# Patient Record
Sex: MALE | Race: ASIAN | NOT HISPANIC OR LATINO | Employment: FULL TIME | URBAN - METROPOLITAN AREA
[De-identification: names, ages, dates, MRNs, and addresses within clinical notes are randomized per-mention and may not be internally consistent; named-entity substitution may affect disease eponyms.]

---

## 2017-09-01 ENCOUNTER — OFFICE VISIT (OUTPATIENT)
Dept: FAMILY MEDICINE | Facility: CLINIC | Age: 22
End: 2017-09-01
Payer: COMMERCIAL

## 2017-09-01 VITALS
HEART RATE: 88 BPM | OXYGEN SATURATION: 96 % | WEIGHT: 315 LBS | DIASTOLIC BLOOD PRESSURE: 100 MMHG | SYSTOLIC BLOOD PRESSURE: 148 MMHG | TEMPERATURE: 98.1 F | BODY MASS INDEX: 49.44 KG/M2

## 2017-09-01 DIAGNOSIS — I10 ESSENTIAL HYPERTENSION WITH GOAL BLOOD PRESSURE LESS THAN 140/90: Primary | ICD-10-CM

## 2017-09-01 LAB
ANION GAP SERPL CALCULATED.3IONS-SCNC: 9 MMOL/L (ref 3–14)
BUN SERPL-MCNC: 9 MG/DL (ref 7–30)
CALCIUM SERPL-MCNC: 9.2 MG/DL (ref 8.5–10.1)
CHLORIDE SERPL-SCNC: 104 MMOL/L (ref 94–109)
CO2 SERPL-SCNC: 24 MMOL/L (ref 20–32)
CREAT SERPL-MCNC: 0.88 MG/DL (ref 0.66–1.25)
CREAT UR-MCNC: 190 MG/DL
GFR SERPL CREATININE-BSD FRML MDRD: >90 ML/MIN/1.7M2
GLUCOSE SERPL-MCNC: 109 MG/DL (ref 70–99)
MICROALBUMIN UR-MCNC: 20 MG/L
MICROALBUMIN/CREAT UR: 10.47 MG/G CR (ref 0–17)
POTASSIUM SERPL-SCNC: 4.1 MMOL/L (ref 3.4–5.3)
SODIUM SERPL-SCNC: 137 MMOL/L (ref 133–144)

## 2017-09-01 PROCEDURE — 36415 COLL VENOUS BLD VENIPUNCTURE: CPT | Performed by: FAMILY MEDICINE

## 2017-09-01 PROCEDURE — 82043 UR ALBUMIN QUANTITATIVE: CPT | Performed by: FAMILY MEDICINE

## 2017-09-01 PROCEDURE — 80048 BASIC METABOLIC PNL TOTAL CA: CPT | Performed by: FAMILY MEDICINE

## 2017-09-01 PROCEDURE — 99214 OFFICE O/P EST MOD 30 MIN: CPT | Performed by: FAMILY MEDICINE

## 2017-09-01 RX ORDER — MIRTAZAPINE 7.5 MG/1
7.5 TABLET, FILM COATED ORAL
COMMUNITY
Start: 2017-10-25 | End: 2024-09-27

## 2017-09-01 RX ORDER — LISINOPRIL 10 MG/1
10 TABLET ORAL DAILY
Qty: 30 TABLET | Refills: 1 | Status: SHIPPED | OUTPATIENT
Start: 2017-09-01 | End: 2024-09-27

## 2017-09-01 NOTE — LETTER
September 5, 2017      Mario Calderon  [address] [place] [address]            Dear Mario Calderon    Your kidney and electrolyte tests were normal. Please follow up in 1 month for blood pressure visit to recheck blood pressure to make sure it is at goal with current medication. If not, we'll make adjustments.      Enclosed is a copy of the results.  It was a pleasure to see you at your last appointment.    If you have any questions or concerns, please call myself or my nurse at [phone].    Sincerely,      Maxx Rosa MD/TAYLOR Chavez MA      Results for orders placed or performed in visit on 09/01/17   Basic metabolic panel   Result Value Ref Range    Sodium 137 133 - 144 mmol/L    Potassium 4.1 3.4 - 5.3 mmol/L    Chloride 104 94 - 109 mmol/L    Carbon Dioxide 24 20 - 32 mmol/L    Anion Gap 9 3 - 14 mmol/L    Glucose 109 (H) 70 - 99 mg/dL    Urea Nitrogen 9 7 - 30 mg/dL    Creatinine 0.88 0.66 - 1.25 mg/dL    GFR Estimate >90 >60 mL/min/1.7m2    GFR Estimate If Black >90 >60 mL/min/1.7m2    Calcium 9.2 8.5 - 10.1 mg/dL   Albumin Random Urine Quantitative with Creat Ratio   Result Value Ref Range    Creatinine Urine 190 mg/dL    Albumin Urine mg/L 20 mg/L    Albumin Urine mg/g Cr 10.47 0 - 17 mg/g Cr

## 2017-09-01 NOTE — NURSING NOTE
"Chief Complaint   Patient presents with     Hypertension     Start 7 months ago       Initial BP (!) 148/100 (BP Location: Right arm, Patient Position: Chair, Cuff Size: Adult Large)  Pulse 88  Temp 98.1  F (36.7  C) (Oral)  Wt (!) 347 lb (157.4 kg)  SpO2 96%  BMI 49.44 kg/m2 Estimated body mass index is 49.44 kg/(m^2) as calculated from the following:    Height as of 10/15/15: 5' 10.25\" (1.784 m).    Weight as of this encounter: 347 lb (157.4 kg).  Medication Reconciliation: complete   Stephani Tavera MA        "

## 2017-09-01 NOTE — PROGRESS NOTES
SUBJECTIVE:   Mario Calderon is a 21 year old male who presents to clinic today for the following health issues:      Hypertension    Hypertension Follow-up      Outpatient blood pressures are not being checked.    Low Salt Diet: not monitoring salt          Problem list and histories reviewed & adjusted, as indicated.  Additional history: as documented    Patient Active Problem List   Diagnosis     ACL tear     Asthma, exercise induced     CARDIOVASCULAR SCREENING; LDL GOAL LESS THAN 160     Diagnostic skin and sensitization tests     Allergic rhinitis due to animal dander     Seasonal allergic rhinitis     Seasonal allergic conjunctivitis     Allergy to mold spores     Acute appendicitis     Essential hypertension with goal blood pressure less than 140/90     Past Surgical History:   Procedure Laterality Date     C HAND/FINGER SURGERY UNLISTED       C TOTAL KNEE ARTHROPLASTY       PE TUBES         Social History   Substance Use Topics     Smoking status: Never Smoker     Smokeless tobacco: Never Used     Alcohol use No     Family History   Problem Relation Age of Onset     Psychotic Disorder Mother      Thyroid Disease Mother      Blood Disease Mother      hep b     DIABETES Maternal Grandmother      CEREBROVASCULAR DISEASE Maternal Grandmother      Glaucoma Maternal Grandmother      Asthma Brother      Asthma Sister      Asthma Brother      Hypertension No family hx of      Macular Degeneration No family hx of      CANCER Maternal Aunt              Reviewed and updated as needed this visit by [facility]  M[facility]       Reviewed and updated as needed this visit by Provider         ROS:  Constitutional, HEENT, cardiovascular, pulmonary, GI, , musculoskeletal, neuro, skin, endocrine and psych systems are negative, except as otherwise noted.      OBJECTIVE:   BP (!) 148/100 (BP Location: Right arm, Patient Position: Chair, Cuff Size: Adult Large)  Pulse 88  Temp 98.1  F (36.7  C) (Oral)  Wt  (!) 347 lb (157.4 kg)  SpO2 96%  BMI 49.44 kg/m2  Body mass index is 49.44 kg/(m^2).  GENERAL: healthy, alert and no distress  NECK: no adenopathy, no asymmetry, masses, or scars and thyroid normal to palpation  RESP: lungs clear to auscultation - no rales, rhonchi or wheezes  CV: regular rate and rhythm, normal S1 S2, no S3 or S4, no murmur, click or rub, no peripheral edema and peripheral pulses strong  ABDOMEN: soft, nontender, no hepatosplenomegaly, no masses and bowel sounds normal  MS: no gross musculoskeletal defects noted, no edema    Diagnostic Test Results:  none     ASSESSMENT/PLAN:     1. Essential hypertension with goal blood pressure less than 140/90  Not controlled. Start lisinopril 10 mg daily. Discussed low salt diet. RTC in 1 month for bp and bmp recheck.  - lisinopril (PRINIVIL/ZESTRIL) 10 MG tablet; Take 1 tablet (10 mg) by mouth daily  Dispense: 30 tablet; Refill: 1  - Basic metabolic panel  - Albumin Random Urine Quantitative with Creat Ratio    Work on weight loss  Regular exercise  See Patient Instructions    Maxx Rosa MD, MD  [facility]

## 2017-09-01 NOTE — MR AVS SNAPSHOT
"              After Visit Summary   2017    Mario Calderon    MRN: [id number]           Patient Information     Date Of Birth          1995        Visit Information        Provider Department      2017 2:40 PM Maxx Rosa MD [facility]        Today's Diagnoses     Essential hypertension with goal blood pressure less than 140/90    -  1       Follow-ups after your visit        Who to contact     If you have questions or need follow up information about today's clinic visit or your schedule please contact [facility] directly at [phone].  Normal or non-critical lab and imaging results will be communicated to you by [employer], letter or phone within 4 business days after the clinic has received the results. If you do not hear from us within 7 days, please contact the clinic through [employer] or phone. If you have a critical or abnormal lab result, we will notify you by phone as soon as possible.  Submit refill requests through [employer] or call your pharmacy and they will forward the refill request to us. Please allow 3 business days for your refill to be completed.          Additional Information About Your Visit        MyChart Information     [employer] lets you send messages to your doctor, view your test results, renew your prescriptions, schedule appointments and more. To sign up, go to [email or web address] . Click on \"Log in\" on the left side of the screen, which will take you to the Welcome page. Then click on \"Sign up Now\" on the right side of the page.     You will be asked to enter the access code listed below, as well as some personal information. Please follow the directions to create your username and password.     Your access code is: [email or web address]  Expires: 2017  3:27 PM     Your access code will  in 90 days. If you need help or a new code, please call your [facility] or [phone].        Care EveryWhere ID     This is your Care " EveryWhere ID. This could be used by other organizations to access your [facility] medical records  [id number]        Your Vitals Were     Pulse Temperature Pulse Oximetry BMI (Body Mass Index)          88 98.1  F (36.7  C) (Oral) 96% 49.44 kg/m2         Blood Pressure from Last 3 Encounters:   09/01/17 (!) 148/100   11/20/15 (!) 160/94   10/15/15 120/75    Weight from Last 3 Encounters:   09/01/17 (!) 347 lb (157.4 kg)   11/20/15 (!) 303 lb (137.4 kg)   10/15/15 289 lb (131.1 kg)              We Performed the Following     Albumin Random Urine Quantitative with Creat Ratio     Basic metabolic panel          Today's Medication Changes          These changes are accurate as of: 9/1/17  3:27 PM.  If you have any questions, ask your nurse or doctor.               Start taking these medicines.        Dose/Directions    lisinopril 10 MG tablet   Commonly known as:  PRINIVIL/ZESTRIL   Used for:  Essential hypertension with goal blood pressure less than 140/90   Started by:  Maxx Rosa MD        Dose:  10 mg   Take 1 tablet (10 mg) by mouth daily   Quantity:  30 tablet   Refills:  1         Stop taking these medicines if you haven't already. Please contact your care team if you have questions.     citalopram 40 MG tablet   Commonly known as:  celeXA   Stopped by:  Maxx Rosa MD                Where to get your medicines      These medications were sent to [facility] - [place] - [address]  [address], [facility] [address]     Phone:  [phone]     lisinopril 10 MG tablet                Primary Care Provider Office Phone # Fax #    Maxx Rosa [phone]       [address]  Health system [address]        Equal Access to Services     [facility] JOSE L : Annamarie gudinoo Soandriy, waaxda luqadaha, qaybta kaalmada adeegyada, john castillo. So [facility] [phone].    ATENCIÓN: Si habla español, tiene a yepez disposición servicios gratuitos  de asistencia lingüística. Shi zazueta [phone].    We comply with applicable federal civil rights laws and [place] laws. We do not discriminate on the basis of race, color, national origin, age, disability sex, sexual orientation or gender identity.            Thank you!     Thank you for choosing [facility]  for your care. Our goal is always to provide you with excellent care. Hearing back from our patients is one way we can continue to improve our services. Please take a few minutes to complete the written survey that you may receive in the mail after your visit with us. Thank you!             Your Updated Medication List - Protect others around you: Learn how to safely use, store and throw away your medicines at www.disposemymeds.org.          This list is accurate as of: 9/1/17  3:27 PM.  Always use your most recent med list.                   Brand Name Dispense Instructions for use Diagnosis    fluticasone 50 MCG/ACT spray    FLONASE    1 Package    Spray 2 sprays into both nostrils daily.    Seasonal allergic rhinitis       lisinopril 10 MG tablet    PRINIVIL/ZESTRIL    30 tablet    Take 1 tablet (10 mg) by mouth daily    Essential hypertension with goal blood pressure less than 140/90       mirtazapine 7.5 MG Tabs tablet   Start taking on:  10/25/2017    REMERON     Take 7.5 mg by mouth

## 2022-05-18 ENCOUNTER — TRANSFERRED RECORDS (OUTPATIENT)
Dept: HEALTH INFORMATION MANAGEMENT | Facility: CLINIC | Age: 27
End: 2022-05-18

## 2023-03-08 ENCOUNTER — TRANSFERRED RECORDS (OUTPATIENT)
Dept: HEALTH INFORMATION MANAGEMENT | Facility: CLINIC | Age: 28
End: 2023-03-08

## 2023-07-17 ENCOUNTER — TRANSFERRED RECORDS (OUTPATIENT)
Dept: HEALTH INFORMATION MANAGEMENT | Facility: CLINIC | Age: 28
End: 2023-07-17

## 2024-09-27 ENCOUNTER — APPOINTMENT (OUTPATIENT)
Dept: MRI IMAGING | Facility: CLINIC | Age: 29
DRG: 552 | End: 2024-09-27
Attending: FAMILY MEDICINE
Payer: COMMERCIAL

## 2024-09-27 ENCOUNTER — HOSPITAL ENCOUNTER (INPATIENT)
Facility: CLINIC | Age: 29
LOS: 4 days | Discharge: HOME | DRG: 552 | End: 2024-10-01
Attending: FAMILY MEDICINE | Admitting: INTERNAL MEDICINE
Payer: COMMERCIAL

## 2024-09-27 ENCOUNTER — APPOINTMENT (OUTPATIENT)
Dept: CT IMAGING | Facility: CLINIC | Age: 29
DRG: 552 | End: 2024-09-27
Attending: FAMILY MEDICINE
Payer: COMMERCIAL

## 2024-09-27 ENCOUNTER — APPOINTMENT (OUTPATIENT)
Dept: CT IMAGING | Facility: CLINIC | Age: 29
DRG: 552 | End: 2024-09-27
Attending: NURSE PRACTITIONER
Payer: COMMERCIAL

## 2024-09-27 DIAGNOSIS — M54.41 CHRONIC MIDLINE LOW BACK PAIN WITH BILATERAL SCIATICA: ICD-10-CM

## 2024-09-27 DIAGNOSIS — R20.0 BILATERAL LEG NUMBNESS: ICD-10-CM

## 2024-09-27 DIAGNOSIS — M54.16 LUMBAR RADICULOPATHY: ICD-10-CM

## 2024-09-27 DIAGNOSIS — R33.9 URINARY RETENTION: ICD-10-CM

## 2024-09-27 DIAGNOSIS — G89.29 CHRONIC MIDLINE LOW BACK PAIN WITH BILATERAL SCIATICA: ICD-10-CM

## 2024-09-27 DIAGNOSIS — M54.42 CHRONIC MIDLINE LOW BACK PAIN WITH BILATERAL SCIATICA: ICD-10-CM

## 2024-09-27 DIAGNOSIS — Z89.511 ACQUIRED ABSENCE OF RIGHT LOWER EXTREMITY BELOW KNEE (H): Primary | ICD-10-CM

## 2024-09-27 DIAGNOSIS — R32 URINARY INCONTINENCE, UNSPECIFIED TYPE: ICD-10-CM

## 2024-09-27 LAB
ABO/RH(D): NORMAL
ALBUMIN SERPL BCG-MCNC: 4.2 G/DL (ref 3.5–5.2)
ALBUMIN UR-MCNC: NEGATIVE MG/DL
ALP SERPL-CCNC: 69 U/L (ref 40–150)
ALT SERPL W P-5'-P-CCNC: 45 U/L (ref 0–70)
ANION GAP SERPL CALCULATED.3IONS-SCNC: 12 MMOL/L (ref 7–15)
ANTIBODY SCREEN: NEGATIVE
APPEARANCE UR: CLEAR
APTT PPP: 27 SECONDS (ref 22–38)
AST SERPL W P-5'-P-CCNC: 31 U/L (ref 0–45)
BACTERIA #/AREA URNS HPF: ABNORMAL /HPF
BASOPHILS # BLD AUTO: 0.1 10E3/UL (ref 0–0.2)
BASOPHILS NFR BLD AUTO: 1 %
BILIRUB SERPL-MCNC: 0.4 MG/DL
BILIRUB UR QL STRIP: NEGATIVE
BUN SERPL-MCNC: 11.3 MG/DL (ref 6–20)
CALCIUM SERPL-MCNC: 9.3 MG/DL (ref 8.8–10.4)
CHLORIDE SERPL-SCNC: 108 MMOL/L (ref 98–107)
COLOR UR AUTO: ABNORMAL
CREAT SERPL-MCNC: 0.89 MG/DL (ref 0.67–1.17)
EGFRCR SERPLBLD CKD-EPI 2021: >90 ML/MIN/1.73M2
EOSINOPHIL # BLD AUTO: 0.2 10E3/UL (ref 0–0.7)
EOSINOPHIL NFR BLD AUTO: 2 %
ERYTHROCYTE [DISTWIDTH] IN BLOOD BY AUTOMATED COUNT: 12.7 % (ref 10–15)
GLUCOSE SERPL-MCNC: 189 MG/DL (ref 70–99)
GLUCOSE UR STRIP-MCNC: NEGATIVE MG/DL
HCO3 SERPL-SCNC: 21 MMOL/L (ref 22–29)
HCT VFR BLD AUTO: 47.2 % (ref 40–53)
HGB BLD-MCNC: 14.8 G/DL (ref 13.3–17.7)
HGB UR QL STRIP: NEGATIVE
IMM GRANULOCYTES # BLD: 0 10E3/UL
IMM GRANULOCYTES NFR BLD: 0 %
INR PPP: 1.02 (ref 0.85–1.15)
KETONES UR STRIP-MCNC: NEGATIVE MG/DL
LEUKOCYTE ESTERASE UR QL STRIP: NEGATIVE
LYMPHOCYTES # BLD AUTO: 2 10E3/UL (ref 0.8–5.3)
LYMPHOCYTES NFR BLD AUTO: 25 %
MCH RBC QN AUTO: 25.2 PG (ref 26.5–33)
MCHC RBC AUTO-ENTMCNC: 31.4 G/DL (ref 31.5–36.5)
MCV RBC AUTO: 80 FL (ref 78–100)
MONOCYTES # BLD AUTO: 0.5 10E3/UL (ref 0–1.3)
MONOCYTES NFR BLD AUTO: 7 %
MUCOUS THREADS #/AREA URNS LPF: PRESENT /LPF
NEUTROPHILS # BLD AUTO: 5.2 10E3/UL (ref 1.6–8.3)
NEUTROPHILS NFR BLD AUTO: 66 %
NITRATE UR QL: NEGATIVE
NRBC # BLD AUTO: 0 10E3/UL
NRBC BLD AUTO-RTO: 0 /100
PH UR STRIP: 5 [PH] (ref 5–7)
PLATELET # BLD AUTO: 349 10E3/UL (ref 150–450)
POTASSIUM SERPL-SCNC: 3.8 MMOL/L (ref 3.4–5.3)
PROT SERPL-MCNC: 8.2 G/DL (ref 6.4–8.3)
RBC # BLD AUTO: 5.88 10E6/UL (ref 4.4–5.9)
RBC URINE: 1 /HPF
SODIUM SERPL-SCNC: 141 MMOL/L (ref 135–145)
SP GR UR STRIP: 1.02 (ref 1–1.03)
SPECIMEN EXPIRATION DATE: NORMAL
SQUAMOUS EPITHELIAL: 1 /HPF
UROBILINOGEN UR STRIP-MCNC: NORMAL MG/DL
WBC # BLD AUTO: 7.9 10E3/UL (ref 4–11)
WBC URINE: 4 /HPF

## 2024-09-27 PROCEDURE — 85730 THROMBOPLASTIN TIME PARTIAL: CPT | Performed by: FAMILY MEDICINE

## 2024-09-27 PROCEDURE — 99207 PR APP CREDIT; MD BILLING SHARED VISIT: CPT

## 2024-09-27 PROCEDURE — 72146 MRI CHEST SPINE W/O DYE: CPT | Mod: 26 | Performed by: RADIOLOGY

## 2024-09-27 PROCEDURE — 250N000012 HC RX MED GY IP 250 OP 636 PS 637

## 2024-09-27 PROCEDURE — 84155 ASSAY OF PROTEIN SERUM: CPT | Performed by: FAMILY MEDICINE

## 2024-09-27 PROCEDURE — 250N000011 HC RX IP 250 OP 636

## 2024-09-27 PROCEDURE — 72148 MRI LUMBAR SPINE W/O DYE: CPT

## 2024-09-27 PROCEDURE — 96374 THER/PROPH/DIAG INJ IV PUSH: CPT | Performed by: FAMILY MEDICINE

## 2024-09-27 PROCEDURE — 86901 BLOOD TYPING SEROLOGIC RH(D): CPT | Performed by: FAMILY MEDICINE

## 2024-09-27 PROCEDURE — 99222 1ST HOSP IP/OBS MODERATE 55: CPT | Mod: FS | Performed by: INTERNAL MEDICINE

## 2024-09-27 PROCEDURE — 250N000013 HC RX MED GY IP 250 OP 250 PS 637

## 2024-09-27 PROCEDURE — 250N000011 HC RX IP 250 OP 636: Performed by: FAMILY MEDICINE

## 2024-09-27 PROCEDURE — 72148 MRI LUMBAR SPINE W/O DYE: CPT | Mod: 26 | Performed by: RADIOLOGY

## 2024-09-27 PROCEDURE — 81001 URINALYSIS AUTO W/SCOPE: CPT | Performed by: FAMILY MEDICINE

## 2024-09-27 PROCEDURE — 86900 BLOOD TYPING SEROLOGIC ABO: CPT | Performed by: FAMILY MEDICINE

## 2024-09-27 PROCEDURE — 120N000002 HC R&B MED SURG/OB UMMC

## 2024-09-27 PROCEDURE — 72128 CT CHEST SPINE W/O DYE: CPT | Mod: 26 | Performed by: RADIOLOGY

## 2024-09-27 PROCEDURE — 99222 1ST HOSP IP/OBS MODERATE 55: CPT | Performed by: NURSE PRACTITIONER

## 2024-09-27 PROCEDURE — 96376 TX/PRO/DX INJ SAME DRUG ADON: CPT | Performed by: FAMILY MEDICINE

## 2024-09-27 PROCEDURE — 99285 EMERGENCY DEPT VISIT HI MDM: CPT | Mod: 25 | Performed by: FAMILY MEDICINE

## 2024-09-27 PROCEDURE — 82947 ASSAY GLUCOSE BLOOD QUANT: CPT | Performed by: FAMILY MEDICINE

## 2024-09-27 PROCEDURE — 36415 COLL VENOUS BLD VENIPUNCTURE: CPT | Performed by: FAMILY MEDICINE

## 2024-09-27 PROCEDURE — 99285 EMERGENCY DEPT VISIT HI MDM: CPT | Performed by: FAMILY MEDICINE

## 2024-09-27 PROCEDURE — 72131 CT LUMBAR SPINE W/O DYE: CPT

## 2024-09-27 PROCEDURE — 250N000012 HC RX MED GY IP 250 OP 636 PS 637: Performed by: FAMILY MEDICINE

## 2024-09-27 PROCEDURE — 72128 CT CHEST SPINE W/O DYE: CPT

## 2024-09-27 PROCEDURE — 250N000013 HC RX MED GY IP 250 OP 250 PS 637: Performed by: FAMILY MEDICINE

## 2024-09-27 PROCEDURE — 72131 CT LUMBAR SPINE W/O DYE: CPT | Mod: 26 | Performed by: RADIOLOGY

## 2024-09-27 PROCEDURE — 85610 PROTHROMBIN TIME: CPT | Performed by: FAMILY MEDICINE

## 2024-09-27 PROCEDURE — 72146 MRI CHEST SPINE W/O DYE: CPT

## 2024-09-27 PROCEDURE — 85025 COMPLETE CBC W/AUTO DIFF WBC: CPT | Performed by: FAMILY MEDICINE

## 2024-09-27 RX ORDER — METHYLPREDNISOLONE 4 MG/1
4 TABLET ORAL ONCE
Status: COMPLETED | OUTPATIENT
Start: 2024-09-27 | End: 2024-09-27

## 2024-09-27 RX ORDER — HYDROCODONE BITARTRATE AND ACETAMINOPHEN 5; 325 MG/1; MG/1
1 TABLET ORAL ONCE
Status: COMPLETED | OUTPATIENT
Start: 2024-09-27 | End: 2024-09-27

## 2024-09-27 RX ORDER — GABAPENTIN 100 MG/1
200 CAPSULE ORAL 3 TIMES DAILY
Status: DISCONTINUED | OUTPATIENT
Start: 2024-09-27 | End: 2024-09-30

## 2024-09-27 RX ORDER — AMOXICILLIN 250 MG
2 CAPSULE ORAL 2 TIMES DAILY PRN
Status: DISCONTINUED | OUTPATIENT
Start: 2024-09-27 | End: 2024-10-01 | Stop reason: HOSPADM

## 2024-09-27 RX ORDER — ONDANSETRON 2 MG/ML
4 INJECTION INTRAMUSCULAR; INTRAVENOUS ONCE
Status: COMPLETED | OUTPATIENT
Start: 2024-09-27 | End: 2024-09-27

## 2024-09-27 RX ORDER — HYDROMORPHONE HYDROCHLORIDE 1 MG/ML
0.5 INJECTION, SOLUTION INTRAMUSCULAR; INTRAVENOUS; SUBCUTANEOUS ONCE
Status: COMPLETED | OUTPATIENT
Start: 2024-09-27 | End: 2024-09-27

## 2024-09-27 RX ORDER — METHYLPREDNISOLONE 4 MG/1
4 TABLET ORAL
Status: COMPLETED | OUTPATIENT
Start: 2024-09-28 | End: 2024-09-30

## 2024-09-27 RX ORDER — OXYCODONE HYDROCHLORIDE 10 MG/1
10 TABLET ORAL EVERY 4 HOURS PRN
Status: DISCONTINUED | OUTPATIENT
Start: 2024-09-27 | End: 2024-10-01 | Stop reason: HOSPADM

## 2024-09-27 RX ORDER — METHYLPREDNISOLONE 8 MG/1
8 TABLET ORAL ONCE
Status: COMPLETED | OUTPATIENT
Start: 2024-09-27 | End: 2024-09-27

## 2024-09-27 RX ORDER — METHYLPREDNISOLONE 8 MG/1
8 TABLET ORAL AT BEDTIME
Status: COMPLETED | OUTPATIENT
Start: 2024-09-27 | End: 2024-09-28

## 2024-09-27 RX ORDER — METHYLPREDNISOLONE 4 MG/1
4 TABLET ORAL
Status: DISCONTINUED | OUTPATIENT
Start: 2024-09-28 | End: 2024-10-01 | Stop reason: HOSPADM

## 2024-09-27 RX ORDER — ONDANSETRON 4 MG/1
4 TABLET, ORALLY DISINTEGRATING ORAL EVERY 6 HOURS PRN
Status: DISCONTINUED | OUTPATIENT
Start: 2024-09-27 | End: 2024-10-01 | Stop reason: HOSPADM

## 2024-09-27 RX ORDER — CALCIUM CARBONATE 500 MG/1
1000 TABLET, CHEWABLE ORAL 4 TIMES DAILY PRN
Status: DISCONTINUED | OUTPATIENT
Start: 2024-09-27 | End: 2024-10-01 | Stop reason: HOSPADM

## 2024-09-27 RX ORDER — AMOXICILLIN 250 MG
1 CAPSULE ORAL 2 TIMES DAILY PRN
Status: DISCONTINUED | OUTPATIENT
Start: 2024-09-27 | End: 2024-10-01 | Stop reason: HOSPADM

## 2024-09-27 RX ORDER — LIDOCAINE 40 MG/G
CREAM TOPICAL
Status: DISCONTINUED | OUTPATIENT
Start: 2024-09-27 | End: 2024-10-01 | Stop reason: HOSPADM

## 2024-09-27 RX ORDER — ACETAMINOPHEN 325 MG/1
975 TABLET ORAL 3 TIMES DAILY
Status: DISCONTINUED | OUTPATIENT
Start: 2024-09-28 | End: 2024-10-01 | Stop reason: HOSPADM

## 2024-09-27 RX ORDER — ONDANSETRON 2 MG/ML
4 INJECTION INTRAMUSCULAR; INTRAVENOUS EVERY 6 HOURS PRN
Status: DISCONTINUED | OUTPATIENT
Start: 2024-09-27 | End: 2024-10-01 | Stop reason: HOSPADM

## 2024-09-27 RX ORDER — METHYLPREDNISOLONE 4 MG/1
4 TABLET ORAL AT BEDTIME
Status: DISCONTINUED | OUTPATIENT
Start: 2024-09-29 | End: 2024-10-01 | Stop reason: HOSPADM

## 2024-09-27 RX ORDER — HYDRALAZINE HYDROCHLORIDE 20 MG/ML
10 INJECTION INTRAMUSCULAR; INTRAVENOUS EVERY 6 HOURS PRN
Status: DISCONTINUED | OUTPATIENT
Start: 2024-09-27 | End: 2024-10-01 | Stop reason: HOSPADM

## 2024-09-27 RX ORDER — METHYLPREDNISOLONE 4 MG/1
4 TABLET ORAL
Status: COMPLETED | OUTPATIENT
Start: 2024-09-28 | End: 2024-09-29

## 2024-09-27 RX ORDER — POLYETHYLENE GLYCOL 3350 17 G/17G
17 POWDER, FOR SOLUTION ORAL DAILY
Status: DISCONTINUED | OUTPATIENT
Start: 2024-09-28 | End: 2024-10-01 | Stop reason: HOSPADM

## 2024-09-27 RX ORDER — HYDROMORPHONE HCL IN WATER/PF 6 MG/30 ML
0.4 PATIENT CONTROLLED ANALGESIA SYRINGE INTRAVENOUS
Status: DISCONTINUED | OUTPATIENT
Start: 2024-09-27 | End: 2024-10-01 | Stop reason: HOSPADM

## 2024-09-27 RX ORDER — LIDOCAINE 40 MG/G
CREAM TOPICAL
Status: DISCONTINUED | OUTPATIENT
Start: 2024-09-27 | End: 2024-09-27

## 2024-09-27 RX ORDER — HYDROMORPHONE HYDROCHLORIDE 2 MG/1
2 TABLET ORAL ONCE
Status: COMPLETED | OUTPATIENT
Start: 2024-09-27 | End: 2024-09-27

## 2024-09-27 RX ADMIN — HYDROMORPHONE HYDROCHLORIDE 0.5 MG: 1 INJECTION, SOLUTION INTRAMUSCULAR; INTRAVENOUS; SUBCUTANEOUS at 18:57

## 2024-09-27 RX ADMIN — HYDROMORPHONE HYDROCHLORIDE 2 MG: 2 TABLET ORAL at 17:37

## 2024-09-27 RX ADMIN — METHYLPREDNISOLONE 4 MG: 4 TABLET ORAL at 20:51

## 2024-09-27 RX ADMIN — METHYLPREDNISOLONE 4 MG: 4 TABLET ORAL at 20:52

## 2024-09-27 RX ADMIN — METHYLPREDNISOLONE 8 MG: 8 TABLET ORAL at 20:52

## 2024-09-27 RX ADMIN — HYDROMORPHONE HYDROCHLORIDE 0.5 MG: 1 INJECTION, SOLUTION INTRAMUSCULAR; INTRAVENOUS; SUBCUTANEOUS at 15:20

## 2024-09-27 RX ADMIN — METHYLPREDNISOLONE 8 MG: 8 TABLET ORAL at 22:45

## 2024-09-27 RX ADMIN — GABAPENTIN 200 MG: 100 CAPSULE ORAL at 22:45

## 2024-09-27 RX ADMIN — HYDROCODONE BITARTRATE AND ACETAMINOPHEN 1 TABLET: 5; 325 TABLET ORAL at 12:07

## 2024-09-27 RX ADMIN — ONDANSETRON 4 MG: 2 INJECTION INTRAMUSCULAR; INTRAVENOUS at 22:46

## 2024-09-27 ASSESSMENT — ACTIVITIES OF DAILY LIVING (ADL)
ADLS_ACUITY_SCORE: 35
ADLS_ACUITY_SCORE: 33
ADLS_ACUITY_SCORE: 35

## 2024-09-27 ASSESSMENT — COLUMBIA-SUICIDE SEVERITY RATING SCALE - C-SSRS
6. HAVE YOU EVER DONE ANYTHING, STARTED TO DO ANYTHING, OR PREPARED TO DO ANYTHING TO END YOUR LIFE?: NO
2. HAVE YOU ACTUALLY HAD ANY THOUGHTS OF KILLING YOURSELF IN THE PAST MONTH?: NO
1. IN THE PAST MONTH, HAVE YOU WISHED YOU WERE DEAD OR WISHED YOU COULD GO TO SLEEP AND NOT WAKE UP?: NO

## 2024-09-27 NOTE — ED PROVIDER NOTES
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  Patient typically has had sciatica pain down his right leg and no problems with this.  No previous surgeries on his back noted.  Patient awoke this morning though with low back pain with bilateral leg numbness along with this urinary continence and saddle anesthesia that he describes.  Still persisting patient states he has to push hard to urinate denies fever chills dysuric symptoms as noted no back surgeries otherwise no headache no other neurochanges now presents for evaluation.    Past Medical History  Past Medical History:   Diagnosis Date    ADD (attention deficit disorder)     Allergic rhinitis due to animal dander     Allergy to mold spores     8/28/12 skin tests pos. for:  dog(+)/DM/M/T/G/W    Asthma, exercise induced     excersise induced    Diagnostic skin and sensitization tests 8/28/12 skin tests pos. for:  dog(+)/DM/M/T/G/W    Essential hypertension with goal blood pressure less than 140/90 9/1/2017    House dust mite allergy     Seasonal allergic conjunctivitis     Seasonal allergic rhinitis      Past Surgical History:   Procedure Laterality Date    PE TUBES      ZZC HAND/FINGER SURGERY UNLISTED      ZZC TOTAL KNEE ARTHROPLASTY       No current outpatient medications on file.    No Known Allergies  Family History  Family History   Problem Relation Age of Onset    Psychotic Disorder Mother     Thyroid Disease Mother     Blood Disease Mother         hep b    Diabetes Maternal Grandmother     Cerebrovascular Disease Maternal Grandmother     Glaucoma Maternal Grandmother     Asthma Brother     Asthma Sister      Asthma Brother     Hypertension No family hx of     Macular Degeneration No family hx of     Cancer Maternal Aunt      Social History   Social History     Tobacco Use    Smoking status: Never    Smokeless tobacco: Never   Substance Use Topics    Alcohol use: No    Drug use: No      A medically appropriate review of systems was performed with pertinent positives and negatives noted in the HPI, and all other systems negative.    Physical Exam   BP: (!) 144/95  Pulse: 97  Temp: 98.2  F (36.8  C)  Resp: 16  Height: 182.9 cm (6')  Weight: 149.7 kg (330 lb)  SpO2: 97 %  Physical Exam  Vitals and nursing note reviewed.   Constitutional:       General: He is in acute distress.      Appearance: He is well-developed. He is not toxic-appearing or diaphoretic.      Comments: Patient here with brother is uncomfortable but alert and orient x 3.  Describing acute new symptoms today.   HENT:      Head: Normocephalic and atraumatic.      Nose: Nose normal. No congestion.      Mouth/Throat:      Mouth: Mucous membranes are moist.      Pharynx: Oropharynx is clear.   Eyes:      General: No scleral icterus.     Extraocular Movements: Extraocular movements intact.      Conjunctiva/sclera: Conjunctivae normal.      Pupils: Pupils are equal, round, and reactive to light.   Cardiovascular:      Rate and Rhythm: Normal rate and regular rhythm.   Pulmonary:      Effort: Pulmonary effort is normal. No respiratory distress.      Breath sounds: No stridor.   Abdominal:      General: Abdomen is flat. There is distension.      Palpations: Abdomen is soft.      Tenderness: There is no abdominal tenderness. There is no guarding.   Genitourinary:     Comments: Patient reports perineal numbness  Musculoskeletal:         General: Tenderness present. No deformity. Normal range of motion.      Cervical back: Normal range of motion and neck supple. No rigidity or tenderness.      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   Skin:     General: Skin is warm and dry.      Capillary Refill: Capillary refill takes less than 2 seconds.      Findings: No rash.   Neurological:      General: No focal deficit present.      Mental Status: He is alert and oriented to person, place, and time.      Cranial Nerves: No cranial nerve deficit.      Sensory: Sensory deficit present.      Coordination: Coordination normal.      Comments: Patient describing bilateral leg numbness and also some saddle anesthesia.  No motor weakness noted low back pain noted upon palpation   Psychiatric:      Comments: Patient comfort because of pain otherwise appropriate cooperative here.           ED Course, Procedures, & Data      X-ray in epic.  Patient is seen for some alcohol use in the past before.  Patient history of PTSD also.    In the ER an IV was established labs drawn reviewed.  I counseled neurosurgery right away who did see the patient in the ER.  I had ordered an MRI of the lumbar spine along with thoracic spine I discussed with the MRI the urgency of this they stated their way behind back wanted to expedite this as soon as possible.  They were aware and will try to do what they could.  Patient initially given Norco 1 tablet for pain control here in the ER.  Labs drawn reviewed his sodium 134 potassium 4.1.  Bicarb 21 gap is 10 glucose 173 sodium 141 potassium 3.8 bicarb is 21.  White count 8.2 hemoglobin 13.9.  Postvoid residual was 2-300 cc per nursing refer to note.  Urinalysis no sign of infection.    Patient has noted seen by neurosurgery down here recommended a CT scan of the lumbar spine also.      Patient here in the ER MRI also ordered of the thoracic and lumbar spine.  Neurosurgery reviewed these but felt there need to be more axial views of the thoracic spine which were not done.  They examined the patient down here in the ER.  [place] this point there was not an emergent finding with their  examination and rectal exam etc.  They felt the patient should be admitted to medicine with Medrol Dosepak keep n.p.o. after midnight they may consider still surgery after reviewing the MRI findings with large disc bulge etc.    Here in the ER though patient was given IV Dilaudid for pain control.  I talked to medicine will plan admit to their service we started a Medrol Dosepak also per neurosurgery's recommendations.  Pain control recommended etc.  As noted patient to be admitted to medicine service.      Procedures                 Results for orders placed or performed during the hospital encounter of 09/27/24   MR Lumbar Spine w/o Contrast     Status: None    Narrative    MR LUMBAR SPINE W/O CONTRAST 9/27/2024 4:47 PM    Provided History: low back pain with bilateral leg numbness with  saddle numbness and urinary incontinence.    Comparison: CT same day    Technique: Sagittal T1-weighted, sagittal STIR, sagittal  diffusion-weighted (with ADC map), axial T1-weighted, and 3D  volumetric axial, coronal, and sagittal reconstructed T2-weighted  images of the lumbar spine were obtained without intravenous contrast.      Findings: There are 5 lumbar-type vertebrae with partial sacralization  of L5.  The tip of the conus medullaris is at L1.  Minimal stepwise  grade 1 retrolisthesis at L3-4 and L4-5, unchanged. Mild to moderate  disc height narrowing from L3-S1.  Degenerative edema in the opposing  endplates at L4-5.    On a level by level basis:    T12-L1: No spinal canal or neuroforaminal stenosis.    L1-2: No spinal canal or neuroforaminal stenosis.    L2-3: Small posterior disc osteophyte complex results in mild spinal  canal stenosis. Mild neural foraminal narrowing bilaterally.    L3-4: Large centrally extruded posterior disc osteophyte complex  resulting in severe spinal canal stenosis. Mild left neural narrowing.  No right neural foraminal stenosis. Facet hypertrophy bilaterally.    L4-5: Large centrally  extruded posterior disc osteophyte complex  resulting in moderate to severe spinal canal stenosis. Moderate right  and mild left neural foraminal narrowing. Facet hypertrophy  bilaterally.    L5-S1: Moderate posterior disc osteophyte complex contributing to  moderate spinal canal stenosis. Asymmetric left greater than right  facet hypertrophy with heterotopic bone formation resulting in severe  left neural foraminal stenosis. Mild neuroforaminal narrowing on the  right.    Paraspinous tissues are within normal limits.      Impression    Impression:   1. Redemonstration of the large extruded posterior disc osteophyte  complexes at L3-4 and L4-5 with resultant severe spinal canal  stenosis. Also moderate spinal canal stenoses at L5-S1 as a result of  a disc osteophyte complex.  2. Moderate right neural foraminal stenosis at L4-5 and severe left  neural foraminal stenosis at L5-S1.      I have personally reviewed the examination and initial interpretation  and I agree with the findings.    LANIE LEDBETTER MD         SYSTEM ID:  [id number]   Thoracic spine MRI w/o contrast     Status: None    Narrative    MR THORACIC SPINE W/O CONTRAST 9/27/2024 5:17 PM    Provided History: sudden bilateral leg numbness and saddle anesthesia  with urinary incontinence and retention    Comparison: Same day CT    Technique: Sagittal T1-weighted, sagittal T2-weighted, sagittal STIR,  and axial T2-weighted images of the thoracic spine were obtained  without the administration of intravenous contrast. The entire T2  axial sequence was not completed as patient was in too much pain to  finish the exam.    Findings:  T2 axial sequences only performed to the level of T6 due to patient  pain. The thoracic vertebral column is in normal alignment. Mild loss  of disc height at T4-5 and T11-12. Small posterior disc bulges and  protrusions from T4-5 through T8-9 along with endplate osteophytosis  with multilevel mild spinal canal narrowing. Moderate  posterior disc  osteophyte complex at T11-12 results in mild to moderate spinal canal  stenosis and mild left neuroforaminal narrowing. Posterior disc  osteophyte complex at T12-L1 results in mild spinal canal stenosis and  mild right neural foraminal narrowing. No abnormal signal in the  thoracic spinal cord, to the extent visualized.        Impression    Impression:   Multilevel posterior disc osteophyte complexes, most pronounced at  T11-12 with mild to moderate spinal canal stenosis and at T12-L1 with  mild spinal canal stenosis. No high-grade neural foraminal stenosis.    I have personally reviewed the examination and initial interpretation  and I agree with the findings.    LANIE LEDBETTER MD         SYSTEM ID:  [id number]   CT Lumbar Spine w/o Contrast     Status: None    Narrative    Thoracic and Lumbar spine CT without contrast    History: acute back pain.    Comparison: None    Technique: Axial, coronal, and sagittal multiplanar reconstructions  obtained from acquisition of thoracic and lumbar spine CT scan without  contrast.    Findings:  Thoracic spine: There is no acute fracture or subluxation. There is no  prevertebral edema. There is no high-grade spinal canal stenosis at  any level. Disc osteophyte complexes resulting in mild spinal canal  narrowing at the T7-8 and T8-9 levels. Disc osteophyte complex at the  T11-12 space results in mild-to-moderate left neural foraminal  narrowing and probable mild spinal canal narrowing. Small disc  osteophyte complex at the T12-L1 level results in mild right neural  foraminal and mild spinal canal narrowing.No soft tissue abnormality  in the visualized paraspinous tissues anteriorly.    Lumbar Spine: There is no acute fracture or subluxation. There are 4  lumbar type vertebra,, with partial sacralization of the L5 vertebral  segment.   Multilevel disc space narrowing is greatest at the L3-4, L4-5 and  L5-S1 levels. There is ankylosis of the posterior elements of L5  and  S1 on the left.    On a level by level basis, the findings are as follows:    L1-2:  There is no focal abnormality.  L2-3:  Disc osteophyte complex with mild spinal canal stenosis. Mild  right neural foraminal narrowing. No significant left neural foraminal  narrowing.  L3-4:  Large disc osteophyte complex resulting in severe spinal canal  stenosis. Mild to moderate right and mild left left neural foraminal  narrowing. Mild facet arthropathy.  L4-5:  Large disc osteophyte complex resulting in severe spinal canal  stenosis. Moderate right and mild left neural foraminal narrowing.  L5-S1:  Large disc osteophyte complex resulting in moderate severe  spinal canal stenosis. Mild right neural foraminal narrowing. Severe  left neural foraminal narrowing secondary to heterotopic bone  formation and severe facet arthrosis. Partially sacralized left L5  segment.    The visualized paraspinous tissues anteriorly are unremarkable.      Impression    Impression:   1. Large disc osteophyte complexes at the L3-4 and L4-5 levels result  in severe spinal canal stenosis. Additionally, there is moderate to  severe narrowing at the L5-S1 level secondary to a disc osteophyte  complex.  2. Multilevel neural foraminal narrowing is greatest at the left L5-S1  neural foramina where there is severe narrowing secondary to severe  facet arthropathy, heterotopic bone formation and a partially  sacralized left L5 segment.  3. Mild thoracic spondylosis, as detailed above, without high-grade  spinal canal or neural foraminal narrowing.    I have personally reviewed the examination and initial interpretation  and I agree with the findings.    REGI SEWELL MD         SYSTEM ID:  [id number]   CT Thoracic Spine w/o Contrast     Status: None    Narrative    Thoracic and Lumbar spine CT without contrast    History: acute back pain.    Comparison: None    Technique: Axial, coronal, and sagittal multiplanar reconstructions  obtained from  acquisition of thoracic and lumbar spine CT scan without  contrast.    Findings:  Thoracic spine: There is no acute fracture or subluxation. There is no  prevertebral edema. There is no high-grade spinal canal stenosis at  any level. Disc osteophyte complexes resulting in mild spinal canal  narrowing at the T7-8 and T8-9 levels. Disc osteophyte complex at the  T11-12 space results in mild-to-moderate left neural foraminal  narrowing and probable mild spinal canal narrowing. Small disc  osteophyte complex at the T12-L1 level results in mild right neural  foraminal and mild spinal canal narrowing.No soft tissue abnormality  in the visualized paraspinous tissues anteriorly.    Lumbar Spine: There is no acute fracture or subluxation. There are 4  lumbar type vertebra,, with partial sacralization of the L5 vertebral  segment.   Multilevel disc space narrowing is greatest at the L3-4, L4-5 and  L5-S1 levels. There is ankylosis of the posterior elements of L5 and  S1 on the left.    On a level by level basis, the findings are as follows:    L1-2:  There is no focal abnormality.  L2-3:  Disc osteophyte complex with mild spinal canal stenosis. Mild  right neural foraminal narrowing. No significant left neural foraminal  narrowing.  L3-4:  Large disc osteophyte complex resulting in severe spinal canal  stenosis. Mild to moderate right and mild left left neural foraminal  narrowing. Mild facet arthropathy.  L4-5:  Large disc osteophyte complex resulting in severe spinal canal  stenosis. Moderate right and mild left neural foraminal narrowing.  L5-S1:  Large disc osteophyte complex resulting in moderate severe  spinal canal stenosis. Mild right neural foraminal narrowing. Severe  left neural foraminal narrowing secondary to heterotopic bone  formation and severe facet arthrosis. Partially sacralized left L5  segment.    The visualized paraspinous tissues anteriorly are unremarkable.      Impression    Impression:   1. Large  disc osteophyte complexes at the L3-4 and L4-5 levels result  in severe spinal canal stenosis. Additionally, there is moderate to  severe narrowing at the L5-S1 level secondary to a disc osteophyte  complex.  2. Multilevel neural foraminal narrowing is greatest at the left L5-S1  neural foramina where there is severe narrowing secondary to severe  facet arthropathy, heterotopic bone formation and a partially  sacralized left L5 segment.  3. Mild thoracic spondylosis, as detailed above, without high-grade  spinal canal or neural foraminal narrowing.    I have personally reviewed the examination and initial interpretation  and I agree with the findings.    REGI SEWELL MD         SYSTEM ID:  [id number]   INR     Status: Normal   Result Value Ref Range    INR 1.02 0.85 - 1.15   Partial thromboplastin time     Status: Normal   Result Value Ref Range    aPTT 27 22 - 38 Seconds   Comprehensive metabolic panel     Status: Abnormal   Result Value Ref Range    Sodium 141 135 - 145 mmol/L    Potassium 3.8 3.4 - 5.3 mmol/L    Carbon Dioxide (CO2) 21 (L) 22 - 29 mmol/L    Anion Gap 12 7 - 15 mmol/L    Urea Nitrogen 11.3 6.0 - 20.0 mg/dL    Creatinine 0.89 0.67 - 1.17 mg/dL    GFR Estimate >90 >60 mL/min/1.73m2    Calcium 9.3 8.8 - 10.4 mg/dL    Chloride 108 (H) 98 - 107 mmol/L    Glucose 189 (H) 70 - 99 mg/dL    Alkaline Phosphatase 69 40 - 150 U/L    AST 31 0 - 45 U/L    ALT 45 0 - 70 U/L    Protein Total 8.2 6.4 - 8.3 g/dL    Albumin 4.2 3.5 - 5.2 g/dL    Bilirubin Total 0.4 <=1.2 mg/dL   UA with Microscopic reflex to Culture     Status: Abnormal    Specimen: Urine, Clean Catch   Result Value Ref Range    Color Urine Light Yellow Colorless, Straw, Light Yellow, Yellow    Appearance Urine Clear Clear    Glucose Urine Negative Negative mg/dL    Bilirubin Urine Negative Negative    Ketones Urine Negative Negative mg/dL    Specific Gravity Urine 1.017 1.003 - 1.035    Blood Urine Negative Negative    pH Urine 5.0 5.0 - 7.0     Protein Albumin Urine Negative Negative mg/dL    Urobilinogen Urine Normal Normal, 2.0 mg/dL    Nitrite Urine Negative Negative    Leukocyte Esterase Urine Negative Negative    Bacteria Urine Few (A) None Seen /HPF    Mucus Urine Present (A) None Seen /LPF    RBC Urine 1 <=2 /HPF    WBC Urine 4 <=5 /HPF    Squamous Epithelials Urine 1 <=1 /HPF    Narrative    Urine Culture not indicated   CBC with platelets and differential     Status: Abnormal   Result Value Ref Range    WBC Count 7.9 4.0 - 11.0 10e3/uL    RBC Count 5.88 4.40 - 5.90 10e6/uL    Hemoglobin 14.8 13.3 - 17.7 g/dL    Hematocrit 47.2 40.0 - 53.0 %    MCV 80 78 - 100 fL    MCH 25.2 (L) 26.5 - 33.0 pg    MCHC 31.4 (L) 31.5 - 36.5 g/dL    RDW 12.7 10.0 - 15.0 %    Platelet Count 349 150 - 450 10e3/uL    % Neutrophils 66 %    % Lymphocytes 25 %    % Monocytes 7 %    % Eosinophils 2 %    % Basophils 1 %    % Immature Granulocytes 0 %    NRBCs per 100 WBC 0 <1 /100    Absolute Neutrophils 5.2 1.6 - 8.3 10e3/uL    Absolute Lymphocytes 2.0 0.8 - 5.3 10e3/uL    Absolute Monocytes 0.5 0.0 - 1.3 10e3/uL    Absolute Eosinophils 0.2 0.0 - 0.7 10e3/uL    Absolute Basophils 0.1 0.0 - 0.2 10e3/uL    Absolute Immature Granulocytes 0.0 <=0.4 10e3/uL    Absolute NRBCs 0.0 10e3/uL   Basic metabolic panel     Status: Abnormal   Result Value Ref Range    Sodium 134 (L) 135 - 145 mmol/L    Potassium 4.1 3.4 - 5.3 mmol/L    Chloride 103 98 - 107 mmol/L    Carbon Dioxide (CO2) 21 (L) 22 - 29 mmol/L    Anion Gap 10 7 - 15 mmol/L    Urea Nitrogen 11.7 6.0 - 20.0 mg/dL    Creatinine 0.73 0.67 - 1.17 mg/dL    GFR Estimate >90 >60 mL/min/1.73m2    Calcium 9.0 8.8 - 10.4 mg/dL    Glucose 173 (H) 70 - 99 mg/dL   CBC with platelets     Status: Abnormal   Result Value Ref Range    WBC Count 8.2 4.0 - 11.0 10e3/uL    RBC Count 5.52 4.40 - 5.90 10e6/uL    Hemoglobin 13.9 13.3 - 17.7 g/dL    Hematocrit 42.9 40.0 - 53.0 %    MCV 78 78 - 100 fL    MCH 25.2 (L) 26.5 - 33.0 pg    MCHC 32.4 31.5  - 36.5 g/dL    RDW 12.5 10.0 - 15.0 %    Platelet Count 334 150 - 450 10e3/uL   Adult Type and Screen     Status: None   Result Value Ref Range    ABO/RH(D) O POS     Antibody Screen Negative Negative    SPECIMEN EXPIRATION DATE [id number]    CBC with platelets differential     Status: Abnormal    Narrative    The following orders were created for panel order CBC with platelets differential.  Procedure                               Abnormality         Status                     ---------                               -----------         ------                     CBC with platelets and d...[[id number]]  Abnormal            Final result                 Please view results for these tests on the individual orders.   ABO/Rh type and screen     Status: None    Narrative    The following orders were created for panel order ABO/Rh type and screen.  Procedure                               Abnormality         Status                     ---------                               -----------         ------                     Adult Type and Screen[[id number]]                            Final result                 Please view results for these tests on the individual orders.     Medications   methylPREDNISolone (MEDROL) tablet 8 mg (8 mg Oral $Given 9/27/24 2052)     And   methylPREDNISolone (MEDROL) tablet 4 mg (4 mg Oral $Given 9/27/24 2052)     And   methylPREDNISolone (MEDROL) tablet 4 mg (4 mg Oral $Given 9/27/24 2051)     And   methylPREDNISolone (MEDROL) tablet 8 mg (8 mg Oral $Given 9/27/24 2245)     And   methylPREDNISolone (MEDROL) tablet 4 mg (4 mg Oral $Given 9/28/24 0744)     And   methylPREDNISolone (MEDROL) tablet 4 mg (has no administration in time range)     And   methylPREDNISolone (MEDROL) tablet 4 mg (has no administration in time range)     And   methylPREDNISolone (MEDROL) tablet 4 mg (has no administration in time range)   lidocaine 1 % 0.1-1 mL (has no administration in time range)   lidocaine (LMX4)  cream (has no administration in time range)   sodium chloride (PF) 0.9% PF flush 3 mL (3 mLs Intracatheter $Given 9/28/24 0527)   sodium chloride (PF) 0.9% PF flush 3 mL (has no administration in time range)   senna-docusate (SENOKOT-S/PERICOLACE) 8.6-50 MG per tablet 1 tablet (has no administration in time range)     Or   senna-docusate (SENOKOT-S/PERICOLACE) 8.6-50 MG per tablet 2 tablet (has no administration in time range)   calcium carbonate (TUMS) chewable tablet 1,000 mg (has no administration in time range)   acetaminophen (TYLENOL) tablet 975 mg (975 mg Oral $Given 9/28/24 0743)   gabapentin (NEURONTIN) capsule 200 mg (200 mg Oral $Given 9/28/24 0743)   oxyCODONE IR (ROXICODONE) tablet 10 mg (10 mg Oral $Given 9/28/24 0743)   HYDROmorphone (DILAUDID) injection 0.4 mg (0.4 mg Intravenous $Given 9/28/24 0527)   melatonin tablet 5 mg (5 mg Oral $Given 9/28/24 0025)   polyethylene glycol (MIRALAX) Packet 17 g (17 g Oral $Given 9/28/24 0743)   ondansetron (ZOFRAN ODT) ODT tab 4 mg (has no administration in time range)     Or   ondansetron (ZOFRAN) injection 4 mg (has no administration in time range)   hydrALAZINE (APRESOLINE) injection 10 mg (has no administration in time range)   diazepam (VALIUM) tablet 5 mg (5 mg Oral $Given 9/28/24 0213)   HYDROcodone-acetaminophen (NORCO) 5-325 MG per tablet 1 tablet (1 tablet Oral $Given 9/27/24 1207)   HYDROmorphone (PF) (DILAUDID) injection 0.5 mg (0.5 mg Intravenous $Given 9/27/24 1520)   HYDROmorphone (DILAUDID) tablet 2 mg (2 mg Oral $Given 9/27/24 1737)   HYDROmorphone (PF) (DILAUDID) injection 0.5 mg (0.5 mg Intravenous $Given 9/27/24 0438)   ondansetron (ZOFRAN) injection 4 mg (4 mg Intravenous $Given 9/27/24 9833)     Labs Ordered and Resulted from Time of ED Arrival to Time of ED Departure   COMPREHENSIVE METABOLIC PANEL - Abnormal       Result Value    Sodium 141      Potassium 3.8      Carbon Dioxide (CO2) 21 (*)     Anion Gap 12      Urea Nitrogen 11.3       Creatinine 0.89      GFR Estimate >90      Calcium 9.3      Chloride 108 (*)     Glucose 189 (*)     Alkaline Phosphatase 69      AST 31      ALT 45      Protein Total 8.2      Albumin 4.2      Bilirubin Total 0.4     ROUTINE UA WITH MICROSCOPIC REFLEX TO CULTURE - Abnormal    Color Urine Light Yellow      Appearance Urine Clear      Glucose Urine Negative      Bilirubin Urine Negative      Ketones Urine Negative      Specific Gravity Urine 1.017      Blood Urine Negative      pH Urine 5.0      Protein Albumin Urine Negative      Urobilinogen Urine Normal      Nitrite Urine Negative      Leukocyte Esterase Urine Negative      Bacteria Urine Few (*)     Mucus Urine Present (*)     RBC Urine 1      WBC Urine 4      Squamous Epithelials Urine 1     CBC WITH PLATELETS AND DIFFERENTIAL - Abnormal    WBC Count 7.9      RBC Count 5.88      Hemoglobin 14.8      Hematocrit 47.2      MCV 80      MCH 25.2 (*)     MCHC 31.4 (*)     RDW 12.7      Platelet Count 349      % Neutrophils 66      % Lymphocytes 25      % Monocytes 7      % Eosinophils 2      % Basophils 1      % Immature Granulocytes 0      NRBCs per 100 WBC 0      Absolute Neutrophils 5.2      Absolute Lymphocytes 2.0      Absolute Monocytes 0.5      Absolute Eosinophils 0.2      Absolute Basophils 0.1      Absolute Immature Granulocytes 0.0      Absolute NRBCs 0.0     BASIC METABOLIC PANEL - Abnormal    Sodium 134 (*)     Potassium 4.1      Chloride 103      Carbon Dioxide (CO2) 21 (*)     Anion Gap 10      Urea Nitrogen 11.7      Creatinine 0.73      GFR Estimate >90      Calcium 9.0      Glucose 173 (*)    CBC WITH PLATELETS - Abnormal    WBC Count 8.2      RBC Count 5.52      Hemoglobin 13.9      Hematocrit 42.9      MCV 78      MCH 25.2 (*)     MCHC 32.4      RDW 12.5      Platelet Count 334     INR - Normal    INR 1.02     PARTIAL THROMBOPLASTIN TIME - Normal    aPTT 27     TYPE AND SCREEN, ADULT    ABO/RH(D) O POS      Antibody Screen Negative      SPECIMEN  EXPIRATION DATE [id number]     ABO/RH TYPE AND SCREEN     Thoracic spine MRI w/o contrast   Final Result   Impression:    Multilevel posterior disc osteophyte complexes, most pronounced at   T11-12 with mild to moderate spinal canal stenosis and at T12-L1 with   mild spinal canal stenosis. No high-grade neural foraminal stenosis.      I have personally reviewed the examination and initial interpretation   and I agree with the findings.      LANIE LEDBETTER MD            SYSTEM ID:  [id number]      MR Lumbar Spine w/o Contrast   Final Result   Impression:    1. Redemonstration of the large extruded posterior disc osteophyte   complexes at L3-4 and L4-5 with resultant severe spinal canal   stenosis. Also moderate spinal canal stenoses at L5-S1 as a result of   a disc osteophyte complex.   2. Moderate right neural foraminal stenosis at L4-5 and severe left   neural foraminal stenosis at L5-S1.         I have personally reviewed the examination and initial interpretation   and I agree with the findings.      LANIE LEDBETTER MD            SYSTEM ID:  [id number]      CT Lumbar Spine w/o Contrast   Final Result   Impression:    1. Large disc osteophyte complexes at the L3-4 and L4-5 levels result   in severe spinal canal stenosis. Additionally, there is moderate to   severe narrowing at the L5-S1 level secondary to a disc osteophyte   complex.   2. Multilevel neural foraminal narrowing is greatest at the left L5-S1   neural foramina where there is severe narrowing secondary to severe   facet arthropathy, heterotopic bone formation and a partially   sacralized left L5 segment.   3. Mild thoracic spondylosis, as detailed above, without high-grade   spinal canal or neural foraminal narrowing.      I have personally reviewed the examination and initial interpretation   and I agree with the findings.      REGI SEWELL MD            SYSTEM ID:  [id number]      CT Thoracic Spine w/o Contrast   Final Result   Impression:    1.  Large disc osteophyte complexes at the L3-4 and L4-5 levels result   in severe spinal canal stenosis. Additionally, there is moderate to   severe narrowing at the L5-S1 level secondary to a disc osteophyte   complex.   2. Multilevel neural foraminal narrowing is greatest at the left L5-S1   neural foramina where there is severe narrowing secondary to severe   facet arthropathy, heterotopic bone formation and a partially   sacralized left L5 segment.   3. Mild thoracic spondylosis, as detailed above, without high-grade   spinal canal or neural foraminal narrowing.      I have personally reviewed the examination and initial interpretation   and I agree with the findings.      REGI SEWELL MD            SYSTEM ID:  [id number]             Critical care was not performed.     Medical Decision Making  The patient's presentation was of high complexity (an acute health issue posing potential threat to life or bodily function).    The patient's evaluation involved:  review of external note(s) from 3+ sources (see separate area of note for details)  review of 3+ test result(s) ordered prior to this encounter (see separate area of note for details)  ordering and/or review of 3+ test(s) in this encounter (see separate area of note for details)  discussion of management or test interpretation with another health professional (see separate area of note for details)    The patient's management necessitated high risk (a parenteral controlled substance) and high risk (a decision regarding hospitalization).    Assessment & Plan   28-year-old male history of previous right BKA has had some chronic sciatica slight symptoms on the right leg this morning noted bilateral leg numbness saddle anesthesia urinary incontinence present to the ER for evaluation motor still intact at this point.  Low back midline tenderness noted.  Consultation with neurosurgery right away.  Ordered an MRI of the thoracic lumbar spine CT thoracic lumbar spine  also ordered per neurosurgery they reviewed these findings also reviewed the patient here in the ER for several hours which she took get the MRI they still felt there were not adequate images done as far as axial views of the thoracic MRI.  Here in the ER told they had examined the patient more felt this point there was not an emergent concern to rush patient to surgery for concern of acute cauda equina syndrome.  The meantime Medrol Dosepak was ordered pain control and admitted to medicine service.  Continue neurosurgery to evaluate keep n.p.o. after midnight.  At this point concern neurological symptoms and discretion per their specialist neurosurgery after they evaluate the patient along with MRI findings CT etc.       I have reviewed the nursing notes. I have reviewed the findings, diagnosis, plan and need for follow up with the patient.    New Prescriptions    No medications on file       Final diagnoses:   Chronic midline low back pain with bilateral sciatica   Bilateral leg numbness   Urinary retention   Urinary incontinence, unspecified type         [facility] EMERGENCY DEPARTMENT  9/27/2024      This note was created at least in part by the use of dragon voice dictation system. Inadvertent typographical errors may still exist.  Irving Julien MD.  Patient evaluated in the emergency department during the COVID-19 pandemic period. Careful attention to patients safety was addressed throughout the evaluation. Evaluation and treatment management was initiated with disposition made efficiently and appropriate as possible to minimize any risk of potential exposure to patient during this evaluation.       Irving Julien MD  09/28/24 [id number]

## 2024-09-27 NOTE — CONSULTS
"[facility]       NEUROSURGERY CONSULTATION NOTE    This consultation was requested by Dr. Irving Julien from the Emergency Medicine service.    Reason for Consultation: Concern for cauda equina     HPI: Mario Calderon is a 28 year old male with past medical history of asthma and right foot crush injury s/p Syme amputation (6/8/2016). Patient presented to [facility] emergency department with symptoms of worsening back pain, bilateral radicular pain and numbness of bilateral lower extremities, and new urinary incontinence. Neurosurgery was consulted for concern for cauda equina.     Patient reports waking up around 0800 this morning with new numbness from waist down and radicular pain in bilateral lower extremities. Patient reports when he stood up, he loss control of his bladder function. Patient reports history of right sided radicular pain from low back down back of his leg consistent with L5 distrubution. However he is now reporting pain from low back down both legs. Patient describes numbness as \"layer covering his skin.\" Patient endorses saddle anesthesia.  Rectal tone could not be examined due to location of patient in the ED. Patient was able to void after arriving to ED, however he reports that he had to strain and \"it felt different than normal.\"  Post-void bladder scan was 234 mL. No hyper-reflexia was noted, however clonus 2-3 beats was present in left foot. On exam BLEs were full strength however patient reports subjective weakness and imbalance. Sensation change consistent with T12 level. No Peña's present. Bilateral upper extremities intact and full strength. Cranial nerves were intact. Imaging is required to diagnose cauda equina, other differentials would include transverse myelitis, multiple sclerosis and conus medullaris.      Plan to obtain MRI Thoracic & Lumbar spine. Unfortunately patient's body habitus prevents him from fitting in standard MRI. " MRI was notified about urgency of MRI and they are unable to perform MRI until Cardiac MRI is available at 1500. Despite the urgency MRI supervisor informed writer that patient will be the 2nd patient scheduled for larger MRI.  CT thoracic and Lumbar spine will be obtained while waiting for MRI.        PAST MEDICAL HISTORY:   Past Medical History:   Diagnosis Date    ADD (attention deficit disorder)     Allergic rhinitis due to animal dander     Allergy to mold spores     8/28/12 skin tests pos. for:  dog(+)/DM/M/T/G/W    Asthma, exercise induced     excersise induced    Diagnostic skin and sensitization tests 8/28/12 skin tests pos. for:  dog(+)/DM/M/T/G/W    Essential hypertension with goal blood pressure less than 140/90 9/1/2017    House dust mite allergy     Seasonal allergic conjunctivitis     Seasonal allergic rhinitis        PAST SURGICAL HISTORY:   Past Surgical History:   Procedure Laterality Date    PE TUBES      ZZC HAND/FINGER SURGERY UNLISTED      ZZC TOTAL KNEE ARTHROPLASTY         FAMILY HISTORY:   Family History   Problem Relation Age of Onset    Psychotic Disorder Mother     Thyroid Disease Mother     Blood Disease Mother         hep b    Diabetes Maternal Grandmother     Cerebrovascular Disease Maternal Grandmother     Glaucoma Maternal Grandmother     Asthma Brother     Asthma Sister     Asthma Brother     Hypertension No family hx of     Macular Degeneration No family hx of     Cancer Maternal Aunt        SOCIAL HISTORY:   Social History     Tobacco Use    Smoking status: Never    Smokeless tobacco: Never   Substance Use Topics    Alcohol use: No       MEDICATIONS:  (Not in a hospital admission)      Allergies:  No Known Allergies    ROS: 10 point ROS of systems including Constitutional, Eyes, Respiratory, Cardiovascular, Gastroenterology, Genitourinary, Integumentary, Muscularskeletal, Psychiatric were all negative except for pertinent positives noted in my HPI.    Physical exam:   Blood  pressure (!) 144/95, pulse 97, temperature 98.2  F (36.8  C), temperature source Oral, resp. rate 16, height 1.829 m (6'), weight 149.7 kg (330 lb), SpO2 97%.  CV: RRR, no murmurs, rubs, or gallops  PULM: breathing comfortably on room air  ABD: soft, non-distended  NEUROLOGIC:  -- Awake; Alert; oriented x 3  -- Follows commands briskly  -- Speech fluent, spontaneous. No aphasia or dysarthria.  -- no gaze preference. No apparent hemineglect.  Cranial Nerves:  -- visual fields full to confrontation, PERRL 3-2mm bilat and brisk, extraocular movements intact  -- face symmetrical, tongue midline  -- sensory V1-V3 intact bilaterally  -- palate elevates symmetrically, uvula midline  -- hearing grossly intact bilat  -- Trapezii 5/5 strength bilat symmetric    Motor:  Normal bulk / tone; no tremor, rigidity, or bradykinesia.  No muscle wasting or fasciculations  No Pronator Drift     Delt Bi Tri Hand Flexion/  Extension Iliopsoas Quadriceps Hamstrings Tibialis Anterior Gastroc    C5 C6 C7 C8/T1 L2 L3 L4-S1 L4 S1   R 5 5 5 5 5 5 5 * *   L 5 5 5 5 5 5 5 5 5   *Right foot amputation    Sensory:  intact to LT in bilateral upper extremities. Significant decreased sensation from approximately T12 level    Reflexes: No Peña's, Clonus in left foot (2-3 beats)    Gait: Deferred       LABS:  Last Comprehensive Metabolic Panel:  Sodium   Date Value Ref Range Status   09/01/2017 137 133 - 144 mmol/L Final     Potassium   Date Value Ref Range Status   09/01/2017 4.1 3.4 - 5.3 mmol/L Final     Chloride   Date Value Ref Range Status   09/01/2017 104 94 - 109 mmol/L Final     Carbon Dioxide   Date Value Ref Range Status   09/01/2017 24 20 - 32 mmol/L Final     Anion Gap   Date Value Ref Range Status   09/01/2017 9 3 - 14 mmol/L Final     Glucose   Date Value Ref Range Status   09/01/2017 109 (H) 70 - 99 mg/dL Final     Comment:     Non Fasting     Urea Nitrogen   Date Value Ref Range Status   09/01/2017 9 7 - 30 mg/dL Final      Creatinine   Date Value Ref Range Status   09/01/2017 0.88 0.66 - 1.25 mg/dL Final     GFR Estimate   Date Value Ref Range Status   09/01/2017 >90 >60 mL/min/1.7m2 Final     Comment:     Non  GFR Calc     Calcium   Date Value Ref Range Status   09/01/2017 9.2 8.5 - 10.1 mg/dL Final     Lab Results   Component Value Date    WBC 7.9 09/27/2024    WBC 8.4 11/20/2015     Lab Results   Component Value Date    RBC 5.88 09/27/2024    RBC 6.09 11/20/2015     Lab Results   Component Value Date    HGB 14.8 09/27/2024    HGB 15.7 11/20/2015     Lab Results   Component Value Date    HCT 47.2 09/27/2024    HCT 46.7 11/20/2015     Lab Results   Component Value Date    MCV 80 09/27/2024    MCV 77 11/20/2015     Lab Results   Component Value Date    MCH 25.2 09/27/2024    MCH 25.8 11/20/2015     Lab Results   Component Value Date    MCHC 31.4 09/27/2024    MCHC 33.6 11/20/2015     Lab Results   Component Value Date    RDW 12.7 09/27/2024    RDW 14.9 11/20/2015     Lab Results   Component Value Date     09/27/2024     11/20/2015       IMAGING:  No imaging available    ASSESSMENT:  Mario Calderon is a 28 year old male with past medical history of asthma and right foot crush injury s/p Syme amputation (6/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 Patient presented to [facility] emergency department with symptoms of worsening back pain, bilateral radicular pain and numbness of bilateral lower extremities, and new urinary incontinence. Neurosurgery was consulted for concern for cauda equina. Plan to obtain MRI Thoracic & Lumbar spine. CT thoracic and Lumbar spine will be obtained while waiting for MRI.        Clinically Significant Risk Factors Present on Admission                  # Hypertension: Noted on problem list   - not taking antihypertensives   # Severe Obesity: Estimated body mass index is 44.76 kg/m  as calculated from the following:    Height as of this encounter: 1.829 m (6').    Weight as of this encounter: 149.7 kg (330  lb).       # Asthma: noted on problem list       RECOMMENDATIONS:  CT Thoracic/Lumbar spine STAT  MRI Thoracic/Lumbar spine STAT  HOB > 30 degrees  Neuro exams Q 1 hour until MRI completed    Pain control per primary team  Maintain SBP < 140  NPO   Continue PVR bladder scans  Glucose < 180   Continue glucose checks  Platelets > 100,000  INR < 1.5  Hemoglobin > 8      The patient was discussed with Dr. Burton, neurosurgery chief resident, and Dr. Dudley, neurosurgery staff, and they agree with the above.    Gen Wu, APRN, CNP  Neurosurgery  Pager [phone]

## 2024-09-27 NOTE — ED TRIAGE NOTES
Pt with a history of right BKA presents with a 1 day history of worsening low back pain and numbness along with bilateral leg pain and numbness.  Pt states he has been struggling with sciatic pain to his right side but today the pain is bilateral and he was unable to control his bladder this morning.      Triage Assessment (Adult)       Row Name 09/27/24 1149          Triage Assessment    Airway WDL WDL        Respiratory WDL    Respiratory WDL WDL        Skin Circulation/Temperature WDL    Skin Circulation/Temperature WDL WDL        Cardiac WDL    Cardiac WDL WDL        Peripheral/Neurovascular WDL    Peripheral Neurovascular WDL WDL        Cognitive/Neuro/Behavioral WDL    Cognitive/Neuro/Behavioral WDL WDL

## 2024-09-28 ENCOUNTER — APPOINTMENT (OUTPATIENT)
Dept: MRI IMAGING | Facility: CLINIC | Age: 29
DRG: 552 | End: 2024-09-28
Payer: COMMERCIAL

## 2024-09-28 LAB
ANION GAP SERPL CALCULATED.3IONS-SCNC: 10 MMOL/L (ref 7–15)
BUN SERPL-MCNC: 11.7 MG/DL (ref 6–20)
CALCIUM SERPL-MCNC: 9 MG/DL (ref 8.8–10.4)
CHLORIDE SERPL-SCNC: 103 MMOL/L (ref 98–107)
CREAT SERPL-MCNC: 0.73 MG/DL (ref 0.67–1.17)
EGFRCR SERPLBLD CKD-EPI 2021: >90 ML/MIN/1.73M2
ERYTHROCYTE [DISTWIDTH] IN BLOOD BY AUTOMATED COUNT: 12.5 % (ref 10–15)
GLUCOSE SERPL-MCNC: 173 MG/DL (ref 70–99)
HCO3 SERPL-SCNC: 21 MMOL/L (ref 22–29)
HCT VFR BLD AUTO: 42.9 % (ref 40–53)
HGB BLD-MCNC: 13.9 G/DL (ref 13.3–17.7)
MCH RBC QN AUTO: 25.2 PG (ref 26.5–33)
MCHC RBC AUTO-ENTMCNC: 32.4 G/DL (ref 31.5–36.5)
MCV RBC AUTO: 78 FL (ref 78–100)
PLATELET # BLD AUTO: 334 10E3/UL (ref 150–450)
POTASSIUM SERPL-SCNC: 4.1 MMOL/L (ref 3.4–5.3)
RBC # BLD AUTO: 5.52 10E6/UL (ref 4.4–5.9)
SODIUM SERPL-SCNC: 134 MMOL/L (ref 135–145)
WBC # BLD AUTO: 8.2 10E3/UL (ref 4–11)

## 2024-09-28 PROCEDURE — 72157 MRI CHEST SPINE W/O & W/DYE: CPT | Mod: 26 | Performed by: RADIOLOGY

## 2024-09-28 PROCEDURE — 250N000011 HC RX IP 250 OP 636: Performed by: STUDENT IN AN ORGANIZED HEALTH CARE EDUCATION/TRAINING PROGRAM

## 2024-09-28 PROCEDURE — A9585 GADOBUTROL INJECTION: HCPCS

## 2024-09-28 PROCEDURE — 36415 COLL VENOUS BLD VENIPUNCTURE: CPT

## 2024-09-28 PROCEDURE — 85027 COMPLETE CBC AUTOMATED: CPT

## 2024-09-28 PROCEDURE — 99207 PR APP CREDIT; MD BILLING SHARED VISIT: CPT

## 2024-09-28 PROCEDURE — 120N000002 HC R&B MED SURG/OB UMMC

## 2024-09-28 PROCEDURE — 255N000002 HC RX 255 OP 636

## 2024-09-28 PROCEDURE — 80048 BASIC METABOLIC PNL TOTAL CA: CPT

## 2024-09-28 PROCEDURE — 250N000013 HC RX MED GY IP 250 OP 250 PS 637

## 2024-09-28 PROCEDURE — 250N000012 HC RX MED GY IP 250 OP 636 PS 637

## 2024-09-28 PROCEDURE — 99233 SBSQ HOSP IP/OBS HIGH 50: CPT | Mod: FS | Performed by: STUDENT IN AN ORGANIZED HEALTH CARE EDUCATION/TRAINING PROGRAM

## 2024-09-28 PROCEDURE — 250N000011 HC RX IP 250 OP 636

## 2024-09-28 PROCEDURE — 72157 MRI CHEST SPINE W/O & W/DYE: CPT

## 2024-09-28 RX ORDER — IBUPROFEN 200 MG
1000 TABLET ORAL PRN
Status: ON HOLD | COMMUNITY
End: 2024-10-01

## 2024-09-28 RX ORDER — GADOBUTROL 604.72 MG/ML
0.1 INJECTION INTRAVENOUS ONCE
Status: COMPLETED | OUTPATIENT
Start: 2024-09-28 | End: 2024-09-28

## 2024-09-28 RX ORDER — DIAZEPAM 5 MG
5 TABLET ORAL EVERY 6 HOURS PRN
Status: DISCONTINUED | OUTPATIENT
Start: 2024-09-28 | End: 2024-10-01 | Stop reason: HOSPADM

## 2024-09-28 RX ADMIN — OXYCODONE HYDROCHLORIDE 10 MG: 10 TABLET ORAL at 03:25

## 2024-09-28 RX ADMIN — HYDROMORPHONE HYDROCHLORIDE 0.4 MG: 0.2 INJECTION, SOLUTION INTRAMUSCULAR; INTRAVENOUS; SUBCUTANEOUS at 00:21

## 2024-09-28 RX ADMIN — HYDROMORPHONE HYDROCHLORIDE 0.4 MG: 0.2 INJECTION, SOLUTION INTRAMUSCULAR; INTRAVENOUS; SUBCUTANEOUS at 05:27

## 2024-09-28 RX ADMIN — METHYLPREDNISOLONE 4 MG: 4 TABLET ORAL at 13:14

## 2024-09-28 RX ADMIN — OXYCODONE HYDROCHLORIDE 10 MG: 10 TABLET ORAL at 07:43

## 2024-09-28 RX ADMIN — ACETAMINOPHEN 975 MG: 325 TABLET ORAL at 13:14

## 2024-09-28 RX ADMIN — METHYLPREDNISOLONE 4 MG: 4 TABLET ORAL at 07:44

## 2024-09-28 RX ADMIN — ACETAMINOPHEN 975 MG: 325 TABLET ORAL at 07:43

## 2024-09-28 RX ADMIN — GABAPENTIN 200 MG: 100 CAPSULE ORAL at 13:14

## 2024-09-28 RX ADMIN — GADOBUTROL 14.9 ML: 604.72 INJECTION INTRAVENOUS at 16:08

## 2024-09-28 RX ADMIN — GABAPENTIN 200 MG: 100 CAPSULE ORAL at 20:40

## 2024-09-28 RX ADMIN — ACETAMINOPHEN 975 MG: 325 TABLET ORAL at 20:40

## 2024-09-28 RX ADMIN — HYDROMORPHONE HYDROCHLORIDE 1 MG: 1 INJECTION, SOLUTION INTRAMUSCULAR; INTRAVENOUS; SUBCUTANEOUS at 15:42

## 2024-09-28 RX ADMIN — HYDROMORPHONE HYDROCHLORIDE 0.4 MG: 0.2 INJECTION, SOLUTION INTRAMUSCULAR; INTRAVENOUS; SUBCUTANEOUS at 17:10

## 2024-09-28 RX ADMIN — GABAPENTIN 200 MG: 100 CAPSULE ORAL at 07:43

## 2024-09-28 RX ADMIN — HYDROMORPHONE HYDROCHLORIDE 0.4 MG: 0.2 INJECTION, SOLUTION INTRAMUSCULAR; INTRAVENOUS; SUBCUTANEOUS at 22:40

## 2024-09-28 RX ADMIN — POLYETHYLENE GLYCOL 3350 17 G: 17 POWDER, FOR SOLUTION ORAL at 07:43

## 2024-09-28 RX ADMIN — Medication 5 MG: at 00:25

## 2024-09-28 RX ADMIN — METHYLPREDNISOLONE 8 MG: 8 TABLET ORAL at 22:40

## 2024-09-28 RX ADMIN — DIAZEPAM 5 MG: 5 TABLET ORAL at 02:13

## 2024-09-28 RX ADMIN — METHYLPREDNISOLONE 4 MG: 4 TABLET ORAL at 18:50

## 2024-09-28 ASSESSMENT — ACTIVITIES OF DAILY LIVING (ADL)
ADLS_ACUITY_SCORE: 35
DEPENDENT_IADLS:: INDEPENDENT

## 2024-09-28 NOTE — PLAN OF CARE
Goal Outcome Evaluation:  A&Ox4. AVSS. MRI thoracic/spine is  done this afternoon.Pt reported severe bilateral buttock pain after patient lies in MRI for 40 minutes or so on his back/buttock; some relief with dilaudid 0.4 mg IV.Pt needs to be NPO after MN for tomorrow possible neurosurgery.  Pt goes outside for brief via a wheelchair. Pt with R leg below knee amputation, pt uses prosthetic leg.  Continue with POC

## 2024-09-28 NOTE — PROGRESS NOTES
[facility]    Medicine Progress Note - Hospitalist Service, GOLD TEAM 11    Date of Admission:  9/27/2024    Assessment & Plan   Mario Calderon is a 27 yo M with pmhx of R sided sciatica, s/p R foot amputation 2016, HTN, ADHD admitted on 9/27/2024 due to c/f cauda equina vs other acute neurologic disorder as pt woke up on 9/27/24 w/ sudden onset of BL LE numbness and radicular pain, loss of bladder control, saddle anesthesia. He has been followed closely by NSGY and has undergone MRIs of his spine, remains admitted for close monitoring and continued NSGY assistance.      Severe spinal and foraminal stenosis, c/f cauda equina vs other acute neurologic disorder  BL LE paresthesias, subjective weakness, radicular pain  Saddle anesthesia  Bladder incontinence  C/f urinary retention  H/o R sided sciatica  P/w sudden onset of BL LE numbness/ radicular pain/ subjective weakness, saddle anesthesia, loss of bladder control on morning of 9/27. Does have h/o R sided sciatica w/ pain down his R leg, but has never had BL symptoms and has never had this severity. Saw a chiropractor on 9/25 for sciatic pain-- no pain immediately after the appointment, but now w/ this presentation. After loss of bladder control in AM, started to note some retention, though still able to void w/ straining. Seen by NSGY in the ED who requested stat MRIs. Unfortunately this was delayed 2/2 body habitus. Lumbar MRI demonstrated severe canal stenosis at L3-4/L4-5 w/ moderate stenosis at L5-S1; moderate R neural foraminal stenosis at L4-5 and severe L neural foraminal stenosis at L5-S1. Thoracic MRI w/ mild to moderate spinal canal stenosis at T11-L1, no high grade foraminal stenosis though this was unable to be completed as pt was in too much pain to finish exam. Presentation is suspicious for cauda equina, though maintaining large ddx while awaiting further diagnostic recommendations from NSGY.  Discussed with NSGY this am who is requesting repeat of thoracic MRI given inability to complete and will see pt and provider further recommendations based on this.   - NSGY consulted and following, appreciate recommendations  - Platelets > 100,000, INR < 1.5, Hemoglobin > 8  - Maintain SBP < 140  - Stat thoracic MRI w/ contrast ordered   - Pt NPO since midnight, NSGY says cont this pending thoracic PRN   - Pain control: Scheduled tylenol, scheduled gabapentin 200mg TID, oxycodone 10mg Q4H PRN, dilaudid 0.4mg Q3H PRN              -- Adjust as needed  - Medrol dose mary  - Q4h neuro checks   - PRN bladder scan and straight cath  - PRN hydralazine for SBP > 140 x2    Hyponatremia - mild: Na 134, likely given poor PO intake as pt NPO. Cont to trend, if remains NPO for long duration consider IVF.  H/o R foot amputation: Has prosthetic.   HTN: Not noted to be on PTA meds on admission. PRN hydralazine as above.           Diet: NPO per Anesthesia Guidelines for Procedure/Surgery Except for: Meds    DVT Prophylaxis: Pneumatic Compression Devices  [place]ios Catheter: Not present  Lines: None     Cardiac Monitoring: None  Code Status: Full Code      Clinically Significant Risk Factors Present on Admission         # Hyponatremia: Lowest Na = 134 mmol/L in last 2 days, will monitor as appropriate          # Hypertension: Noted on problem list         # Severe Obesity: Estimated body mass index is 44.76 kg/m  as calculated from the following:    Height as of this encounter: 1.829 m (6').    Weight as of this encounter: 149.7 kg (330 lb).       # Asthma: noted on problem list        Disposition Plan     Medically Ready for Discharge: Anticipated in 2-4 Days       The patient's care was discussed with the Attending Physician, Dr. Morley, Patient, and NSGY Consultant(s).    Opal Buchanan PA-C  Hospitalist Service, GOLD TEAM [address] [place] [address]  Securely message with [employer] (more info)  Text page  "via [facility] Paging/Directory   See signed in provider for up to date coverage information  ______________________________________________________________________    Interval History   Pt reports feeling well, still having pain in his lower back, left leg continues to feel numb though right leg has gotten better. Also feels incontinence is improving. Has no new symptoms. Discussed plan as above and pt is agreeable.    Physical Exam   Vital Signs: Temp: 97.4  F (36.3  C) Temp src: Oral BP: 125/74 Pulse: 59   Resp: 20 SpO2: 97 % O2 Device: None (Room air)    Weight: 330 lbs 0 oz  Constitutional: lying in bed, appears comfortable, no apparent distress.  HEENT: Mucous membranes moist, no scleral icterus   Respiratory: No increased work of breathing, breathing comfortably on RA   Skin: normal skin color, texture, and no jaundice  Musculoskeletal: moving all extremities voluntarily during exam, no LE edema   Neuro: awake, alert, answering all questions appropriately during exam, sensation intact to b/l LE though on LLE pt reports touch feels like \"pins and needles\" pt does have 5/5 strength to b/l LE     Medical Decision Making       40 MINUTES SPENT BY ME on the date of service doing chart review, history, exam, documentation & further activities per the note.      Data     I have personally reviewed the following data over the past 24 hrs:    8.2  \   13.9   / 334     134 (L) 103 11.7 /  173 (H)   4.1 21 (L) 0.73 \     ALT: 45 AST: 31 AP: 69 TBILI: 0.4   ALB: 4.2 TOT PROTEIN: 8.2 LIPASE: N/A     INR:  1.02 PTT:  27   D-dimer:  N/A Fibrinogen:  N/A       "

## 2024-09-28 NOTE — MEDICATION SCRIBE - ADMISSION MEDICATION HISTORY
Medication Scribe Admission Medication History    Admission medication history is complete. The information provided in this note is only as accurate as the sources available at the time of the update.    Information Source(s): Patient via in-person    Pertinent Information: Spoke with patient in person and patient stats that he only takes Ibuprofen 200 MG OTC PRN. He states that he does not take any prescribed medications at this time.     Changes made to PTA medication list:  Added: Ibuprofen 200 MG   Deleted: None  Changed: None    Allergies reviewed with patient and updates made in EHR: no    Medication History Completed By: Chante Meza 9/28/2024 11:04 AM    PTA Med List   Medication Sig Last Dose    ibuprofen (ADVIL/MOTRIN) 200 MG tablet Take 1,000 mg by mouth as needed for pain. 9/27/2024 at PM

## 2024-09-28 NOTE — H&P
[facility]    History and Physical - Hospitalist Service, GOLD TEAM        Date of Admission:  9/27/2024    Assessment & Plan      Mario Calderon is a 28 year old male w/ PMH of R sided sciatica, s/p R foot amputation 2016, HTN, ADHD admitted on 9/27/2024 due to c/f cauda equina vs other acute neurologic disorder. Woke up on 9/27/24 w/ sudden onset of BL LE numbness and radicular pain, loss of bladder control, saddle anesthesia. Seen by NSGY in the ED, not deemed to be in need of emergent surgery.     #Severe spinal and foraminal stenosis, c/f cauda equina vs other acute neurologic disorder  #BL LE paresthesias, subjective weakness, radicular pain  #Saddle anesthesia  #Bladder incontinence  #C/f urinary retention  #H/o R sided sciatica  Presenting with sudden onset of BL LE numbness/ radicular pain/ subjective weakness, saddle anesthesia, loss of bladder control on morning of 9/27. Does have h/o R sided sciatica w/ pain down his R leg, but has never had BL symptoms and has never had this severity. Saw a chiropractor on 9/25 for sciatic pain-- no pain immediately after the appointment, but now w/ this presentation. Did report increased pain prior to chiropractor appt, hence seeking care. After loss of bladder control in AM, started to note some retention, though still able to void w/ straining.   Seen by NSGY in the ED who requested stat MRIs. Unfortunately this was delayed 2/2 body habitus. Underwent CT thoracic and lumbar spine w/ eventual completion of MRI thoracic and lumbar spine. Lumbar MRI demonstrated severe canal stenosis at L3-4/L4-5 w/ moderate stenosis at L5-S1; moderate R neural foraminal stenosis at L4-5 and severe L neural foraminal stenosis at L5-S1. Thoracic MRI w/ mild to moderate spinal canal stenosis at T11-L1, no high grade foraminal stenosis.   Presentation is of course suspicious for cauda equina, though maintaining open ddx while awaiting  further diagnostic recommendations from NSGY.   - NSGY consulted and following, appreciate recommendations  - Platelets > 100,000, INR < 1.5, Hemoglobin > 8  - Maintain SBP < 140  - NPO @ midnight  - Pain control: Scheduled tylenol, scheduled gabapentin 200mg TID, oxycodone 10mg Q4H PRN, dilaudid 0.4mg Q3H PRN   -- Adjust as needed  - Medrol dose mary  - PRN bladder scan and straight cath  - PRN hydralazine for SBP > 140 x2    #H/o R foot amputation: Has prosthetic.     #HTN: Not noted to be on PTA meds on admission. PRN hydralazine as above.             Diet: NPO per Anesthesia Guidelines for Procedure/Surgery Except for: Meds  NPO per Anesthesia Guidelines for Procedure/Surgery Except for: Meds  Combination Diet Regular Diet Adult    DVT Prophylaxis: Pneumatic Compression Devices  Palacios Catheter: Not present  Lines: None     Cardiac Monitoring: None  Code Status: Full Code    Clinically Significant Risk Factors Present on Admission                  # Hypertension: Noted on problem list         # Severe Obesity: Estimated body mass index is 44.76 kg/m  as calculated from the following:    Height as of this encounter: 1.829 m (6').    Weight as of this encounter: 149.7 kg (330 lb).       # Asthma: noted on problem list        Disposition Plan     Medically Ready for Discharge: Anticipated in 2-4 Days         The patient's care was discussed with the Attending Physician, Dr. Altamirano and Patient.    Brian Nguyen PA-C  Hospitalist Service, [facility]  Securely message with [employer] (more info)  Text page via [employer] Paging/Directory   See signed in provider for up to date coverage information    ______________________________________________________________________    Chief Complaint   BL LE numbness/ radicular pain/ subjective weakness, saddle anesthesia, loss of bladder control     History is obtained from the patient and electronic health record    History of  Present Illness   Mario Calderon is a 28 year old male who has PMH of R sided sciatica, s/p R foot amputation 2016, HTN, ADHD.  Presenting with sudden onset of BL LE numbness/ radicular pain/ subjective weakness, saddle anesthesia, loss of bladder control on morning of 9/27. Does have h/o R sided sciatica w/ pain down his R leg, but has never had BL symptoms and has never had this severity. Saw a chiropractor on 9/25 for sciatic pain-- no pain immediately after the appointment, but now w/ this presentation. Did report increased pain prior to chiropractor appt, hence seeking care. After loss of bladder control in AM, started to note some retention, though still able to void w/ straining.         Past Medical History    Past Medical History:   Diagnosis Date    ADD (attention deficit disorder)     Allergic rhinitis due to animal dander     Allergy to mold spores     8/28/12 skin tests pos. for:  dog(+)/DM/M/T/G/W    Asthma, exercise induced     excersise induced    Diagnostic skin and sensitization tests 8/28/12 skin tests pos. for:  dog(+)/DM/M/T/G/W    Essential hypertension with goal blood pressure less than 140/90 9/1/2017    House dust mite allergy     Seasonal allergic conjunctivitis     Seasonal allergic rhinitis        Past Surgical History   Past Surgical History:   Procedure Laterality Date    PE TUBES      ZZC HAND/FINGER SURGERY UNLISTED      ZZC TOTAL KNEE ARTHROPLASTY         Prior to Admission Medications   None        Review of Systems    The 10 point Review of Systems is negative other than noted in the HPI or here.     Allergies   No Known Allergies  ------------------------------------------------------------------------     Physical Exam   Vital Signs: Temp: 98.2  F (36.8  C) Temp src: Oral BP: (!) 144/95 Pulse: 97   Resp: 16 SpO2: 97 % O2 Device: None (Room air)    Weight: 330 lbs 0 oz    General: Reclining in chair, does appear uncomfortable.   Respiratory: Breathing comfortably on room  air.   MSK: Moving BL UE w/o issue, readjusting self with e/o pain, but does not appear unable to readjust.   Neuro: Moving BL LE, though exam not performed 2/2 pain. Defer to neurosurgery exam.    Skin: Warm and dry.   Psych: Pleasant and cooperative. Alert and oriented.       Medical Decision Making       75 MINUTES SPENT BY ME on the date of service doing chart review, history, exam, documentation & further activities per the note.      Data   ------------------------- PAST 24 HR DATA REVIEWED -----------------------------------------------    I have personally reviewed the following data over the past 24 hrs:    7.9  \   14.8   / 349     141 108 (H) 11.3 /  189 (H)   3.8 21 (L) 0.89 \     ALT: 45 AST: 31 AP: 69 TBILI: 0.4   ALB: 4.2 TOT PROTEIN: 8.2 LIPASE: N/A     INR:  1.02 PTT:  27   D-dimer:  N/A Fibrinogen:  N/A       Imaging results reviewed over the past 24 hrs:   Recent Results (from the past 24 hour(s))   CT Thoracic Spine w/o Contrast    Narrative    Thoracic and Lumbar spine CT without contrast    History: acute back pain.    Comparison: None    Technique: Axial, coronal, and sagittal multiplanar reconstructions  obtained from acquisition of thoracic and lumbar spine CT scan without  contrast.    Findings:  Thoracic spine: There is no acute fracture or subluxation. There is no  prevertebral edema. There is no high-grade spinal canal stenosis at  any level. Disc osteophyte complexes resulting in mild spinal canal  narrowing at the T7-8 and T8-9 levels. Disc osteophyte complex at the  T11-12 space results in mild-to-moderate left neural foraminal  narrowing and probable mild spinal canal narrowing. Small disc  osteophyte complex at the T12-L1 level results in mild right neural  foraminal and mild spinal canal narrowing.No soft tissue abnormality  in the visualized paraspinous tissues anteriorly.    Lumbar Spine: There is no acute fracture or subluxation. There are 4  lumbar type vertebra,, with partial  sacralization of the L5 vertebral  segment.   Multilevel disc space narrowing is greatest at the L3-4, L4-5 and  L5-S1 levels. There is ankylosis of the posterior elements of L5 and  S1 on the left.    On a level by level basis, the findings are as follows:    L1-2:  There is no focal abnormality.  L2-3:  Disc osteophyte complex with mild spinal canal stenosis. Mild  right neural foraminal narrowing. No significant left neural foraminal  narrowing.  L3-4:  Large disc osteophyte complex resulting in severe spinal canal  stenosis. Mild to moderate right and mild left left neural foraminal  narrowing. Mild facet arthropathy.  L4-5:  Large disc osteophyte complex resulting in severe spinal canal  stenosis. Moderate right and mild left neural foraminal narrowing.  L5-S1:  Large disc osteophyte complex resulting in moderate severe  spinal canal stenosis. Mild right neural foraminal narrowing. Severe  left neural foraminal narrowing secondary to heterotopic bone  formation and severe facet arthrosis. Partially sacralized left L5  segment.    The visualized paraspinous tissues anteriorly are unremarkable.      Impression    Impression:   1. Large disc osteophyte complexes at the L3-4 and L4-5 levels result  in severe spinal canal stenosis. Additionally, there is moderate to  severe narrowing at the L5-S1 level secondary to a disc osteophyte  complex.  2. Multilevel neural foraminal narrowing is greatest at the left L5-S1  neural foramina where there is severe narrowing secondary to severe  facet arthropathy, heterotopic bone formation and a partially  sacralized left L5 segment.  3. Mild thoracic spondylosis, as detailed above, without high-grade  spinal canal or neural foraminal narrowing.    I have personally reviewed the examination and initial interpretation  and I agree with the findings.    REGI SEWELL MD         SYSTEM ID:  [id number]   CT Lumbar Spine w/o Contrast    Narrative    Thoracic and Lumbar spine CT  without contrast    History: acute back pain.    Comparison: None    Technique: Axial, coronal, and sagittal multiplanar reconstructions  obtained from acquisition of thoracic and lumbar spine CT scan without  contrast.    Findings:  Thoracic spine: There is no acute fracture or subluxation. There is no  prevertebral edema. There is no high-grade spinal canal stenosis at  any level. Disc osteophyte complexes resulting in mild spinal canal  narrowing at the T7-8 and T8-9 levels. Disc osteophyte complex at the  T11-12 space results in mild-to-moderate left neural foraminal  narrowing and probable mild spinal canal narrowing. Small disc  osteophyte complex at the T12-L1 level results in mild right neural  foraminal and mild spinal canal narrowing.No soft tissue abnormality  in the visualized paraspinous tissues anteriorly.    Lumbar Spine: There is no acute fracture or subluxation. There are 4  lumbar type vertebra,, with partial sacralization of the L5 vertebral  segment.   Multilevel disc space narrowing is greatest at the L3-4, L4-5 and  L5-S1 levels. There is ankylosis of the posterior elements of L5 and  S1 on the left.    On a level by level basis, the findings are as follows:    L1-2:  There is no focal abnormality.  L2-3:  Disc osteophyte complex with mild spinal canal stenosis. Mild  right neural foraminal narrowing. No significant left neural foraminal  narrowing.  L3-4:  Large disc osteophyte complex resulting in severe spinal canal  stenosis. Mild to moderate right and mild left left neural foraminal  narrowing. Mild facet arthropathy.  L4-5:  Large disc osteophyte complex resulting in severe spinal canal  stenosis. Moderate right and mild left neural foraminal narrowing.  L5-S1:  Large disc osteophyte complex resulting in moderate severe  spinal canal stenosis. Mild right neural foraminal narrowing. Severe  left neural foraminal narrowing secondary to heterotopic bone  formation and severe facet arthrosis.  Partially sacralized left L5  segment.    The visualized paraspinous tissues anteriorly are unremarkable.      Impression    Impression:   1. Large disc osteophyte complexes at the L3-4 and L4-5 levels result  in severe spinal canal stenosis. Additionally, there is moderate to  severe narrowing at the L5-S1 level secondary to a disc osteophyte  complex.  2. Multilevel neural foraminal narrowing is greatest at the left L5-S1  neural foramina where there is severe narrowing secondary to severe  facet arthropathy, heterotopic bone formation and a partially  sacralized left L5 segment.  3. Mild thoracic spondylosis, as detailed above, without high-grade  spinal canal or neural foraminal narrowing.    I have personally reviewed the examination and initial interpretation  and I agree with the findings.    REGI SEWELL MD         SYSTEM ID:  [id number]   MR Lumbar Spine w/o Contrast    Narrative    MR LUMBAR SPINE W/O CONTRAST 9/27/2024 4:47 PM    Provided History: low back pain with bilateral leg numbness with  saddle numbness and urinary incontinence.    Comparison: CT same day    Technique: Sagittal T1-weighted, sagittal STIR, sagittal  diffusion-weighted (with ADC map), axial T1-weighted, and 3D  volumetric axial, coronal, and sagittal reconstructed T2-weighted  images of the lumbar spine were obtained without intravenous contrast.      Findings: There are 5 lumbar-type vertebrae with partial sacralization  of L5.  The tip of the conus medullaris is at L1.  Minimal stepwise  grade 1 retrolisthesis at L3-4 and L4-5, unchanged. Mild to moderate  disc height narrowing from L3-S1.  Degenerative edema in the opposing  endplates at L4-5.    On a level by level basis:    T12-L1: No spinal canal or neuroforaminal stenosis.    L1-2: No spinal canal or neuroforaminal stenosis.    L2-3: Small posterior disc osteophyte complex results in mild spinal  canal stenosis. Mild neural foraminal narrowing bilaterally.    L3-4: Large  centrally extruded posterior disc osteophyte complex  resulting in severe spinal canal stenosis. Mild left neural narrowing.  No right neural foraminal stenosis. Facet hypertrophy bilaterally.    L4-5: Large centrally extruded posterior disc osteophyte complex  resulting in moderate to severe spinal canal stenosis. Moderate right  and mild left neural foraminal narrowing. Facet hypertrophy  bilaterally.    L5-S1: Moderate posterior disc osteophyte complex contributing to  moderate spinal canal stenosis. Asymmetric left greater than right  facet hypertrophy with heterotopic bone formation resulting in severe  left neural foraminal stenosis. Mild neuroforaminal narrowing on the  right.    Paraspinous tissues are within normal limits.      Impression    Impression:   1. Redemonstration of the large extruded posterior disc osteophyte  complexes at L3-4 and L4-5 with resultant severe spinal canal  stenosis. Also moderate spinal canal stenoses at L5-S1 as a result of  a disc osteophyte complex.  2. Moderate right neural foraminal stenosis at L4-5 and severe left  neural foraminal stenosis at L5-S1.      I have personally reviewed the examination and initial interpretation  and I agree with the findings.    LANIE LEDBETTER MD         SYSTEM ID:  [id number]   Thoracic spine MRI w/o contrast    Narrative    MR THORACIC SPINE W/O CONTRAST 9/27/2024 5:17 PM    Provided History: sudden bilateral leg numbness and saddle anesthesia  with urinary incontinence and retention    Comparison: Same day CT    Technique: Sagittal T1-weighted, sagittal T2-weighted, sagittal STIR,  and axial T2-weighted images of the thoracic spine were obtained  without the administration of intravenous contrast. The entire T2  axial sequence was not completed as patient was in too much pain to  finish the exam.    Findings:  T2 axial sequences only performed to the level of T6 due to patient  pain. The thoracic vertebral column is in normal alignment. Mild  loss  of disc height at T4-5 and T11-12. Small posterior disc bulges and  protrusions from T4-5 through T8-9 along with endplate osteophytosis  with multilevel mild spinal canal narrowing. Moderate posterior disc  osteophyte complex at T11-12 results in mild to moderate spinal canal  stenosis and mild left neuroforaminal narrowing. Posterior disc  osteophyte complex at T12-L1 results in mild spinal canal stenosis and  mild right neural foraminal narrowing. No abnormal signal in the  thoracic spinal cord, to the extent visualized.        Impression    Impression:   Multilevel posterior disc osteophyte complexes, most pronounced at  T11-12 with mild to moderate spinal canal stenosis and at T12-L1 with  mild spinal canal stenosis. No high-grade neural foraminal stenosis.    I have personally reviewed the examination and initial interpretation  and I agree with the findings.    LANIE LEDBETTER MD         SYSTEM ID:  [id number]

## 2024-09-28 NOTE — PLAN OF CARE
Goal Outcome Evaluation: Progressing      Plan of Care Reviewed With: patient    Overall Patient Progress: no changeOverall Patient Progress: no change    Outcome Evaluation: Pt alert and oriented x4. R/A . VSS. Skin intact. Neuro intact- baseline. Plan for an MRI today. Checklist completed. One time order of Dilaudid 1 mg to be given prior to MRI. Pt independent with cares, and putting on her prosthesis. Denies shortness of breath, or nausea. Oxycodone 10 mg given x1. Report given to MEG Miller. No further concerns at this time. will continue to monitor closely.    Temp: 97.4  F (36.3  C) Temp src: Oral BP: 125/74 Pulse: 59   Resp: 20 SpO2: 94 % O2 Device: None (Room air)

## 2024-09-29 LAB
ANION GAP SERPL CALCULATED.3IONS-SCNC: 10 MMOL/L (ref 7–15)
BUN SERPL-MCNC: 15.6 MG/DL (ref 6–20)
CALCIUM SERPL-MCNC: 8.8 MG/DL (ref 8.8–10.4)
CHLORIDE SERPL-SCNC: 105 MMOL/L (ref 98–107)
CREAT SERPL-MCNC: 0.75 MG/DL (ref 0.67–1.17)
EGFRCR SERPLBLD CKD-EPI 2021: >90 ML/MIN/1.73M2
ERYTHROCYTE [DISTWIDTH] IN BLOOD BY AUTOMATED COUNT: 12.8 % (ref 10–15)
GLUCOSE SERPL-MCNC: 172 MG/DL (ref 70–99)
HCO3 SERPL-SCNC: 21 MMOL/L (ref 22–29)
HCT VFR BLD AUTO: 43.4 % (ref 40–53)
HGB BLD-MCNC: 14.4 G/DL (ref 13.3–17.7)
MCH RBC QN AUTO: 25.7 PG (ref 26.5–33)
MCHC RBC AUTO-ENTMCNC: 33.2 G/DL (ref 31.5–36.5)
MCV RBC AUTO: 78 FL (ref 78–100)
PLATELET # BLD AUTO: 336 10E3/UL (ref 150–450)
POTASSIUM SERPL-SCNC: 4.2 MMOL/L (ref 3.4–5.3)
RBC # BLD AUTO: 5.6 10E6/UL (ref 4.4–5.9)
SODIUM SERPL-SCNC: 136 MMOL/L (ref 135–145)
WBC # BLD AUTO: 10 10E3/UL (ref 4–11)

## 2024-09-29 PROCEDURE — 250N000011 HC RX IP 250 OP 636

## 2024-09-29 PROCEDURE — 36415 COLL VENOUS BLD VENIPUNCTURE: CPT

## 2024-09-29 PROCEDURE — 85027 COMPLETE CBC AUTOMATED: CPT

## 2024-09-29 PROCEDURE — 120N000002 HC R&B MED SURG/OB UMMC

## 2024-09-29 PROCEDURE — 80048 BASIC METABOLIC PNL TOTAL CA: CPT

## 2024-09-29 PROCEDURE — 99232 SBSQ HOSP IP/OBS MODERATE 35: CPT | Performed by: INTERNAL MEDICINE

## 2024-09-29 PROCEDURE — 250N000013 HC RX MED GY IP 250 OP 250 PS 637

## 2024-09-29 PROCEDURE — 250N000013 HC RX MED GY IP 250 OP 250 PS 637: Performed by: INTERNAL MEDICINE

## 2024-09-29 PROCEDURE — 250N000012 HC RX MED GY IP 250 OP 636 PS 637

## 2024-09-29 RX ADMIN — ACETAMINOPHEN 975 MG: 325 TABLET ORAL at 13:42

## 2024-09-29 RX ADMIN — GABAPENTIN 200 MG: 100 CAPSULE ORAL at 20:11

## 2024-09-29 RX ADMIN — METHYLPREDNISOLONE 4 MG: 4 TABLET ORAL at 08:41

## 2024-09-29 RX ADMIN — ACETAMINOPHEN 975 MG: 325 TABLET ORAL at 08:39

## 2024-09-29 RX ADMIN — HYDROMORPHONE HYDROCHLORIDE 0.4 MG: 0.2 INJECTION, SOLUTION INTRAMUSCULAR; INTRAVENOUS; SUBCUTANEOUS at 02:05

## 2024-09-29 RX ADMIN — POLYETHYLENE GLYCOL 3350 17 G: 17 POWDER, FOR SOLUTION ORAL at 08:40

## 2024-09-29 RX ADMIN — METHYLPREDNISOLONE 4 MG: 4 TABLET ORAL at 17:09

## 2024-09-29 RX ADMIN — CALCIUM CARBONATE (ANTACID) CHEW TAB 500 MG 1000 MG: 500 CHEW TAB at 02:42

## 2024-09-29 RX ADMIN — OXYCODONE HYDROCHLORIDE 10 MG: 10 TABLET ORAL at 03:11

## 2024-09-29 RX ADMIN — GABAPENTIN 200 MG: 100 CAPSULE ORAL at 08:39

## 2024-09-29 RX ADMIN — DIAZEPAM 5 MG: 5 TABLET ORAL at 03:54

## 2024-09-29 RX ADMIN — OXYCODONE HYDROCHLORIDE 10 MG: 10 TABLET ORAL at 13:41

## 2024-09-29 RX ADMIN — METHYLPREDNISOLONE 4 MG: 4 TABLET ORAL at 13:42

## 2024-09-29 RX ADMIN — ACETAMINOPHEN 975 MG: 325 TABLET ORAL at 20:10

## 2024-09-29 RX ADMIN — GABAPENTIN 200 MG: 100 CAPSULE ORAL at 13:42

## 2024-09-29 RX ADMIN — HYDROMORPHONE HYDROCHLORIDE 0.4 MG: 0.2 INJECTION, SOLUTION INTRAMUSCULAR; INTRAVENOUS; SUBCUTANEOUS at 18:53

## 2024-09-29 RX ADMIN — METHYLPREDNISOLONE 4 MG: 4 TABLET ORAL at 21:47

## 2024-09-29 RX ADMIN — HYDROMORPHONE HYDROCHLORIDE 2 MG: 2 TABLET ORAL at 08:39

## 2024-09-29 ASSESSMENT — ACTIVITIES OF DAILY LIVING (ADL)
ADLS_ACUITY_SCORE: 37
ADLS_ACUITY_SCORE: 37
ADLS_ACUITY_SCORE: 35
ADLS_ACUITY_SCORE: 37
ADLS_ACUITY_SCORE: 35
ADLS_ACUITY_SCORE: 37
ADLS_ACUITY_SCORE: 35
ADLS_ACUITY_SCORE: 37
ADLS_ACUITY_SCORE: 37
ADLS_ACUITY_SCORE: 35
ADLS_ACUITY_SCORE: 35

## 2024-09-29 NOTE — PROGRESS NOTES
Temp: 97.9  F (36.6  C) Temp src: Oral BP: (!) 138/90 Pulse: 85   Resp: 18 SpO2: 99 % O2 Device: None (Room air)      A/Ox4. Neuro checks Q4.  Cardiac: WDL  Stating 99% RA  GI/: voiding spontaneously but states there is a bit of strain (he is unsure if it is related to his numbness). NO BM this shift, pt is unsure if lack of BM is related to numbness or other reasons. Suggested some PRN bowel medications to ensure movement.   Diet: Changed from NPO to Regular as no surgical interventions planned today.   Pain: rates 5-7/8. States PRN pain meds don't really touch the pain/numbness at this time.   Skin: no new deficits.   LDAs: L PIV: SL  Activity: Pt independent from bed to wheelchair, around room and unit. Self adjusts in bed.     Neuro to consult tomorrow for further surgical intervention plan. Continue to manage and monitor pain.
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
## 2024-09-29 NOTE — PROGRESS NOTES
Care Management Follow Up    Length of Stay (days): 2    Expected Discharge Date: 09/29/2024     Concerns to be Addressed: all concerns addressed in this encounter     Patient plan of care discussed at interdisciplinary rounds: No    Anticipated Discharge Disposition: Home  Anticipated Discharge Services: None (no additional services)  Anticipated Discharge DME: None    Patient/family educated on Medicare website which has current facility and service quality ratings: no  Education Provided on the Discharge Plan:    Patient/Family in Agreement with the Plan: yes    Referrals Placed by CM/SW:  [facility] task  Private pay costs discussed: Not applicable    Discussed  Partnership in Safe Discharge Planning  document with patient/family: No     Handoff Completed: No, handoff not indicated or clinically appropriate - no PCP at this time    Additional Information:    SW following for discharge planning. Per CARLIE Handy's note on 9/28, pt needs a PCP and to establish care with [facility]. [facility] task initiated for this.     Next Steps:   Care Management to follow for safe discharge planning.    Soraida Fowler, MSW   9/29/2024       Social Work and Care Management Department       SEARCHABLE in [facility] - search SOCIAL WORK       [place] (0800 - 1630) Saturday and Sunday     Units: 4A Vocera, 4C Vocera, & 4E Vocera        Units: 5A [id number] Vocera, 5A 5870-1883 Vocera , BMT SW 1 BMT SW 2, BMT SW 3 & BMT SW 4  5C Off Service 5401 - 5416  5C Off Service [id number]     Units: 6A Vocera & 6B Vocera      Units: 6C Vocera     Units: 7A Vocera & 7B Vocera      Units: 7C Med Surg 7401 thru 7418 and 7C Med Surg 7502 thru 7521      Unit: [place] ED Vocera & [place] Obs Vocera     [facility] ([phone]) Saturday and Sunday      Units: 5 Ortho Vocera, 5 Med Surg Vocera & WB ED Vocera     Units: 6 Med Surg Vocera, 8 Med Surg Vocera, & 10 ICU Vocera      After hours Vocera [facility] and After Hours Vocera [place]     Saturday &  Sunday (1630 - 0000)    Mon-Fri (8516-9230)     [facility] Recognized Holidays  ([id number])    Units: ALL   - see above VOCERA links to units and after hours

## 2024-09-29 NOTE — PROGRESS NOTES
[facility], [place]     Neurosurgery Progress Note:  09/29/2024  This note was cosigned mistakenly - I am not neurosurg team member.  For some reason this note ended up with my credentials / requesting signature - I've left this note in the chart unchanged except for this comment.  Sandor Arrington MD      Interval History:   - MRI L spine with  L3-4 and L4-5 disc osteophyte complex with resultant severe spinal canal stenosis. MRI T spine with T11-12 disc, but unable to obtain axial scans due to intolerance  - Continues to endorse pain in the low back and radicular distribution.  Stable numbness.    - Reports 1 episode where he thought he finished peeing but he accidentally wet himself. no episodes of incontinence.    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  Neurosurgery consulted for evaluation.    Clinically Significant Risk Factors         # Hyponatremia: Lowest Na = 134 mmol/L in last 2 days, will monitor as appropriate          # Hypertension: Noted on problem list           # Severe Obesity: Estimated body mass index is 44.76 kg/m  as calculated from the following:    Height as of this encounter: 1.829 m (6').    Weight as of this encounter: 149.7 kg (330 lb)., PRESENT ON ADMISSION       # Financial/Environmental Concerns: other (see comments) (concerned that his MA was inactive)  # Asthma: noted on problem list        Plan:  Serial neuro exams  Pain control with medrol Dosepak, tylenol, torodol, ibuprofen  Consider pain management consult  MRI T spine  Final surgical plan pending imaging  OK for diet  Hold off all AC/AP  Ok for SQH    -----------------------------------  Casey Youssef MD  Neurosurgery resident  Pager [phone]  -----------------------------------    Gen:  Appears comfortable, NAD  Neurologic:  Alert & Oriented to person, place, time, and situation  Follows commands briskly  Speech fluent, spontaneous. No aphasia or dysarthria.  No gaze preference. No apparent hemineglect.  PERRL, EOMI  Face symmetric with sensation intact to light touch  Palate elevates symmetrically, uvula midline, tongue protrudes midline  Trapezii muscles 5/5 bilaterally  No pronator drift     Del Tr Bi WE WF Gr   R 5 5 5 5 5 5   L 5 5 5 5 5 5    HF KE KF DF PF EHL   R 5 5 5 5 5 5   L 5 5 5 5 5 5   *Right foot amputation     Reflexes 2+ throughout    Numbness in BLE, more on the outer aspect, left worse than right. Numbness in the saddle region, more on the left    Objective:   Temp:  [97.5  F (36.4  C)-97.8  F (36.6  C)] 97.8  F (36.6  C)  Pulse:  [85-92] 85  Resp:  [16-18] 18  BP: (128-133)/(77-88) 133/77  Cuff Mean (mmHg):  [101] 101  SpO2:  [92 %-98 %] 92 %  I/O last 3 completed shifts:  In: 240 [P.O.:240]  Out: 1500 [Urine:1500]        LABS:  Recent Labs   Lab 09/29/24  0556 09/28/24  0621 09/27/24  1206    134* 141   POTASSIUM 4.2 4.1 3.8   CHLORIDE 105 103 108*   CO2 21* 21* 21*   ANIONGAP 10 10 12   * 173* 189*   BUN 15.6 11.7 11.3   CR 0.75 0.73 0.89   LISA 8.8 9.0 9.3       Recent Labs   Lab 09/29/24  0556   WBC 10.0   RBC 5.60   HGB 14.4   HCT 43.4   MCV 78   MCH 25.7*   MCHC 33.2   RDW 12.8          IMAGING:  Recent Results (from the past 24 hour(s))   MR Thoracic Spine w/o & w Contrast    Narrative    MR THORACIC SPINE W/O & W CONTRAST 9/28/2024 5:00 PM    Provided History: pt with bladder incontinence, low back pain, LE  numbess, did not finish recent thoracic spine MRI,NSGY needing for  management plan  ICD-10:    Comparison: MRI thoracic spine 9/27/2024.    Technique:  Sagittal T1-weighted, sagittal T2-weighted, sagittal STIR, sagittal  diffusion weighted, and axial T2-weighted images through the thoracic  spine were obtained without intravenous contrast.  Following  intravenous administration of gadolinium, axial and sagittal  T1-weighted images with fat saturation were also obtained.    Contrast: IV Gadavist    Findings:  Normal thoracic vertebral alignment.  And axial T2-weighted images are  degraded by patient motion, however there is no definite cord signal  abnormality. Mild loss of disc height at C4-5 and T11-12. Multiple  small posterior disc bulges and protrusions from T4-T9 and T11-L1  contributing to multilevel mild to moderate spinal canal stenosis.  Largest disc protrusion at T11-12 in the left subarticular zone.  Multilevel thoracic facet hypertrophy. Mild neural foraminal narrowing  on the left at T10-11 and T11-12.    No abnormal contrast enhancement within the thoracic spinal cord,  thecal sac or vertebral column.      Impression    Impression:  1. Multilevel thoracic spondylosis with mild to moderate spinal canal  stenosis, most pronounced at T11-12.  2. No definite myelopathic cord signal.    LANIE LEDBETTER MD         SYSTEM ID:  [id number]       Please contact neurosurgery resident on call with questions.    Dial * * [phone], enter [phone] when prompted.

## 2024-09-29 NOTE — PROVIDER NOTIFICATION
"\"Pt's pain has been 7/10 or 8/10 pain all night. I've given 0.4 dilaudid, valium, and oxycodone during the night and all are not seeming to touch his pain. I gave him heat packs and those are seeming to help a little, but he is still in a lot of pain. Is there anything else we can do for him?\"    \"A one time dose of 1 mg of dilaudid was ordered during day shift but the day shift nurse did not give it at that time because the pt did not want it. I've only been giving 0.4 mg of dilaudid.\"  "

## 2024-09-29 NOTE — PROGRESS NOTES
[facility]    Medicine Progress Note - Hospitalist Service, GOLD TEAM 7    Date of Admission:  9/27/2024    Assessment & Plan   Mario Calderon is a 29 yo M with pmhx of R sided sciatica, s/p R foot amputation 2016, HTN, ADHD admitted on 9/27/2024 due to c/f cauda equina vs other acute neurologic disorder as pt woke up on 9/27/24 w/ sudden onset of BL LE numbness and radicular pain, loss of bladder control, saddle anesthesia. He has been followed closely by NSGY and has undergone MRIs of his spine, remains admitted for close monitoring and continued NSGY assistance.      Severe spinal and foraminal stenosis, c/f cauda equina vs other acute neurologic disorder  BL LE paresthesias, subjective weakness, radicular pain  Saddle anesthesia  Bladder incontinence  C/f urinary retention  H/o R sided sciatica  P/w sudden onset of BL LE numbness/ radicular pain/ subjective weakness, saddle anesthesia, loss of bladder control on morning of 9/27. Does have h/o R sided sciatica w/ pain down his R leg, but has never had BL symptoms and has never had this severity. Saw a chiropractor on 9/25 for sciatic pain-- no pain immediately after the appointment, but now w/ this presentation. After loss of bladder control in AM, started to note some retention, though still able to void w/ straining. Seen by NSGY in the ED who requested stat MRIs. Unfortunately this was delayed 2/2 body habitus. Lumbar MRI demonstrated severe canal stenosis at L3-4/L4-5 w/ moderate stenosis at L5-S1; moderate R neural foraminal stenosis at L4-5 and severe L neural foraminal stenosis at L5-S1. Thoracic MRI w/ mild to moderate spinal canal stenosis at T11-L1, no high grade foraminal stenosis though this was unable to be completed as pt was in too much pain to finish exam. Presentation is suspicious for cauda equina, though maintaining large ddx while awaiting further diagnostic recommendations from NSGY.  Discussed with NSGY this am who is requesting repeat of thoracic MRI given inability to complete and will see pt and provider further recommendations based on this.   - NSGY consulted and following, appreciate recommendations  - Platelets > 100,000, INR < 1.5, Hemoglobin > 8  - Maintain SBP < 140  - Pain control: Scheduled tylenol, scheduled gabapentin 200mg TID, oxycodone 10mg Q4H PRN, dilaudid 0.4mg Q3H PRN              -- Adjust as needed  - Medrol dose mary  - Q4h neuro checks   - PRN bladder scan and straight cath  - PRN hydralazine for SBP > 140 x2  - awaiting more input from surgical colleagues     Hyponatremia - mild: Na 134, likely given poor PO intake as pt NPO. Cont to trend, if remains NPO for long duration consider IVF.  H/o R foot amputation: Has prosthetic.   HTN: Not noted to be on PTA meds on admission. PRN hydralazine as above.            Diet:regular diet  DVT Prophylaxis: Pneumatic Compression Devices  [place]ios Catheter: Not present  Lines: None     Cardiac Monitoring: None  Code Status: Full Code      Clinically Significant Risk Factors         # Hyponatremia: Lowest Na = 134 mmol/L in last 2 days, will monitor as appropriate          # Hypertension: Noted on problem list           # Severe Obesity: Estimated body mass index is 44.76 kg/m  as calculated from the following:    Height as of this encounter: 1.829 m (6').    Weight as of this encounter: 149.7 kg (330 lb)., PRESENT ON ADMISSION     # Financial/Environmental Concerns: other (see comments) (concerned that his MA was inactive)  # Asthma: noted on problem list        Disposition Plan     Medically Ready for Discharge: Anticipated in 2-4 Days             Kulwant Centeno MD  Hospitalist Service, GOLD TEAM [address] [place] [address]  Securely message with [employer] (more info)  Text page via [employer] Paging/Directory   See signed in provider for up to date coverage  "information  ______________________________________________________________________    Interval History   No acute events, pain worse than numbness.      Physical Exam   Vital Signs: Temp: 97.6  F (36.4  C) Temp src: Oral BP: (!) 140/101 Pulse: 85   Resp: 18 SpO2: 98 % O2 Device: None (Room air)    Weight: 330 lbs 0 oz    Gen: NAD  HEENT: EOMI, PERRL, MMM  CV: extremities warm and well perfused  Resp: breathing comfortably on RA  : deferred  Msk: no LE edema  Skin: no rashes  Musculoskeletal: moving all extremities voluntarily during exam, no LE edema   Neuro: awake, alert, answering all questions appropriately during exam, sensation intact to b/l LE though on LLE pt reports touch feels like \"pins and needles\" pt does have 5/5 strength to b/l LE      "

## 2024-09-29 NOTE — PROGRESS NOTES
[facility], [place]     Neurosurgery Progress Note:  09/29/2024      Interval History:   - Continues to endorse pain in the low back and radicular distribution.  Stable numbness.      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  Neurosurgery consulted for evaluation.    Clinically Significant Risk Factors         # Hyponatremia: Lowest Na = 134 mmol/L in last 2 days, will monitor as appropriate          # Hypertension: Noted on problem list           # Severe Obesity: Estimated body mass index is 44.76 kg/m  as calculated from the following:    Height as of this encounter: 1.829 m (6').    Weight as of this encounter: 149.7 kg (330 lb)., PRESENT ON ADMISSION       # Financial/Environmental Concerns: other (see comments) (concerned that his MA was inactive)  # Asthma: noted on problem list        Plan:  Serial neuro exams  Pain control with medrol Dosepak, tylenol, torodol, ibuprofen  Consider pain management consult  MRI T spine completed  Ongoing discussions with our spine staff re: surgical plan  OK for diet  Hold off all AC/AP  Ok for SQH    -----------------------------------  Oliva Quiñones MD, PGY-1  Department of Neurosurgery  Pager: [phone]    Please contact neurosurgery resident on call with questions.    Dial * * [phone], enter [phone] when prompted.   -----------------------------------    Gen: Appears comfortable, NAD  Neurologic:  Alert & Oriented to person, place, time, and situation  Follows commands briskly  Speech fluent, spontaneous. No aphasia or dysarthria.  No gaze preference. No apparent hemineglect.  PERRL, EOMI  Face symmetric with sensation intact to light touch  Palate elevates symmetrically, uvula midline, tongue protrudes midline  Trapezii  muscles 5/5 bilaterally  No pronator drift     Del Tr Bi WE WF Gr   R 5 5 5 5 5 5   L 5 5 5 5 5 5    HF KE KF DF PF EHL   R 5 5 5 0 0 0   L 5 5 5 5 5 5   *Right foot amputation     Reflexes 2+ throughout    Numbness in BLE, more on the outer aspect, left worse than right. Numbness in the saddle region, more on the left    Objective:   Temp:  [97.5  F (36.4  C)-97.9  F (36.6  C)] 97.9  F (36.6  C)  Pulse:  [85-92] 85  Resp:  [18] 18  BP: (128-140)/() 138/90  Cuff Mean (mmHg):  [112] 112  SpO2:  [92 %-99 %] 99 %  I/O last 3 completed shifts:  In: 240 [P.O.:240]  Out: 625 [Urine:625]        LABS:  Recent Labs   Lab 09/29/24  0556 09/28/24  0621 09/27/24  1206    134* 141   POTASSIUM 4.2 4.1 3.8   CHLORIDE 105 103 108*   CO2 21* 21* 21*   ANIONGAP 10 10 12   * 173* 189*   BUN 15.6 11.7 11.3   CR 0.75 0.73 0.89   LISA 8.8 9.0 9.3       Recent Labs   Lab 09/29/24  0556   WBC 10.0   RBC 5.60   HGB 14.4   HCT 43.4   MCV 78   MCH 25.7*   MCHC 33.2   RDW 12.8          IMAGING:  No results found for this or any previous visit (from the past 24 hour(s)).      Please contact neurosurgery resident on call with questions.    Dial * * [phone], enter [phone] when prompted.

## 2024-09-29 NOTE — PLAN OF CARE
Blood pressure 133/77, pulse 85, temperature 97.8  F (36.6  C), temperature source Oral, resp. rate 18, height 1.829 m (6'), weight 149.7 kg (330 lb), SpO2 92%.     Neuro: A&Ox4.   Cardiac: SR. VSS.   Respiratory: Sating 92 on RA.  GI/: Pt straining to urinate. Pt independently uses urinal.   Diet/appetite: NPO  Activity: Independent. Pt can independently get in and out of wheelchair.   Pain: 7/10 - 8/10 pain throughout the shift. Pt's main complaint is numb, sore, achey pain in his right side due to sleeping on the right side throughout the night. Pt is unable to lay supine due to pain in buttocks. Hot packs seemed to help with the pt's pain.   Skin: No new deficits noted.  LDA's: L PIV saline locked.     Plan: Continue with POC. Notify primary team with changes. Plan is to have a procedure with neurosurgery.     Pt awake and in 7/10 or 8/10 pain all night, (look in flowsheets and MAR for specifics). Pt's pain managed with heat packs as well as medication. Writer notified provider about pt's pain, did not get a response, pt's pain should continue to be monitored and managed.     Goal Outcome Evaluation:      Plan of Care Reviewed With: patient    Overall Patient Progress: no change

## 2024-09-29 NOTE — PROGRESS NOTES
[facility], [place]     Neurosurgery Progress Note:  09/28/2024      Interval History:   - MRI L spine with  L3-4 and L4-5 disc osteophyte complex with resultant severe spinal canal stenosis. MRI T spine with T11-12 disc, but unable to obtain axial scans due to intolerance  - Continues to endorse pain in the low back and radicular distribution.  Stable numbness.    - Reports 1 episode where he thought he finished peeing but he accidentally wet himself. no episodes of incontinence.    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  Neurosurgery consulted for evaluation.    Clinically Significant Risk Factors Present on Admission         # Hyponatremia: Lowest Na = 134 mmol/L in last 2 days, will monitor as appropriate          # Hypertension: Noted on problem list         # Severe Obesity: Estimated body mass index is 44.76 kg/m  as calculated from the following:    Height as of this encounter: 1.829 m (6').    Weight as of this encounter: 149.7 kg (330 lb).       # Financial/Environmental Concerns: other (see comments) (concerned that his MA was inactive)  # Asthma: noted on problem list       Plan:  Serial neuro exams  Pain control with medrol Dosepak, tylenol, torodol, ibuprofen  Consider pain management consult  MRI T spine  Final surgical plan pending imaging  OK for diet  Hold off all AC/AP  Ok for SQH    -----------------------------------  Casey Youssef MD  Neurosurgery resident  Pager [phone]  -----------------------------------    Gen: Appears comfortable, NAD  Neurologic:  Alert & Oriented to person, place, time, and situation  Follows commands briskly  Speech fluent, spontaneous. No aphasia or dysarthria.  No gaze preference. No apparent hemineglect.  PERRL, EOMI  Face  symmetric with sensation intact to light touch  Palate elevates symmetrically, uvula midline, tongue protrudes midline  Trapezii muscles 5/5 bilaterally  No pronator drift     Del Tr Bi WE WF Gr   R 5 5 5 5 5 5   L 5 5 5 5 5 5    HF KE KF DF PF EHL   R 5 5 5 5 5 5   L 5 5 5 5 5 5   *Right foot amputation     Reflexes 2+ throughout    Numbness in BLE, more on the outer aspect, left worse than right. Numbness in the saddle region, more on the left    Objective:   Temp:  [97.4  F (36.3  C)-98.1  F (36.7  C)] 97.7  F (36.5  C)  Pulse:  [59-80] 59  Resp:  [16-20] 16  BP: (125-155)/(74-89) 132/88  Cuff Mean (mmHg):  [101] 101  SpO2:  [94 %-98 %] 98 %  I/O last 3 completed shifts:  In: -   Out: 1350 [Urine:1350]        LABS:  Recent Labs   Lab 09/28/24  0621 09/27/24  1206   * 141   POTASSIUM 4.1 3.8   CHLORIDE 103 108*   CO2 21* 21*   ANIONGAP 10 12   * 189*   BUN 11.7 11.3   CR 0.73 0.89   LISA 9.0 9.3       Recent Labs   Lab 09/28/24  0621   WBC 8.2   RBC 5.52   HGB 13.9   HCT 42.9   MCV 78   MCH 25.2*   MCHC 32.4   RDW 12.5          IMAGING:  Recent Results (from the past 24 hour(s))   MR Thoracic Spine w/o & w Contrast    Narrative    MR THORACIC SPINE W/O & W CONTRAST 9/28/2024 5:00 PM    Provided History: pt with bladder incontinence, low back pain, LE  numbess, did not finish recent thoracic spine MRI,NSGY needing for  management plan  ICD-10:    Comparison: MRI thoracic spine 9/27/2024.    Technique:  Sagittal T1-weighted, sagittal T2-weighted, sagittal STIR, sagittal  diffusion weighted, and axial T2-weighted images through the thoracic  spine were obtained without intravenous contrast. Following  intravenous administration of gadolinium, axial and sagittal  T1-weighted images with fat saturation were also obtained.    Contrast: IV Gadavist    Findings:  Normal thoracic vertebral alignment.  And axial T2-weighted images are  degraded by patient motion, however there is no definite cord  signal  abnormality. Mild loss of disc height at C4-5 and T11-12. Multiple  small posterior disc bulges and protrusions from T4-T9 and T11-L1  contributing to multilevel mild to moderate spinal canal stenosis.  Largest disc protrusion at T11-12 in the left subarticular zone.  Multilevel thoracic facet hypertrophy. Mild neural foraminal narrowing  on the left at T10-11 and T11-12.    No abnormal contrast enhancement within the thoracic spinal cord,  thecal sac or vertebral column.      Impression    Impression:  1. Multilevel thoracic spondylosis with mild to moderate spinal canal  stenosis, most pronounced at T11-12.  2. No definite myelopathic cord signal.    LANIE LEDBETTER MD         SYSTEM ID:  [id number]       Please contact neurosurgery resident on call with questions.    Dial * * [phone], enter [phone] when prompted.

## 2024-09-30 PROCEDURE — 99231 SBSQ HOSP IP/OBS SF/LOW 25: CPT | Performed by: NURSE PRACTITIONER

## 2024-09-30 PROCEDURE — 250N000011 HC RX IP 250 OP 636

## 2024-09-30 PROCEDURE — 250N000013 HC RX MED GY IP 250 OP 250 PS 637: Performed by: INTERNAL MEDICINE

## 2024-09-30 PROCEDURE — 250N000013 HC RX MED GY IP 250 OP 250 PS 637

## 2024-09-30 PROCEDURE — 120N000002 HC R&B MED SURG/OB UMMC

## 2024-09-30 PROCEDURE — 250N000012 HC RX MED GY IP 250 OP 636 PS 637

## 2024-09-30 PROCEDURE — 99232 SBSQ HOSP IP/OBS MODERATE 35: CPT | Performed by: INTERNAL MEDICINE

## 2024-09-30 RX ORDER — LACTULOSE 10 G/15ML
20 SOLUTION ORAL 2 TIMES DAILY
Status: DISCONTINUED | OUTPATIENT
Start: 2024-09-30 | End: 2024-10-01 | Stop reason: HOSPADM

## 2024-09-30 RX ORDER — BUPROPION HYDROCHLORIDE 150 MG/1
150 TABLET, EXTENDED RELEASE ORAL DAILY
Status: DISCONTINUED | OUTPATIENT
Start: 2024-09-30 | End: 2024-09-30

## 2024-09-30 RX ORDER — BUPROPION HYDROCHLORIDE 150 MG/1
150 TABLET, EXTENDED RELEASE ORAL 2 TIMES DAILY
Status: DISCONTINUED | OUTPATIENT
Start: 2024-10-03 | End: 2024-10-01 | Stop reason: HOSPADM

## 2024-09-30 RX ORDER — PREGABALIN 50 MG/1
50 CAPSULE ORAL 3 TIMES DAILY
Status: DISCONTINUED | OUTPATIENT
Start: 2024-09-30 | End: 2024-10-01 | Stop reason: HOSPADM

## 2024-09-30 RX ORDER — BUPROPION HYDROCHLORIDE 150 MG/1
150 TABLET, EXTENDED RELEASE ORAL DAILY
Status: DISCONTINUED | OUTPATIENT
Start: 2024-09-30 | End: 2024-10-01 | Stop reason: HOSPADM

## 2024-09-30 RX ORDER — BUPROPION HYDROCHLORIDE 150 MG/1
150 TABLET, EXTENDED RELEASE ORAL 2 TIMES DAILY
Status: DISCONTINUED | OUTPATIENT
Start: 2024-10-03 | End: 2024-09-30

## 2024-09-30 RX ADMIN — HYDROMORPHONE HYDROCHLORIDE 0.4 MG: 0.2 INJECTION, SOLUTION INTRAMUSCULAR; INTRAVENOUS; SUBCUTANEOUS at 03:16

## 2024-09-30 RX ADMIN — HYDROMORPHONE HYDROCHLORIDE 0.4 MG: 0.2 INJECTION, SOLUTION INTRAMUSCULAR; INTRAVENOUS; SUBCUTANEOUS at 13:17

## 2024-09-30 RX ADMIN — METHYLPREDNISOLONE 4 MG: 4 TABLET ORAL at 22:15

## 2024-09-30 RX ADMIN — HYDROMORPHONE HYDROCHLORIDE 2 MG: 2 TABLET ORAL at 09:12

## 2024-09-30 RX ADMIN — DIAZEPAM 5 MG: 5 TABLET ORAL at 17:02

## 2024-09-30 RX ADMIN — BUPROPION HYDROCHLORIDE 150 MG: 150 TABLET, FILM COATED, EXTENDED RELEASE ORAL at 13:17

## 2024-09-30 RX ADMIN — OXYCODONE HYDROCHLORIDE 10 MG: 10 TABLET ORAL at 17:29

## 2024-09-30 RX ADMIN — ACETAMINOPHEN 975 MG: 325 TABLET ORAL at 08:24

## 2024-09-30 RX ADMIN — HYDROMORPHONE HYDROCHLORIDE 0.4 MG: 0.2 INJECTION, SOLUTION INTRAMUSCULAR; INTRAVENOUS; SUBCUTANEOUS at 00:02

## 2024-09-30 RX ADMIN — METHYLPREDNISOLONE 4 MG: 4 TABLET ORAL at 08:24

## 2024-09-30 RX ADMIN — HYDROMORPHONE HYDROCHLORIDE 0.4 MG: 0.2 INJECTION, SOLUTION INTRAMUSCULAR; INTRAVENOUS; SUBCUTANEOUS at 08:21

## 2024-09-30 RX ADMIN — PREGABALIN 50 MG: 50 CAPSULE ORAL at 20:12

## 2024-09-30 RX ADMIN — DIAZEPAM 5 MG: 5 TABLET ORAL at 10:09

## 2024-09-30 RX ADMIN — ACETAMINOPHEN 975 MG: 325 TABLET ORAL at 20:12

## 2024-09-30 RX ADMIN — ACETAMINOPHEN 975 MG: 325 TABLET ORAL at 13:17

## 2024-09-30 RX ADMIN — POLYETHYLENE GLYCOL 3350 17 G: 17 POWDER, FOR SOLUTION ORAL at 08:25

## 2024-09-30 RX ADMIN — LACTULOSE 20 G: 20 SOLUTION ORAL at 20:12

## 2024-09-30 RX ADMIN — HYDROMORPHONE HYDROCHLORIDE 2 MG: 2 TABLET ORAL at 22:16

## 2024-09-30 RX ADMIN — METHYLPREDNISOLONE 4 MG: 4 TABLET ORAL at 11:39

## 2024-09-30 RX ADMIN — GABAPENTIN 200 MG: 100 CAPSULE ORAL at 08:24

## 2024-09-30 RX ADMIN — SENNOSIDES AND DOCUSATE SODIUM 2 TABLET: 8.6; 5 TABLET ORAL at 20:12

## 2024-09-30 RX ADMIN — PREGABALIN 50 MG: 50 CAPSULE ORAL at 13:17

## 2024-09-30 ASSESSMENT — ACTIVITIES OF DAILY LIVING (ADL)
ADLS_ACUITY_SCORE: 37
ADLS_ACUITY_SCORE: 22
ADLS_ACUITY_SCORE: 37
ADLS_ACUITY_SCORE: 22
ADLS_ACUITY_SCORE: 37
ADLS_ACUITY_SCORE: 22
ADLS_ACUITY_SCORE: 22
ADLS_ACUITY_SCORE: 37
ADLS_ACUITY_SCORE: 22
ADLS_ACUITY_SCORE: 22
ADLS_ACUITY_SCORE: 37
ADLS_ACUITY_SCORE: 22
ADLS_ACUITY_SCORE: 22
ADLS_ACUITY_SCORE: 37
ADLS_ACUITY_SCORE: 37
ADLS_ACUITY_SCORE: 22
ADLS_ACUITY_SCORE: 37
ADLS_ACUITY_SCORE: 37
ADLS_ACUITY_SCORE: 22
ADLS_ACUITY_SCORE: 37

## 2024-09-30 NOTE — PROGRESS NOTES
[facility]    Medicine Progress Note - Hospitalist Service, GOLD TEAM 7    Date of Admission:  9/27/2024    Assessment & Plan   Mario Calderon is a 27 yo M with pmhx of R sided sciatica, s/p R foot amputation 2016, HTN, ADHD admitted on 9/27/2024 due to c/f cauda equina vs other acute neurologic disorder as pt woke up on 9/27/24 w/ sudden onset of BL LE numbness and radicular pain, loss of bladder control, saddle anesthesia. He has been followed closely by NSGY and has undergone MRIs of his spine, remains admitted for close monitoring and continued NSGY assistance.      Changes:  Tobacco cessation suggestions, see plan  Pain: see plan  PT: ordered.  Re-discussed plan with neurosurgery, awaiting updates.    Severe spinal and foraminal stenosis, c/f cauda equina vs other acute neurologic disorder  BL LE paresthesias, subjective weakness, radicular pain  Saddle anesthesia  Bladder incontinence  C/f urinary retention  H/o R sided sciatica  P/w sudden onset of BL LE numbness/ radicular pain/ subjective weakness, saddle anesthesia, loss of bladder control on morning of 9/27. Does have h/o R sided sciatica w/ pain down his R leg, but has never had BL symptoms and has never had this severity. Saw a chiropractor on 9/25 for sciatic pain-- no pain immediately after the appointment, but now w/ this presentation. Lumbar MRI demonstrated severe canal stenosis at L3-4/L4-5 w/ moderate stenosis at L5-S1; moderate R neural foraminal stenosis at L4-5 and severe L neural foraminal stenosis at L5-S1. Thoracic MRI w/ mild to moderate spinal canal stenosis at T11-L1, Presentation is suspicious for cauda equina.  Current Neurosurgery recommendations are for a spinal fusion, after Mario has quit smoking for 3 mo and lost weight.  Subsequent discussions based on physical exam and writer's conversation with radiology on 9/30 have led to a request for neurosurgery to re-evaluate.  - NSGY  consulted and following      # Pain control: Scheduled tylenol, oxycodone 10mg Q4H PRN, dilaudid 0.4mg Q3H PRN.  Stopped gabapentin and will trial lyrica (tried higher doses of gabapentin in past for phantom limb pain without improvement, so changed to Lyrica).  Can titrate up based on response              -- Adjust as needed  - s/p Medrol dose mary  - Q4h neuro checks   - PRN bladder scan and straight cath  - PRN hydralazine for SBP > 140 x2  - awaiting more input from surgical colleagues     Hyponatremia - mild: resolved with IVF    H/o R foot amputation: Has prosthetic.   HTN: Not noted to be on PTA meds on admission. PRN hydralazine as above.     Tobacco dependence:  Goal is cessation.  Discussed options, will initiate wellbutrin SR, 150 mg daily x 3 days, and then increase to BID for 12 weeks.         Diet:regular diet  DVT Prophylaxis: Pneumatic Compression Devices  [place] Catheter: Not present  Lines: None     Cardiac Monitoring: None  Code Status: Full Code      Clinically Significant Risk Factors                  # Hypertension: Noted on problem list           # Severe Obesity: Estimated body mass index is 44.76 kg/m  as calculated from the following:    Height as of this encounter: 1.829 m (6').    Weight as of this encounter: 149.7 kg (330 lb)., PRESENT ON ADMISSION       # Financial/Environmental Concerns: other (see comments) (concerned that his MA was inactive)  # Asthma: noted on problem list        Disposition Plan     Medically Ready for Discharge: Anticipated in 2-4 Days             Kulwant Centeno MD  Hospitalist Service, GOLD TEAM 7  [facility]  Securely message with [employer] (more info)  Text page via [employer] Paging/Directory   See signed in provider for up to date coverage information  ______________________________________________________________________    Interval History   No acute events, pain and numbness ongoing    Physical Exam   Vital Signs: Temp:  "98.6  F (37  C) Temp src: Oral BP: (!) 134/91 Pulse: 77   Resp: 16 SpO2: 95 % O2 Device: None (Room air)    Weight: 330 lbs 0 oz    Gen: NAD  HEENT: EOMI, PERRL, MMM  CV: extremities warm and well perfused  Resp: breathing comfortably on RA  : deferred  Msk: no LE edema  Skin: no rashes  Musculoskeletal: moving all extremities voluntarily during exam, no LE edema   Neuro: awake, alert, answering all questions appropriately during exam, sensation intact to b/l LE though on LLE pt reports touch feels like \"pins and needles\" pt does have 5/5 strength to b/l LE. Difficult to feel light tough in groin area      "

## 2024-09-30 NOTE — PLAN OF CARE
Goal Outcome Evaluation:      Plan of Care Reviewed With: patient    Overall Patient Progress: no changeOverall Patient Progress: no change    Outcome Evaluation: Therapy ordered.     Status: Presenting with worsening low back pain, bilateral radicular pain, numbness in bilateral lower extremities   Vitals: VSS  Neuros: Numbness to bilateral buttocks down L leg.   IV: PIV-Sl'd   Labs/Electrolytes: NA  136  Resp/trach: RA   Diet: Regular; fair intake   Bowel status: No BM this shift. LBM PTA; bowel meds given   : Strains to void; voiding adequately in urinal.   Skin: Intact   Pain: Back pain managed with IV dilaudid, PO dilaudid, and Valium   Activity: Up ad isabelle. R Leg prosthetic.    Social: Family at bedside   Plan: No neurosurgical intervention at this time. Therapy ordered.   Updates this shift: Lyrica and Wellbutrin ordered.

## 2024-09-30 NOTE — PLAN OF CARE
Status: Pt admitted for worsening low back pain, bilateral radicular pain, numbness in bilateral lower extremities.   Vitals: VSS on RA  Neuros: A&Ox4 Numbness BLE Strength 5/5   IV: PIV SL   Resp/trach: on RA  Diet: Regular diet  Bowel status: LBM PTA  : Voiding via urinal.   Skin: No new deficits   Pain: 7-8/10 Hip and leg pain managed with PRN IV dilaudid  Activity: up ind. R Prosthetic/ wheelchair in room   Plan: NSG planning for surgical intervention.  Continue to monitor and follow POC.

## 2024-09-30 NOTE — CONSULTS
Discharge Pharmacy Test Claim    Patient's [employer] prepaid medical assistance plan covers pregabalin (Lyrica) with an expected monthly copay of $0.    Test Claim Cost   Pregabalin 0.00       Kelly Kilgore  [facility] Liaison  Phone: [phone] Fax: [phone]  Available on Teams & Vocera

## 2024-09-30 NOTE — PROGRESS NOTES
[facility], [place]     Neurosurgery Progress Note:  09/30/2024      Interval History:   - continues to note pain in his hips with radiation of pain to the left posterior leg.  Currently no surgical intervention is recommended given patient's age and complexity of procedure.      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  Neurosurgery consulted for evaluation.    Clinically Significant Risk Factors                  # Hypertension: Noted on problem list           # Severe Obesity: Estimated body mass index is 44.76 kg/m  as calculated from the following:    Height as of this encounter: 1.829 m (6').    Weight as of this encounter: 149.7 kg (330 lb)., PRESENT ON ADMISSION       # Financial/Environmental Concerns: other (see comments) (concerned that his MA was inactive)  # Asthma: noted on problem list        Plan:  -Serial neuro exams  -Pain control with medrol Dosepak, tylenol, torodol, ibuprofen  -Consider pain management consult  -MRI T spine completed  -OK for diet  -Hold off all AC/AP  -Ok for SQH  -Surgical intervention is not recommended, recommend conservative treatment with pain management team outpatient for consideration of epidural steroid injections, smoking cessation, and weight loss.    -Neurosurgery will follow peripherally, please do not hesitate to call with questions/concerns   -----------------------------------  BROCK Luciano, CNP  Department of Neurosurgery    -----------------------------------    Gen: Appears comfortable, NAD  Neurologic:  Alert & Oriented to person, place, time, and situation  Follows commands briskly  Speech fluent, spontaneous. No aphasia or dysarthria.  No gaze preference. No apparent hemineglect.  PERRL, EOMI  Face  symmetric with sensation intact to light touch  Palate elevates symmetrically, uvula midline, tongue protrudes midline  Trapezii muscles 5/5 bilaterally  No pronator drift     Del Tr Bi WE WF Gr   R 5 5 5 5 5 5   L 5 5 5 5 5 5    HF KE KF DF PF EHL   R 5 5 5 0 0 0   L 5 5 5 5 5 5   *Right foot amputation     Reflexes 2+ throughout    Numbness in BLE, more on the outer aspect, left worse than right. Numbness in the saddle region, more on the left    Objective:   Temp:  [97.9  F (36.6  C)-98.6  F (37  C)] 98.6  F (37  C)  Pulse:  [77-94] 77  Resp:  [16-18] 16  BP: (133-159)/() 134/91  SpO2:  [95 %-99 %] 95 %  I/O last 3 completed shifts:  In: -   Out: 1200 [Urine:1200]        LABS:  Recent Labs   Lab 09/29/24  0556 09/28/24  0621 09/27/24  1206    134* 141   POTASSIUM 4.2 4.1 3.8   CHLORIDE 105 103 108*   CO2 21* 21* 21*   ANIONGAP 10 10 12   * 173* 189*   BUN 15.6 11.7 11.3   CR 0.75 0.73 0.89   LISA 8.8 9.0 9.3       Recent Labs   Lab 09/29/24  0556   WBC 10.0   RBC 5.60   HGB 14.4   HCT 43.4   MCV 78   MCH 25.7*   MCHC 33.2   RDW 12.8          IMAGING:  No results found for this or any previous visit (from the past 24 hour(s)).      Please contact neurosurgery resident on call with questions.    Dial * * [phone], enter [phone] when prompted.

## 2024-09-30 NOTE — PLAN OF CARE
Admitted/transferred from: ED  2 RN full   skin assessment completed by Benson Pereira, RN and Brittney MENESES RN.  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

## 2024-10-01 ENCOUNTER — APPOINTMENT (OUTPATIENT)
Dept: ULTRASOUND IMAGING | Facility: CLINIC | Age: 29
DRG: 552 | End: 2024-10-01
Attending: HOSPITALIST
Payer: COMMERCIAL

## 2024-10-01 ENCOUNTER — APPOINTMENT (OUTPATIENT)
Dept: PHYSICAL THERAPY | Facility: CLINIC | Age: 29
DRG: 552 | End: 2024-10-01
Attending: INTERNAL MEDICINE
Payer: COMMERCIAL

## 2024-10-01 VITALS
WEIGHT: 315 LBS | BODY MASS INDEX: 42.66 KG/M2 | HEIGHT: 72 IN | TEMPERATURE: 98.3 F | DIASTOLIC BLOOD PRESSURE: 97 MMHG | HEART RATE: 66 BPM | RESPIRATION RATE: 16 BRPM | OXYGEN SATURATION: 95 % | SYSTOLIC BLOOD PRESSURE: 136 MMHG

## 2024-10-01 PROBLEM — M54.16 LUMBAR RADICULOPATHY: Status: ACTIVE | Noted: 2024-10-01

## 2024-10-01 PROCEDURE — 99238 HOSP IP/OBS DSCHRG MGMT 30/<: CPT | Performed by: HOSPITALIST

## 2024-10-01 PROCEDURE — 250N000013 HC RX MED GY IP 250 OP 250 PS 637: Performed by: INTERNAL MEDICINE

## 2024-10-01 PROCEDURE — 250N000012 HC RX MED GY IP 250 OP 636 PS 637

## 2024-10-01 PROCEDURE — 76857 US EXAM PELVIC LIMITED: CPT

## 2024-10-01 PROCEDURE — 76857 US EXAM PELVIC LIMITED: CPT | Mod: 26 | Performed by: STUDENT IN AN ORGANIZED HEALTH CARE EDUCATION/TRAINING PROGRAM

## 2024-10-01 PROCEDURE — 250N000013 HC RX MED GY IP 250 OP 250 PS 637

## 2024-10-01 PROCEDURE — 97530 THERAPEUTIC ACTIVITIES: CPT | Mod: GP | Performed by: REHABILITATION PRACTITIONER

## 2024-10-01 PROCEDURE — 97161 PT EVAL LOW COMPLEX 20 MIN: CPT | Mod: GP | Performed by: REHABILITATION PRACTITIONER

## 2024-10-01 RX ORDER — POLYETHYLENE GLYCOL 3350 17 G/17G
17 POWDER, FOR SOLUTION ORAL DAILY
COMMUNITY
Start: 2024-10-01

## 2024-10-01 RX ORDER — BUPROPION HYDROCHLORIDE 150 MG/1
TABLET, EXTENDED RELEASE ORAL
Qty: 63 TABLET | Refills: 2 | Status: SHIPPED | OUTPATIENT
Start: 2024-10-02 | End: 2024-11-04

## 2024-10-01 RX ORDER — METHYLPREDNISOLONE 4 MG/1
4 TABLET ORAL 2 TIMES DAILY
Qty: 6 TABLET | Refills: 0 | Status: SHIPPED | OUTPATIENT
Start: 2024-10-01 | End: 2024-10-01

## 2024-10-01 RX ORDER — METHYLPREDNISOLONE 4 MG/1
4 TABLET ORAL 2 TIMES DAILY
Qty: 6 TABLET | Refills: 0 | Status: SHIPPED | OUTPATIENT
Start: 2024-10-01

## 2024-10-01 RX ORDER — OXYCODONE HYDROCHLORIDE 10 MG/1
10 TABLET ORAL EVERY 4 HOURS PRN
Qty: 20 TABLET | Refills: 0 | Status: SHIPPED | OUTPATIENT
Start: 2024-10-01 | End: 2024-10-01

## 2024-10-01 RX ORDER — OXYCODONE HYDROCHLORIDE 10 MG/1
10 TABLET ORAL EVERY 4 HOURS PRN
Qty: 20 TABLET | Refills: 0 | Status: SHIPPED | OUTPATIENT
Start: 2024-10-01

## 2024-10-01 RX ORDER — IBUPROFEN 800 MG/1
800 TABLET, FILM COATED ORAL EVERY 6 HOURS PRN
COMMUNITY
Start: 2024-10-01

## 2024-10-01 RX ORDER — PREGABALIN 50 MG/1
50 CAPSULE ORAL 3 TIMES DAILY
Qty: 90 CAPSULE | Refills: 0 | Status: SHIPPED | OUTPATIENT
Start: 2024-10-01 | End: 2024-10-01

## 2024-10-01 RX ORDER — ACETAMINOPHEN 325 MG/1
975 TABLET ORAL 3 TIMES DAILY
COMMUNITY
Start: 2024-10-01

## 2024-10-01 RX ORDER — ACETAMINOPHEN 325 MG/1
975 TABLET ORAL 3 TIMES DAILY
COMMUNITY
Start: 2024-10-01 | End: 2024-10-01

## 2024-10-01 RX ORDER — POLYETHYLENE GLYCOL 3350 17 G/17G
17 POWDER, FOR SOLUTION ORAL DAILY
COMMUNITY
Start: 2024-10-01 | End: 2024-10-01

## 2024-10-01 RX ORDER — SENNA AND DOCUSATE SODIUM 50; 8.6 MG/1; MG/1
1 TABLET, FILM COATED ORAL 2 TIMES DAILY PRN
Qty: 30 TABLET | Refills: 3 | Status: SHIPPED | OUTPATIENT
Start: 2024-10-01 | End: 2024-10-01

## 2024-10-01 RX ORDER — BUPROPION HYDROCHLORIDE 150 MG/1
TABLET, EXTENDED RELEASE ORAL
Qty: 63 TABLET | Refills: 2 | Status: SHIPPED | OUTPATIENT
Start: 2024-10-02 | End: 2024-10-01

## 2024-10-01 RX ORDER — PREGABALIN 50 MG/1
50 CAPSULE ORAL 3 TIMES DAILY
Qty: 90 CAPSULE | Refills: 0 | Status: SHIPPED | OUTPATIENT
Start: 2024-10-01

## 2024-10-01 RX ORDER — SENNA AND DOCUSATE SODIUM 50; 8.6 MG/1; MG/1
1 TABLET, FILM COATED ORAL 2 TIMES DAILY PRN
Qty: 30 TABLET | Refills: 3 | Status: SHIPPED | OUTPATIENT
Start: 2024-10-01

## 2024-10-01 RX ADMIN — Medication 5 MG: at 00:48

## 2024-10-01 RX ADMIN — BUPROPION HYDROCHLORIDE 150 MG: 150 TABLET, FILM COATED, EXTENDED RELEASE ORAL at 07:51

## 2024-10-01 RX ADMIN — LACTULOSE 20 G: 20 SOLUTION ORAL at 07:52

## 2024-10-01 RX ADMIN — DIAZEPAM 5 MG: 5 TABLET ORAL at 07:52

## 2024-10-01 RX ADMIN — ACETAMINOPHEN 975 MG: 325 TABLET ORAL at 13:39

## 2024-10-01 RX ADMIN — ACETAMINOPHEN 975 MG: 325 TABLET ORAL at 07:08

## 2024-10-01 RX ADMIN — DIAZEPAM 5 MG: 5 TABLET ORAL at 00:43

## 2024-10-01 RX ADMIN — PREGABALIN 50 MG: 50 CAPSULE ORAL at 07:52

## 2024-10-01 RX ADMIN — OXYCODONE HYDROCHLORIDE 10 MG: 10 TABLET ORAL at 03:24

## 2024-10-01 RX ADMIN — POLYETHYLENE GLYCOL 3350 17 G: 17 POWDER, FOR SOLUTION ORAL at 07:52

## 2024-10-01 RX ADMIN — METHYLPREDNISOLONE 4 MG: 4 TABLET ORAL at 07:51

## 2024-10-01 RX ADMIN — HYDROMORPHONE HYDROCHLORIDE 2 MG: 2 TABLET ORAL at 06:19

## 2024-10-01 RX ADMIN — HYDROMORPHONE HYDROCHLORIDE 2 MG: 2 TABLET ORAL at 02:17

## 2024-10-01 RX ADMIN — HYDROMORPHONE HYDROCHLORIDE 2 MG: 2 TABLET ORAL at 12:17

## 2024-10-01 RX ADMIN — PREGABALIN 50 MG: 50 CAPSULE ORAL at 13:38

## 2024-10-01 ASSESSMENT — ACTIVITIES OF DAILY LIVING (ADL)
ADLS_ACUITY_SCORE: 24
ADLS_ACUITY_SCORE: 22
ADLS_ACUITY_SCORE: 24
ADLS_ACUITY_SCORE: 22
ADLS_ACUITY_SCORE: 24

## 2024-10-01 NOTE — DISCHARGE SUMMARY
[facility]  Hospitalist Discharge Summary      Date of Admission:  9/27/2024  Date of Discharge:  10/1/2024  Discharging Provider: Remigio Cortes MD  Discharge Service: Hospitalist Service, GOLD TEAM 7    Chief Complaint/Reason for admission:  Lumbar radiculopathy       Present on Admission:   Urinary retention   Bilateral leg numbness   Chronic midline low back pain with bilateral sciatica   Urinary incontinence, unspecified type   Lumbar radiculopathy      Discharge Diagnoses  Active Problems:    Urinary retention    Bilateral leg numbness    Chronic midline low back pain with bilateral sciatica    Urinary incontinence, unspecified type    Lumbar radiculopathy        Clinically Significant Risk Factors     # Severe Obesity: Estimated body mass index is 44.76 kg/m  as calculated from the following:    Height as of this encounter: 1.829 m (6').    Weight as of this encounter: 149.7 kg (330 lb).       Follow-ups Needed After Discharge   Follow-up Appointments     Adult [facility] Follow-up and recommended labs and tests      Follow up with primary care provider, Physician No Ref-Primary, within 7   days for hospital follow- up.  No follow up labs or test are needed.      Appointments on [place] and/or [facility] (with [facility] or [facility]   provider or service). Call [phone] if you haven't heard regarding   these appointments within 7 days of discharge.    Please follow up with Neurosurgery, Pain clinic and outpatient physical   therapy            Unresulted Labs Ordered in the Past 30 Days of this Admission       No orders found from 8/28/2024 to 9/28/2024.        These results will be followed up by     Discharge Disposition   Discharged to home  Condition at discharge: Stable    Hospital Course   Mario Calderon is a 29 yo M with pmhx of R sided sciatica, s/p R foot amputation 2016, HTN, ADHD admitted on 9/27/2024 due to c/f cauda equina vs other acute  neurologic disorder as pt woke up on 9/27/24 w/ sudden onset of BL LE numbness and radicular pain, loss of bladder control, saddle anesthesia. He has been followed closely by NSGY and has undergone MRIs of his spine, remains admitted for close monitoring and continued NSGY assistance.      Severe Thoracic, Lumbar  spinal and foraminal stenosis  BL LE paresthesias, subjective weakness, radicular pain  Saddle anesthesia  Bladder incontinence  C/f urinary retention  H/o R sided sciatica  P/w sudden onset of BL LE numbness/ radicular pain/ subjective weakness, saddle anesthesia, loss of bladder control on morning of 9/27. Does have h/o R sided sciatica w/ pain down his R leg, but has never had BL symptoms and has never had this severity. Saw a chiropractor on 9/25 for sciatic pain-- no pain immediately after the appointment, but now w/ this presentation. Lumbar MRI demonstrated severe canal stenosis at L3-4/L4-5 w/ moderate stenosis at L5-S1; moderate R neural foraminal stenosis at L4-5 and severe L neural foraminal stenosis at L5-S1. Thoracic MRI w/ mild to moderate spinal canal stenosis at T11-L1, Presentation is suspicious for cauda equina, conus medullaris syndrome but no myelopathy on MRI imaging. Neurosurgery evaluation x 2 - recommendations are for a spinal fusion, after Mario has quit smoking for 3 mo and lost weight in the outpatient setting. No clear signs of NICKERSON  Dispo: Outpatient follow up with Neurosurgery service and outpatient PT     # Pain control: Scheduled tylenol, oxycodone 10mg Q4H PRN, dilaudid 0.4mg Q3H PRN.  Stopped gabapentin and will trial lyrica (tried higher doses of gabapentin in past for phantom limb pain without improvement, so changed to Lyrica).  Can titrate up based on response              -- Adjust as needed  - s/p Medrol dose mary  - Q4h neuro checks   - PRN bladder scan and straight cath  - PRN hydralazine for SBP > 140 x2  Dispo: Charged with a short course of steroids; follow up  "with pain service as outpatient      Hyponatremia - mild: resolved with IVF    H/o R foot amputation: Has prosthetic.   HTN: Not noted to be on PTA meds on admission. PRN hydralazine as above.     Tobacco dependence:  Goal is cessation.  Discussed options, will initiate wellbutrin SR, 150 mg daily x 3 days, and then increase to BID for 12 weeks.         Diet:regular diet  DVT Prophylaxis: Pneumatic Compression Devices  [place] Catheter: Not present  Lines: None     Cardiac Monitoring: None  Code Status: Full Code      Consultations This Hospital Stay   NEUROSURGERY ADULT IP CONSULT  CARE MANAGEMENT / SOCIAL WORK IP CONSULT  PHYSICAL THERAPY ADULT IP CONSULT  PHARMACY LIAISON FOR MEDICATION COVERAGE CONSULT    Code Status   Full Code    Time Spent on this Encounter   IRemigio MD, personally saw the patient today and spent less than or equal to 30 minutes discharging this patient.       Remigio Cortes MD  [facility] [address] [address]  Phone: [phone]  ______________________________________________________________________    Physical Exam   Vital Signs: Temp: 98.3  F (36.8  C) Temp src: Oral BP: (!) 136/97 Pulse: 66   Resp: 16 SpO2: 95 % O2 Device: None (Room air)    Weight: 330 lbs 0 oz    Gen: NAD  HEENT: EOMI, PERRL, MMM  CV: extremities warm and well perfused  Resp: breathing comfortably on RA  : deferred  Msk: no LE edema  Skin: no rashes  Musculoskeletal: moving all extremities voluntarily during exam, no LE edema   Neuro: awake, alert, answering all questions appropriately during exam, sensation intact to b/l LE though on LLE pt reports touch feels like \"pins and needles\" pt does have 5/5 strength to b/l LE. Difficult to feel light tough in groin area           Primary Care Physician   Physician No Ref-Primary    Discharge Orders      Physical Therapy  Referral      Physical Therapy  Referral      Pain Management  " Referral      Primary Care Referral      Adult Neurosurgery  Referral      Reason for your hospital stay    You were admitted for severe lumbar spinal stenosis     Activity    Your activity upon discharge: activity as tolerated     Adult [facility] Follow-up and recommended labs and tests    Follow up with primary care provider, Physician No Ref-Primary, within 7 days for hospital follow- up.  No follow up labs or test are needed.      Appointments on [place] and/or [facility] (with [facility] or [facility] provider or service). Call [phone] if you haven't heard regarding these appointments within 7 days of discharge.    Please follow up with Neurosurgery, Pain clinic and outpatient physical therapy     Diet    Follow this diet upon discharge: Current Diet:Orders Placed This Encounter      Regular Diet Adult       Significant Results and Procedures   Most Recent 3 CBC's:  Recent Labs   Lab Test 09/29/24  0556 09/28/24  0621 09/27/24  1206   WBC 10.0 8.2 7.9   HGB 14.4 13.9 14.8   MCV 78 78 80    334 349     Most Recent 3 BMP's:  Recent Labs   Lab Test 09/29/24  0556 09/28/24  0621 09/27/24  1206    134* 141   POTASSIUM 4.2 4.1 3.8   CHLORIDE 105 103 108*   CO2 21* 21* 21*   BUN 15.6 11.7 11.3   CR 0.75 0.73 0.89   ANIONGAP 10 10 12   LISA 8.8 9.0 9.3   * 173* 189*     Most Recent 2 LFT's:  Recent Labs   Lab Test 09/27/24  1206   AST 31   ALT 45   ALKPHOS 69   BILITOTAL 0.4     Most Recent 3 INR's:  Recent Labs   Lab Test 09/27/24  1206   INR 1.02   ,   Results for orders placed or performed during the hospital encounter of 09/27/24   MR Lumbar Spine w/o Contrast    Narrative    MR LUMBAR SPINE W/O CONTRAST 9/27/2024 4:47 PM    Provided History: low back pain with bilateral leg numbness with  saddle numbness and urinary incontinence.    Comparison: CT same day    Technique: Sagittal T1-weighted, sagittal STIR, sagittal  diffusion-weighted (with ADC map), axial T1-weighted, and  3D  volumetric axial, coronal, and sagittal reconstructed T2-weighted  images of the lumbar spine were obtained without intravenous contrast.      Findings: There are 5 lumbar-type vertebrae with partial sacralization  of L5.  The tip of the conus medullaris is at L1.  Minimal stepwise  grade 1 retrolisthesis at L3-4 and L4-5, unchanged. Mild to moderate  disc height narrowing from L3-S1.  Degenerative edema in the opposing  endplates at L4-5.    On a level by level basis:    T12-L1: No spinal canal or neuroforaminal stenosis.    L1-2: No spinal canal or neuroforaminal stenosis.    L2-3: Small posterior disc osteophyte complex results in mild spinal  canal stenosis. Mild neural foraminal narrowing bilaterally.    L3-4: Large centrally extruded posterior disc osteophyte complex  resulting in severe spinal canal stenosis. Mild left neural narrowing.  No right neural foraminal stenosis. Facet hypertrophy bilaterally.    L4-5: Large centrally extruded posterior disc osteophyte complex  resulting in moderate to severe spinal canal stenosis. Moderate right  and mild left neural foraminal narrowing. Facet hypertrophy  bilaterally.    L5-S1: Moderate posterior disc osteophyte complex contributing to  moderate spinal canal stenosis. Asymmetric left greater than right  facet hypertrophy with heterotopic bone formation resulting in severe  left neural foraminal stenosis. Mild neuroforaminal narrowing on the  right.    Paraspinous tissues are within normal limits.      Impression    Impression:   1. Redemonstration of the large extruded posterior disc osteophyte  complexes at L3-4 and L4-5 with resultant severe spinal canal  stenosis. Also moderate spinal canal stenoses at L5-S1 as a result of  a disc osteophyte complex.  2. Moderate right neural foraminal stenosis at L4-5 and severe left  neural foraminal stenosis at L5-S1.      I have personally reviewed the examination and initial interpretation  and I agree with the  findings.    LANIE LEDBETTER MD         SYSTEM ID:  [id number]   Thoracic spine MRI w/o contrast    Narrative    MR THORACIC SPINE W/O CONTRAST 9/27/2024 5:17 PM    Provided History: sudden bilateral leg numbness and saddle anesthesia  with urinary incontinence and retention    Comparison: Same day CT    Technique: Sagittal T1-weighted, sagittal T2-weighted, sagittal STIR,  and axial T2-weighted images of the thoracic spine were obtained  without the administration of intravenous contrast. The entire T2  axial sequence was not completed as patient was in too much pain to  finish the exam.    Findings:  T2 axial sequences only performed to the level of T6 due to patient  pain. The thoracic vertebral column is in normal alignment. Mild loss  of disc height at T4-5 and T11-12. Small posterior disc bulges and  protrusions from T4-5 through T8-9 along with endplate osteophytosis  with multilevel mild spinal canal narrowing. Moderate posterior disc  osteophyte complex at T11-12 results in mild to moderate spinal canal  stenosis and mild left neuroforaminal narrowing. Posterior disc  osteophyte complex at T12-L1 results in mild spinal canal stenosis and  mild right neural foraminal narrowing. No abnormal signal in the  thoracic spinal cord, to the extent visualized.        Impression    Impression:   Multilevel posterior disc osteophyte complexes, most pronounced at  T11-12 with mild to moderate spinal canal stenosis and at T12-L1 with  mild spinal canal stenosis. No high-grade neural foraminal stenosis.    I have personally reviewed the examination and initial interpretation  and I agree with the findings.    LANIE LEDBETTER MD         SYSTEM ID:  [id number]   CT Lumbar Spine w/o Contrast    Narrative    Thoracic and Lumbar spine CT without contrast    History: acute back pain.    Comparison: None    Technique: Axial, coronal, and sagittal multiplanar reconstructions  obtained from acquisition of thoracic and lumbar spine CT  scan without  contrast.    Findings:  Thoracic spine: There is no acute fracture or subluxation. There is no  prevertebral edema. There is no high-grade spinal canal stenosis at  any level. Disc osteophyte complexes resulting in mild spinal canal  narrowing at the T7-8 and T8-9 levels. Disc osteophyte complex at the  T11-12 space results in mild-to-moderate left neural foraminal  narrowing and probable mild spinal canal narrowing. Small disc  osteophyte complex at the T12-L1 level results in mild right neural  foraminal and mild spinal canal narrowing.No soft tissue abnormality  in the visualized paraspinous tissues anteriorly.    Lumbar Spine: There is no acute fracture or subluxation. There are 4  lumbar type vertebra,, with partial sacralization of the L5 vertebral  segment.   Multilevel disc space narrowing is greatest at the L3-4, L4-5 and  L5-S1 levels. There is ankylosis of the posterior elements of L5 and  S1 on the left.    On a level by level basis, the findings are as follows:    L1-2:  There is no focal abnormality.  L2-3:  Disc osteophyte complex with mild spinal canal stenosis. Mild  right neural foraminal narrowing. No significant left neural foraminal  narrowing.  L3-4:  Large disc osteophyte complex resulting in severe spinal canal  stenosis. Mild to moderate right and mild left left neural foraminal  narrowing. Mild facet arthropathy.  L4-5:  Large disc osteophyte complex resulting in severe spinal canal  stenosis. Moderate right and mild left neural foraminal narrowing.  L5-S1:  Large disc osteophyte complex resulting in moderate severe  spinal canal stenosis. Mild right neural foraminal narrowing. Severe  left neural foraminal narrowing secondary to heterotopic bone  formation and severe facet arthrosis. Partially sacralized left L5  segment.    The visualized paraspinous tissues anteriorly are unremarkable.      Impression    Impression:   1. Large disc osteophyte complexes at the L3-4 and L4-5  levels result  in severe spinal canal stenosis. Additionally, there is moderate to  severe narrowing at the L5-S1 level secondary to a disc osteophyte  complex.  2. Multilevel neural foraminal narrowing is greatest at the left L5-S1  neural foramina where there is severe narrowing secondary to severe  facet arthropathy, heterotopic bone formation and a partially  sacralized left L5 segment.  3. Mild thoracic spondylosis, as detailed above, without high-grade  spinal canal or neural foraminal narrowing.    I have personally reviewed the examination and initial interpretation  and I agree with the findings.    REGI SEWELL MD         SYSTEM ID:  [id number]   CT Thoracic Spine w/o Contrast    Narrative    Thoracic and Lumbar spine CT without contrast    History: acute back pain.    Comparison: None    Technique: Axial, coronal, and sagittal multiplanar reconstructions  obtained from acquisition of thoracic and lumbar spine CT scan without  contrast.    Findings:  Thoracic spine: There is no acute fracture or subluxation. There is no  prevertebral edema. There is no high-grade spinal canal stenosis at  any level. Disc osteophyte complexes resulting in mild spinal canal  narrowing at the T7-8 and T8-9 levels. Disc osteophyte complex at the  T11-12 space results in mild-to-moderate left neural foraminal  narrowing and probable mild spinal canal narrowing. Small disc  osteophyte complex at the T12-L1 level results in mild right neural  foraminal and mild spinal canal narrowing.No soft tissue abnormality  in the visualized paraspinous tissues anteriorly.    Lumbar Spine: There is no acute fracture or subluxation. There are 4  lumbar type vertebra,, with partial sacralization of the L5 vertebral  segment.   Multilevel disc space narrowing is greatest at the L3-4, L4-5 and  L5-S1 levels. There is ankylosis of the posterior elements of L5 and  S1 on the left.    On a level by level basis, the findings are as  follows:    L1-2:  There is no focal abnormality.  L2-3:  Disc osteophyte complex with mild spinal canal stenosis. Mild  right neural foraminal narrowing. No significant left neural foraminal  narrowing.  L3-4:  Large disc osteophyte complex resulting in severe spinal canal  stenosis. Mild to moderate right and mild left left neural foraminal  narrowing. Mild facet arthropathy.  L4-5:  Large disc osteophyte complex resulting in severe spinal canal  stenosis. Moderate right and mild left neural foraminal narrowing.  L5-S1:  Large disc osteophyte complex resulting in moderate severe  spinal canal stenosis. Mild right neural foraminal narrowing. Severe  left neural foraminal narrowing secondary to heterotopic bone  formation and severe facet arthrosis. Partially sacralized left L5  segment.    The visualized paraspinous tissues anteriorly are unremarkable.      Impression    Impression:   1. Large disc osteophyte complexes at the L3-4 and L4-5 levels result  in severe spinal canal stenosis. Additionally, there is moderate to  severe narrowing at the L5-S1 level secondary to a disc osteophyte  complex.  2. Multilevel neural foraminal narrowing is greatest at the left L5-S1  neural foramina where there is severe narrowing secondary to severe  facet arthropathy, heterotopic bone formation and a partially  sacralized left L5 segment.  3. Mild thoracic spondylosis, as detailed above, without high-grade  spinal canal or neural foraminal narrowing.    I have personally reviewed the examination and initial interpretation  and I agree with the findings.    REGI SEWELL MD         SYSTEM ID:  [id number]   MR Thoracic Spine w/o & w Contrast    Narrative    MR THORACIC SPINE W/O & W CONTRAST 9/28/2024 5:00 PM    Provided History: pt with bladder incontinence, low back pain, LE  numbess, did not finish recent thoracic spine MRI,NSGY needing for  management plan  ICD-10:    Comparison: MRI thoracic spine  9/27/2024.    Technique:  Sagittal T1-weighted, sagittal T2-weighted, sagittal STIR, sagittal  diffusion weighted, and axial T2-weighted images through the thoracic  spine were obtained without intravenous contrast. Following  intravenous administration of gadolinium, axial and sagittal  T1-weighted images with fat saturation were also obtained.    Contrast: IV Gadavist    Findings:  Normal thoracic vertebral alignment.  And axial T2-weighted images are  degraded by patient motion, however there is no definite cord signal  abnormality. Mild loss of disc height at C4-5 and T11-12. Multiple  small posterior disc bulges and protrusions from T4-T9 and T11-L1  contributing to multilevel mild to moderate spinal canal stenosis.  Largest disc protrusion at T11-12 in the left subarticular zone.  Multilevel thoracic facet hypertrophy. Mild neural foraminal narrowing  on the left at T10-11 and T11-12.    No abnormal contrast enhancement within the thoracic spinal cord,  thecal sac or vertebral column.      Impression    Impression:  1. Multilevel thoracic spondylosis with mild to moderate spinal canal  stenosis, most pronounced at T11-12.  2. No definite myelopathic cord signal.    LANIE LEDBETTER MD         SYSTEM ID:  [id number]   US Bladder Only    Narrative    EXAMINATION: US BLADDER ONLY, 10/1/2024 3:34 PM     COMPARISON: None.    HISTORY: Suspect bladder outlet obstruction    FINDINGS: The bladder is well distended and measures 7.3 x 5.5 x 6.8  cm with 140 mL volume. There is no wall thickening or trabeculation.      Impression    IMPRESSION:  1.  Normal appearing bladder with 140 mL volume.    I have personally reviewed the examination and initial interpretation  and I agree with the findings.    COMFORT AMOS DO         SYSTEM ID:  [id number]       Discharge Medications   Current Discharge Medication List        START taking these medications    Details   acetaminophen (TYLENOL) 325 MG tablet Take 3 tablets (975 mg) by  mouth 3 times daily.    Associated Diagnoses: Lumbar radiculopathy      buPROPion (WELLBUTRIN SR) 150 MG 12 hr tablet Take 1 tablet (150 mg) by mouth daily for 3 days, THEN 1 tablet (150 mg) 2 times daily.  Qty: 63 tablet, Refills: 2    Associated Diagnoses: Lumbar radiculopathy      methylPREDNISolone (MEDROL) 4 MG tablet Take 1 tablet (4 mg) by mouth 2 times daily.  Qty: 6 tablet, Refills: 0    Associated Diagnoses: Lumbar radiculopathy      oxyCODONE IR (ROXICODONE) 10 MG tablet Take 1 tablet (10 mg) by mouth every 4 hours as needed for severe pain.  Qty: 20 tablet, Refills: 0    Associated Diagnoses: Lumbar radiculopathy      polyethylene glycol (MIRALAX) 17 GM/Dose powder Take 17 g by mouth daily.    Associated Diagnoses: Lumbar radiculopathy      pregabalin (LYRICA) 50 MG capsule Take 1 capsule (50 mg) by mouth 3 times daily.  Qty: 90 capsule, Refills: 0    Associated Diagnoses: Lumbar radiculopathy      SENNA-docusate sodium (SENNA S) 8.6-50 MG tablet Take 1 tablet by mouth 2 times daily as needed (Constipation).  Qty: 30 tablet, Refills: 3    Associated Diagnoses: Lumbar radiculopathy           CONTINUE these medications which have CHANGED    Details   ibuprofen (ADVIL/MOTRIN) 800 MG tablet Take 1 tablet (800 mg) by mouth every 6 hours as needed for moderate pain.    Associated Diagnoses: Lumbar radiculopathy           Allergies   No Known Allergies

## 2024-10-01 NOTE — PROGRESS NOTES
Discharge time/date: 1745 -10/01/24  Walked or Wheelchair: Wheelchair  PIV removed: Yes  Reviewed AVS with patient: Yes  Medication due times added to AVS in EPIC: Yes  Verbalized understanding of discharge with teachback: Yes  Medications retrieved from pharmacy: N/A - script sent to [facility] per pt request. Provider entered order prior to discharge.  Supplies sent home: N/A  Belongings from security with patient: N/A

## 2024-10-01 NOTE — PLAN OF CARE
Status: Pt admitted for worsening low back pain, bilateral radicular pain, numbness in bilateral lower extremities.   Vitals: VSS on RA  Neuros: A&Ox4, Numbness to bilateral buttocks and down entire L posterior leg to bottom of L. foot unchanged   IV: L. PIV SL   Resp/trach: on RA  Diet: Regular diet  Bowel status: LBM 9/24, taking scheduled lactulose and miralax, PRN senna given yesterday radha.   : Voiding via urinal  Skin: No new deficits   Pain: 7-8/10 back and Bilateral leg pain managed with PRN valium, PO dilaudid, and oxycodone   Activity: Up ad ind. R Prosthetic/ wheelchair in room   Plan: Surgical intervention not recommended at this time per NSG, outpatient conservative pain management recommended. Medically Ready for Discharge: Anticipated in 2-4 Days to home. Continue with POC     Goal Outcome Evaluation:      Plan of Care Reviewed With: patient    Overall Patient Progress: no change    Outcome Evaluation: No BM since arrival, LBM 9/24. Anticipated d/c to home in 2-4 days.

## 2024-10-01 NOTE — PROGRESS NOTES
Brief Neurosurgery Progress Note    Primary team reached out with questions regarding the rationale for the previously documented plan per neurosurgery.    Patient's imaging was extensively reviewed by our spine staff, considering both the clinical picture and imaging findings.    Surgical Considerations:  - At least a three-level TLIF is the procedure of choice to address the multilevel spinal involvement. Patient's current BMI and smoking history classify him as a poor surgical candidate, with a significant risk of hardware failure and complications, including the need for repeat procedures.  - A simple discectomy was also considered; however, his discs at the affected levels are large and calcified, posing a high risk of intra-operative dural tear leading to cerebrospinal fluid (CSF) leak and potential injury to nerve roots.    Patient reports numbness in the perineum, however, he has a normal post-void residual (PVR), and full strength in all extremities, findings which are somewhat reassuring.    Recommendations:  1. Smoking Cessation  2. Weight loss  3. Conservative Therapies: Pursue outpatient PM&R referral for consideration of epidural steroid injections.    Oliva Quiñones MD, PGY-1  Department of Neurosurgery  Pager: [phone]    Please contact neurosurgery resident on call with questions.    Dial * * [phone], enter [phone] when prompted.

## 2024-10-01 NOTE — PLAN OF CARE
Goal Outcome Evaluation:      Plan of Care Reviewed With: patient    Overall Patient Progress: no changeOverall Patient Progress: no change    Outcome Evaluation: Worked with therapies    Status: Presenting with worsening low back pain, bilateral radicular pain, numbness in bilateral lower extremities   Vitals: VSS  Neuros: Numbness to bilateral buttocks down L leg.   IV: PIV-Sl'd   Labs/Electrolytes:   Resp/trach: RA   Diet: Regular; fair intake   Bowel status: BM x2 this shift   : Strains to void; voiding adequately in urinal. PVR after void   Skin: Intact   Pain: Back pain managed with  PO dilaudid, and Valium   Activity: Up ad isabelle. R Leg prosthetic.    Plan: No neurosurgical intervention at this time. Outpatient conservative pain management recommended.   Updates this shift: PVR after void. Bladder ultrasound completed. Worked with PT. PT recommends outpatient at discharge.

## 2024-10-01 NOTE — PLAN OF CARE
Status: Pt admitted for worsening low back pain, bilateral radicular pain, numbness in bilateral lower extremities.   Vitals: VSS on RA  Neuros: A&Ox4, Numbness to bilateral buttocks down L leg  IV: PIV SL   Resp/trach: on RA  Diet: Regular diet  Bowel status: LBM PTA. Scheduled Lactulose and PRN Senna given   : Voiding via urinal  Skin: No new deficits   Pain: 7-8/10 back and Bilateral leg pain managed with PRN Oxy, dilaudid, valium and scheduled meds   Activity: Up ad ind. R Prosthetic/ wheelchair in room   Plan: Surgical intervention not recommended at this time per NSG, outpatient conservative pain management recommended, continue with POC

## 2024-10-01 NOTE — PROGRESS NOTES
[facility]    Medicine Progress Note - Hospitalist Service, GOLD TEAM 7    Date of Admission:  9/27/2024    Assessment & Plan   Mario Calderon is a 29 yo M with pmhx of R sided sciatica, s/p R foot amputation 2016, HTN, ADHD admitted on 9/27/2024 due to c/f cauda equina vs other acute neurologic disorder as pt woke up on 9/27/24 w/ sudden onset of BL LE numbness and radicular pain, loss of bladder control, saddle anesthesia. He has been followed closely by NSGY and has undergone MRIs of his spine, remains admitted for close monitoring and continued NSGY assistance.      Changes:  Continues to report issues with bowel and bladder controlTobacco cessation suggestions, see plan  Pain: see plan  PT: ordered.  Re-discussed plan with neurosurgery, awaiting updates.    Severe Thoracic, Lumbar  spinal and foraminal stenosis  BL LE paresthesias, subjective weakness, radicular pain  Saddle anesthesia  Bladder incontinence  C/f urinary retention  H/o R sided sciatica  P/w sudden onset of BL LE numbness/ radicular pain/ subjective weakness, saddle anesthesia, loss of bladder control on morning of 9/27. Does have h/o R sided sciatica w/ pain down his R leg, but has never had BL symptoms and has never had this severity. Saw a chiropractor on 9/25 for sciatic pain-- no pain immediately after the appointment, but now w/ this presentation. Lumbar MRI demonstrated severe canal stenosis at L3-4/L4-5 w/ moderate stenosis at L5-S1; moderate R neural foraminal stenosis at L4-5 and severe L neural foraminal stenosis at L5-S1. Thoracic MRI w/ mild to moderate spinal canal stenosis at T11-L1, Presentation is suspicious for cauda equina, conus medullaris syndrome but no myelopathy on MRI imaging. Neurosurgery evaluation x 2 - recommendations are for a spinal fusion, after Mario has quit smoking for 3 mo and lost weight. Will check bladder US to rule out any signs of NICKERSON and monitor bladder  scans  - NSGY consulted and following      # Pain control: Scheduled tylenol, oxycodone 10mg Q4H PRN, dilaudid 0.4mg Q3H PRN.  Stopped gabapentin and will trial lyrica (tried higher doses of gabapentin in past for phantom limb pain without improvement, so changed to Lyrica).  Can titrate up based on response              -- Adjust as needed  - s/p Medrol dose mary  - Q4h neuro checks   - PRN bladder scan and straight cath  - PRN hydralazine for SBP > 140 x2      Hyponatremia - mild: resolved with IVF    H/o R foot amputation: Has prosthetic.   HTN: Not noted to be on PTA meds on admission. PRN hydralazine as above.     Tobacco dependence:  Goal is cessation.  Discussed options, will initiate wellbutrin SR, 150 mg daily x 3 days, and then increase to BID for 12 weeks.         Diet:regular diet  DVT Prophylaxis: Pneumatic Compression Devices  Palacios Catheter: Not present  Lines: None     Cardiac Monitoring: None  Code Status: Full Code      Clinically Significant Risk Factors                  # Hypertension: Noted on problem list           # Severe Obesity: Estimated body mass index is 44.76 kg/m  as calculated from the following:    Height as of this encounter: 1.829 m (6').    Weight as of this encounter: 149.7 kg (330 lb)., PRESENT ON ADMISSION       # Financial/Environmental Concerns: other (see comments) (concerned that his MA was inactive)  # Asthma: noted on problem list        Disposition Plan     Medically Ready for Discharge: Anticipated Tomorrow             Remigio Cortes MD  Hospitalist Service, GOLD TEAM 7  [facility]  Securely message with [employer] (more info)  Text page via [employer] Paging/Directory   See signed in provider for up to date coverage information  ______________________________________________________________________    Interval History     Continues to report perineal numbness and difficulty with incomplete bladder and bowel evacuation    Physical  "Exam   Vital Signs: Temp: 98  F (36.7  C) Temp src: Oral BP: (!) 124/100 Pulse: 66   Resp: 16 SpO2: 94 % O2 Device: None (Room air)    Weight: 330 lbs 0 oz    Gen: NAD  HEENT: EOMI, PERRL, MMM  CV: extremities warm and well perfused  Resp: breathing comfortably on RA  : deferred  Msk: no LE edema  Skin: no rashes  Musculoskeletal: moving all extremities voluntarily during exam, no LE edema   Neuro: awake, alert, answering all questions appropriately during exam, sensation intact to b/l LE though on LLE pt reports touch feels like \"pins and needles\" pt does have 5/5 strength to b/l LE. Difficult to feel light tough in groin area      "

## 2024-10-01 NOTE — PROGRESS NOTES
"   10/01/24 1034   Appointment Info   Signing Clinician's Name / Credentials (PT) Brittney Swain DPT   Rehab Comments (PT) RLE prosthetic   Living Environment   People in Home friend(s)   Current Living Arrangements house   Home Accessibility stairs to enter home   Number of Stairs, Main Entrance 1   Transportation Anticipated car, drives self;family or friend will provide   Living Environment Comments Pt reports he just moved last weekend into a friends home. Pt reports he will have a assistance of his friend and his friends Mom at discharge. 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   General Information   Onset of Illness/Injury or Date of Surgery 09/27/24   Referring Physician Kulwant Centeno MD   Pertinent History of Current Problem (include personal factors and/or comorbidities that impact the POC) Per chart review \"is a 27 yo M with pmhx of R sided sciatica, s/p R foot amputation 2016, HTN, ADHD admitted on 9/27/2024 due to c/f cauda equina vs other acute neurologic disorder as pt woke up on 9/27/24 w/ sudden onset of BL LE numbness and radicular pain, loss of bladder control, saddle anesthesia. He has been followed closely by NSGY and has undergone MRIs of his spine, remains admitted for close monitoring and continued NSGY assistance. \"   Existing Precautions/Restrictions fall;spinal   Cognition   Affect/Mental Status (Cognition) WNL   Orientation Status (Cognition) oriented x 4   Follows Commands " (Cognition) WNL   Pain Assessment   Patient Currently in Pain Yes, see Vital Sign flowsheet  (back pain, radiating down LLE)   Integumentary/Edema   Integumentary/Edema no deficits were identifed   Posture    Posture Forward head position;Protracted shoulders   Range of Motion (ROM)   Range of Motion ROM is WFL   Strength (Manual Muscle Testing)   Strength (Manual Muscle Testing) Deficits observed during functional mobility   Bed Mobility   Comment, (Bed Mobility) Pt tx supine->sit with SBA.   Transfers   Comment, (Transfers) Pt tx sit->stand with SBA.   Gait/Stairs (Locomotion)   Comment, (Gait/Stairs) Pt amb ~ 10 feet with SBA, pt reaching for furniture and grabbing bedside table.   Balance   Balance other (describe)   Sensory Examination   Sensory Perception Comments Pt reports sensory changes in LLE from hip to toes.   Clinical Impression   Criteria for Skilled Therapeutic Intervention Yes, treatment indicated   PT Diagnosis (PT) Impaired Functional Mobility   Influenced by the following impairments impaired sensation in LLE limiting mobility, activity intolerance, back pain   Functional limitations due to impairments bed mob, transfers, gait   Clinical Presentation (PT Evaluation Complexity) evolving   Clinical Presentation Rationale PMHx, clinical judgement, current presentation   Clinical Decision Making (Complexity) low complexity   Planned Therapy Interventions (PT) bed mobility training;gait training;neuromuscular re-education;stair training;strengthening;transfer training;wheelchair management/propulsion training;progressive activity/exercise;stretching   Risk & Benefits of therapy have been explained care plan/treatment goals reviewed   PT Total Evaluation Time   PT Marileeal, Low Complexity Minutes (23006) 9   Physical Therapy Goals   PT Frequency 3x/week   PT Predicted Duration/Target Date for Goal Attainment 10/08/24   PT Goals Bed Mobility;Transfers;Gait   PT: Bed Mobility Independent;Supine to/from  sit;Rolling;Within precautions   PT: Transfers Independent;Sit to/from stand;Bed to/from chair;Within precautions   PT: Gait Independent;Greater than 200 feet;Within precautions;Assistive device   PT Discharge Planning   PT Plan gait with knee scooter   PT Discharge Recommendation (DC Rec) home with assist;home with outpatient physical therapy;other (see comments)  (Recommend OP PT with spina specialist, may need Naheed exercises. Recommend seated and wheeled mobility clinic for w/c fitting. Pt utilizes w/c for long distance mobility, current w/c is 9  years old and seat is worn.)   PT Rationale for DC Rec Pts mobility is limited by back pain radiating down and impaired sensation in LLE after chiropractic adjustment. Pt has assistance at home of his friends. Pt has all necessary DME at home, utilizes knee scooter at baseline. Pt has manual w/c for long distance mobility. Recommend OP PT to address acute back pain, and OP Seated and Wheeled mobility clinic for w/c fitting. Pt has 9 year old w/c that has worn seat that needs repair. Pt in agreement with recommendations, feels confident regarding return home.   PT Brief overview of current status up with SBA   Total Session Time   Total Session Time (sum of timed and untimed services) 9

## 2024-10-02 ENCOUNTER — TELEPHONE (OUTPATIENT)
Dept: NEUROSURGERY | Facility: CLINIC | Age: 29
End: 2024-10-02
Payer: COMMERCIAL

## 2024-10-02 NOTE — PLAN OF CARE
Physical Therapy Discharge Summary    Reason for therapy discharge:    Discharged to home with outpatient therapy.    Progress towards therapy goal(s). See goals on Care Plan in [facility] electronic health record for goal details.  Goals partially met.  Barriers to achieving goals:   discharge from facility.    Therapy recommendation(s):    Continued therapy is recommended.  Rationale/Recommendations:  Pt will benefit from further PT to improve functional mobility.

## 2024-10-02 NOTE — TELEPHONE ENCOUNTER
[facility] Call Center    Phone Message    May a detailed message be left on voicemail: yes     Reason for Call: Other: Patient has referral for lumbar radiculopathy and answered 'YES' to red flag question leaking urine. Patient is currently scheduled on 10/4/24 with Dr. Barraza for consult      Action Taken: Message routed to:  [facility] Neurosurgery    Travel Screening: Not Applicable     Date of Service:

## 2024-10-02 NOTE — TELEPHONE ENCOUNTER
This RN called patient who states that he denies any new symptoms since discharge from hospital. Patient is to see Pain medicine and establish care with a PMD-referrals for both are within the system. Emphasized the importance of establishing this care-patient verbalizes understanding that his appointment on Friday with Dr. Barraza is cancelled.

## 2024-10-02 NOTE — TELEPHONE ENCOUNTER
SPINE PATIENTS - NEW PROTOCOL PREVISIT    RECORDS RECEIVED FROM: internal   REASON FOR VISIT: Spine-Lumbar radiculopathy- patient answered 'YES' to red flag question;    PROVIDER: Dr. Barraza   DATE OF APPT: 10/4/24   NOTES (FOR ALL VISITS) STATUS DETAILS   OFFICE NOTE from referring provider Internal SEE INPATIENT NOTES   DISCHARGE SUMMARY from hospital Internal [facility]:  9/27/24-10/1/24   MEDICATION LIST Internal    IMAGING  (FOR ALL VISITS)     MRI (HEAD, NECK, SPINE) [facility]:  MRI Thoracic Spine 9/28/24  MRI Thoracic Spine 9/27/24  MRI Lumbar Spine 9/27/24   CT (HEAD, NECK, SPINE) [facility]:  CT Lumbar Spine 9/27/24  CT Thoracic Spine 9/27/24

## 2024-10-04 ENCOUNTER — PRE VISIT (OUTPATIENT)
Dept: NEUROSURGERY | Facility: CLINIC | Age: 29
End: 2024-10-04

## 2024-10-05 ENCOUNTER — THERAPY VISIT (OUTPATIENT)
Dept: PHYSICAL THERAPY | Facility: CLINIC | Age: 29
End: 2024-10-05
Attending: HOSPITALIST
Payer: COMMERCIAL

## 2024-10-05 DIAGNOSIS — G89.29 CHRONIC MIDLINE LOW BACK PAIN WITH BILATERAL SCIATICA: ICD-10-CM

## 2024-10-05 DIAGNOSIS — M54.42 CHRONIC MIDLINE LOW BACK PAIN WITH BILATERAL SCIATICA: ICD-10-CM

## 2024-10-05 DIAGNOSIS — R20.0 BILATERAL LEG NUMBNESS: Primary | ICD-10-CM

## 2024-10-05 DIAGNOSIS — M54.41 CHRONIC MIDLINE LOW BACK PAIN WITH BILATERAL SCIATICA: ICD-10-CM

## 2024-10-05 PROCEDURE — 97163 PT EVAL HIGH COMPLEX 45 MIN: CPT | Mod: GP

## 2024-10-05 PROCEDURE — 97110 THERAPEUTIC EXERCISES: CPT | Mod: GP

## 2024-10-05 NOTE — PROGRESS NOTES
PHYSICAL THERAPY EVALUATION  Type of Visit: Evaluation        Fall Risk Screen:  Fall screen completed by: PT  Have you fallen 2 or more times in the past year?: No  Have you fallen and had an injury in the past year?: No  Is patient a fall risk?: No    Subjective       Presenting condition or subjective complaint: skyler said i need therepy  Date of onset: 09/27/24    Relevant medical history:     Dates & types of surgery: aug 18 2015 i lost my foot from a work accident. also had acl surgrey back in highschool    Prior diagnostic imaging/testing results: MRI; CT scan     Prior therapy history for the same diagnosis, illness or injury: No      Prior Level of Function  Transfers:   Ambulation:   ADL:   IADL:     Living Environment  Social support: With family members   Type of home: [facility]; 2-story   Stairs to enter the home: Yes       Ramp: No   Stairs inside the home: Yes   Is there a railing: Yes     Help at home: Self Cares (home health aide/personal care attendant, family, etc); Home management tasks (cooking, cleaning); Medication and/or finances; Assist for driving and community activities  Equipment owned: Standard wheelchair     Employment: Yes [employer]  Hobbies/Interests:      Patient goals for therapy: walk sit stand    PHYSICAL THERAPY LUMBAR EVALUATION    SUBJECTIVE:  Mario is a 28 year old male who reports that he was in the hospital, has had CT scan and MRI for his low back. He reports that his surgeons report they feel surgery is too risky so they recommend conservative care.  MRI shows Severe and significant L4-L5 osteophyte disc complex with severe spinal stenosis. He also reports a chronic history of sciatica nerve radicular pain that would last 6 months at a time.    He reports that last week he woke up Friday and his legs were numb.  Everything from his private parts to his feet bilaterally.  He called his roommate for help.  He couldn't control his bladder.  He reports no trauma has  occurred, but does state he did go to a Chiropractor two days prior. He felt good after the chiropractor so he doesn't think the chiropractor     In regards to pain, at night pain his whole left side gets pain and pressure down his leg. During the day when he lays down stretched out on his right side it feels good.  Walking really hurts.     Patient reports symptoms of:  Pain, Range of Motion Loss, Stiffness / Tightness, Numbness, Tingliness, Weakness, Loss of Sensation, Loss of Balance, and Gait Impairments    Patient report of Pain:  Pain Rating Now: Yes, but unrated  Pain Rating at Worst: 10/10  Pain Location: Low Back, Bilateral Hips, Down lower extremities to the feet.  Also occasionally spreads straight posterior leg in a L5/S1 dermatomal pattern  Pain Quality/Description: Numbness, Burning, Spreads down legs  Pain Better with: Rest, Sidelying laying stretched out on his right side  Pain Worse with: Standing, Any Spinal movement in any direction, pain at night, Walking  Progression of Symptoms: Improving / Unchanged    Patient reports Red Flags symptoms of:  Saddle Anesthesia  Incontinence  Bilateral Radicular Symptoms  Night Pain    OBJECTIVE:  Seated Lower Extremity Manual Muscle Test / Myotome Assessment:  Hip Flexion (L2): Right Leg 4/5, Left Leg 4/5  Knee Extension (L3): Right Leg 2/5, Left Leg 2/5  Ankle Dorsiflexion (L4): Left Leg 5/5  Great Toe (L5): Left Leg 5/5  Ankle Plantarflexion (S1): Unable to stand, so deferred at this time.  Knee Flexion (S2): Right Leg 3/5, Left Leg 3/5    Seated Neural Tension Assessment:   Slump Test: Right Leg: Positive with Just Partial Knee Extension, Left Leg: Positive with Just Partial Knee Extension    Standing Lumbar Active ROM:  Sitting Lumbar Flexion (Hands slide down thighs): Moderate Limitation  Sitting Lumbar Extension: Moderate Limitation     Naheed TAOT Repeated Movements Lumbar Assessment:  Repeated Lumbar Flexion in Sitting: Worse and Peripheralised down  the left leg with specifically more numbness and warm/hot sensation in the calf and feet  Repeated Lumbar Extension with Prone Lying: Worse and Peripheralized with more tingling in the left foot  Repeated Lumbar Extension with Prone on Elbows: Worse, Peripheralised, and signifcantly more painful in the foot  Repeated Lumbar Extension with Two Pillows hip for force regression since Prior two extension made symptoms worse: Worse and Peripheralized     Naheed TAOT Classification: Other / Inconclusive: Inconclusive, but I suspect that there is likely a derangement component at this time. However at the moment prone and supine are too aggressive with both causing peripheralization.  Will further assess.         ASSESSMENT:  Mario is a 28 year old male referred to Physical Therapy for Chronic Midline Low Back Pain with Bilateral Sciatica   from Remigio Cortes MD.  Mario demonstrates findings of Pain, Weakness, Motion Loss, Radicular Symptoms, Neurological Symptoms, Loss of Function, Balance Deficits and Fall Risk, and Gait Deficits that justify a need for formal Physical Therapy. These impairments interfere with their ability to perform self care tasks, work tasks, recreational activities, household chores, driving , household mobility, and community mobility as compared to their previous level of function.    Medical Diagnosis: Chronic Midline Low Back Pain with Bilateral Sciatica    Treatment Diagnosis: Bilateral Low Back Pain with bilateral Sciatica     Clinical Decision Making (Complexity):  Clinical Presentation: Unstable/Unpredictable   Clinical Presentation Rationale: based on medical and personal factors listed in PT evaluation  Clinical Decision Making (Complexity): High complexity      PHYSICAL THERAPY PLAN OF CARE:  Treatment Interventions:  Modalities: Cryotherapy, E-stim, Hot Pack, Ultrasound  Interventions: Gait Training, Manual Therapy, Neuromuscular Re-education, Therapeutic Activity, Therapeutic  Exercise    Long Term Goals     PT Goal 1  Goal Identifier: Standing and Walking  Goal Description: Mario will be able to stand for 10 minutes or walk for 5 minutes without assistance and without back or leg pain worse than 4/10 that does not radiate below the knees  Rationale: to maximize safety and independence with performance of ADLs and functional tasks;to maximize safety and independence within the home;to maximize safety and independence within the community;to maximize safety and independence with transportation;to maximize safety and independence with self cares  Goal Progress: Significantly worsened pain with any standing or walking  Target Date: 11/02/24  PT Goal 2  Goal Identifier: Return to Work  Goal Description: Mario will be able to return work with lifting restrictions for 4 hours a day without increased back pain that does not radiate beyond his knee and is not worse than 5/10  Rationale: to maximize safety and independence with performance of ADLs and functional tasks;to maximize safety and independence within the home;to maximize safety and independence within the community;to maximize safety and independence with transportation;to maximize safety and independence with self cares  Goal Progress: Unable to tolerate standing, working is unable to tolerate  Target Date: 12/28/24  PT Goal 3  Goal Identifier: Supine and Prone lying  Goal Description: Mario will tolerate either prone lying or supine lying without increased hip, leg or foot symptoms worse than 6/10 after lying for 5 minutes  Rationale: to maximize safety and independence with performance of ADLs and functional tasks;to maximize safety and independence within the home;to maximize safety and independence within the community;to maximize safety and independence with transportation;to maximize safety and independence with self cares  Goal Progress: Both supine and prone lying increase symptoms and peripheralized to the foot  Target Date:  11/02/24    Frequency of Treatment: 2x a week  Duration of Treatment: 12 weeks         Risks and benefits of evaluation/treatment have been explained.   Patient/Family/caregiver agrees with Plan of Care.      Evaluation Time:     PT Eval, High Complexity Minutes (89585): 30         Signing Clinician: Mario Garcia PT        SUMMARY OF PLAN OF CARE:  Mario presents with complex low back pain.  His MRI summary tates he has a severe central posterior disc / osteophyte complex and severe spinal stenosis.  He has 3 red flag symptoms including bilateral lower extremity symptoms including pain and numbness, saddle anesthesia and numbness in his genital region, and also reports loss of bladder control.  After being admitted to the hospital, the patient reports his neurosurgical team did not recommend surgery at this time until he had lost weight and quit smoking for 3 months.  Therefore, he has been referred to physical therapy for conservative care.  Fortunately, since his hospital discharge Mario reports that his right leg numbness is beginning to centralize to his upper right thigh and upper right glute.  I have previously had success with MDT based interventions for similar severe central posterior disc osteophyte complexes and bilateral symptoms, however unfortunately today no specific directional preference was uncovered.  He did find relief with Quadruped and gentle cat cow, so this intervention was added.  He was also informed on sleeping with his legs slightly propped with something under his knee to achieve spinal neutral at night to help with night pain.  Lastly, he was educated regarding his condition, the significance of red flag symptoms and what to do if they return, and lastly the rehab process including the importance of centralization/peripheralization.  In therapy, we intend to progress things slowly and cautiously based on his symptom response due to his red flag symptoms.  With each session we will  gradually grow his program as tolerated.          [facility]                                                                                   OUTPATIENT PHYSICAL THERAPY      PLAN OF TREATMENT FOR OUTPATIENT REHABILITATION   Patient's Last Name, First Name, Mario Guzman YOB: 1995   Provider's Name   [facility]   Medical Record No.  [id number]     Onset Date: 09/27/24  Start of Care Date: 10/05/24     Medical Diagnosis:  Chronic Midline Low Back Pain with Bilateral Sciatica      PT Treatment Diagnosis:  Bilateral Low Back Pain with bilateral Sciatica Plan of Treatment  Frequency/Duration: 2x a week/ 12 weeks    Certification date from 10/05/24 to 12/28/24         See note for plan of treatment details and functional goals     Mario Garcia, PT                         I CERTIFY THE NEED FOR THESE SERVICES FURNISHED UNDER        THIS PLAN OF TREATMENT AND WHILE UNDER MY CARE     (Physician attestation of this document indicates review and certification of the therapy plan).              Referring Provider:  Remigio Cortes MD    Initial Assessment  See Epic Evaluation- Start of Care Date: 10/05/24

## 2024-10-08 ENCOUNTER — HOSPITAL ENCOUNTER (OUTPATIENT)
Facility: CLINIC | Age: 29
Setting detail: OBSERVATION
Discharge: HOME | End: 2024-10-09
Attending: EMERGENCY MEDICINE | Admitting: HOSPITALIST
Payer: COMMERCIAL

## 2024-10-08 ENCOUNTER — OFFICE VISIT (OUTPATIENT)
Dept: FAMILY MEDICINE | Facility: CLINIC | Age: 29
End: 2024-10-08
Payer: COMMERCIAL

## 2024-10-08 VITALS
TEMPERATURE: 97.7 F | RESPIRATION RATE: 16 BRPM | SYSTOLIC BLOOD PRESSURE: 131 MMHG | HEART RATE: 87 BPM | OXYGEN SATURATION: 98 % | DIASTOLIC BLOOD PRESSURE: 87 MMHG

## 2024-10-08 DIAGNOSIS — R20.0 BILATERAL LEG NUMBNESS: ICD-10-CM

## 2024-10-08 DIAGNOSIS — K59.01 SLOW TRANSIT CONSTIPATION: ICD-10-CM

## 2024-10-08 DIAGNOSIS — M54.16 LUMBAR RADICULOPATHY: ICD-10-CM

## 2024-10-08 DIAGNOSIS — R33.9 URINARY RETENTION: Primary | ICD-10-CM

## 2024-10-08 DIAGNOSIS — R32 URINARY INCONTINENCE, UNSPECIFIED TYPE: ICD-10-CM

## 2024-10-08 DIAGNOSIS — R29.898 LEFT LEG WEAKNESS: ICD-10-CM

## 2024-10-08 PROCEDURE — 99222 1ST HOSP IP/OBS MODERATE 55: CPT | Performed by: HOSPITALIST

## 2024-10-08 PROCEDURE — 99285 EMERGENCY DEPT VISIT HI MDM: CPT | Mod: 25 | Performed by: EMERGENCY MEDICINE

## 2024-10-08 PROCEDURE — 99214 OFFICE O/P EST MOD 30 MIN: CPT

## 2024-10-08 PROCEDURE — 250N000013 HC RX MED GY IP 250 OP 250 PS 637: Performed by: HOSPITALIST

## 2024-10-08 PROCEDURE — G0378 HOSPITAL OBSERVATION PER HR: HCPCS

## 2024-10-08 PROCEDURE — 51798 US URINE CAPACITY MEASURE: CPT | Performed by: EMERGENCY MEDICINE

## 2024-10-08 PROCEDURE — 99285 EMERGENCY DEPT VISIT HI MDM: CPT | Performed by: EMERGENCY MEDICINE

## 2024-10-08 RX ORDER — IBUPROFEN 600 MG/1
600 TABLET, FILM COATED ORAL ONCE
Status: DISCONTINUED | OUTPATIENT
Start: 2024-10-08 | End: 2024-10-08

## 2024-10-08 RX ORDER — ENOXAPARIN SODIUM 100 MG/ML
40 INJECTION SUBCUTANEOUS EVERY 12 HOURS SCHEDULED
Status: DISCONTINUED | OUTPATIENT
Start: 2024-10-09 | End: 2024-10-09 | Stop reason: HOSPADM

## 2024-10-08 RX ORDER — AMOXICILLIN 250 MG
1 CAPSULE ORAL 2 TIMES DAILY PRN
Status: DISCONTINUED | OUTPATIENT
Start: 2024-10-08 | End: 2024-10-09 | Stop reason: HOSPADM

## 2024-10-08 RX ORDER — PREGABALIN 75 MG/1
75 CAPSULE ORAL 3 TIMES DAILY
Status: DISCONTINUED | OUTPATIENT
Start: 2024-10-09 | End: 2024-10-09 | Stop reason: HOSPADM

## 2024-10-08 RX ORDER — POLYETHYLENE GLYCOL 3350 17 G/17G
17 POWDER, FOR SOLUTION ORAL DAILY PRN
Status: DISCONTINUED | OUTPATIENT
Start: 2024-10-08 | End: 2024-10-09 | Stop reason: HOSPADM

## 2024-10-08 RX ORDER — LIDOCAINE 40 MG/G
CREAM TOPICAL
Status: DISCONTINUED | OUTPATIENT
Start: 2024-10-08 | End: 2024-10-09 | Stop reason: HOSPADM

## 2024-10-08 RX ORDER — CALCIUM CARBONATE 500 MG/1
1000 TABLET, CHEWABLE ORAL 4 TIMES DAILY PRN
Status: DISCONTINUED | OUTPATIENT
Start: 2024-10-08 | End: 2024-10-09 | Stop reason: HOSPADM

## 2024-10-08 RX ORDER — BUPROPION HYDROCHLORIDE 150 MG/1
150 TABLET, EXTENDED RELEASE ORAL 2 TIMES DAILY
Status: DISCONTINUED | OUTPATIENT
Start: 2024-10-08 | End: 2024-10-09 | Stop reason: HOSPADM

## 2024-10-08 RX ADMIN — IBUPROFEN 800 MG: 200 TABLET, FILM COATED ORAL at 22:42

## 2024-10-08 ASSESSMENT — ACTIVITIES OF DAILY LIVING (ADL)
ADLS_ACUITY_SCORE: 37

## 2024-10-08 ASSESSMENT — ASTHMA QUESTIONNAIRES
QUESTION_2 LAST FOUR WEEKS HOW OFTEN HAVE YOU HAD SHORTNESS OF BREATH: NOT AT ALL
QUESTION_1 LAST FOUR WEEKS HOW MUCH OF THE TIME DID YOUR ASTHMA KEEP YOU FROM GETTING AS MUCH DONE AT WORK, SCHOOL OR AT HOME: NONE OF THE TIME
QUESTION_4 LAST FOUR WEEKS HOW OFTEN HAVE YOU USED YOUR RESCUE INHALER OR NEBULIZER MEDICATION (SUCH AS ALBUTEROL): NOT AT ALL
QUESTION_5 LAST FOUR WEEKS HOW WOULD YOU RATE YOUR ASTHMA CONTROL: COMPLETELY CONTROLLED
QUESTION_3 LAST FOUR WEEKS HOW OFTEN DID YOUR ASTHMA SYMPTOMS (WHEEZING, COUGHING, SHORTNESS OF BREATH, CHEST TIGHTNESS OR PAIN) WAKE YOU UP AT NIGHT OR EARLIER THAN USUAL IN THE MORNING: NOT AT ALL
ACT_TOTALSCORE: 25
ACT_TOTALSCORE: 25

## 2024-10-08 NOTE — ED PROVIDER NOTES
[facility] EMERGENCY DEPARTMENT ([facility])    10/08/24       ED PROVIDER NOTE    History     Chief Complaint   Patient presents with    Numbness    Urinary Retention     HPI  Mario Calderon is a 28 year old male with a past medical history of bilateral leg numbness, urinary incontinence, urinary retention, sciatica, hypertension, and appendicitis, who presents to the ED for evaluation of urinary retention. Patient reports he has been having ongoing symptoms for the past week and a half. Patient states he had an MRI done and was told he had an issue with a disc in his back which is resulting in his symptoms. Patient reports left sided numbness, bilateral leg weakness, incontinence, urinary retention, constipation, and difficulty peeing. Patient states he had an appointment with Neuro and got a call saying he was already seen and had his appointment cancelled. Patient notes he was at physical therapy for one day and was given a few exercises to help with his symptoms. Patient states his primary care physician in [place] told him to come in today after his continuous symptoms. Patient reports his sensation is intact in the front of his legs but not the back. Patient also reports back pain that has made it difficult for him to sleep.     Past Medical History  Past Medical History:   Diagnosis Date    ADD (attention deficit disorder)     Allergic rhinitis due to animal dander     Allergy to mold spores     8/28/12 skin tests pos. for:  dog(+)/DM/M/T/G/W    Asthma, exercise induced     excersise induced    Diagnostic skin and sensitization tests 8/28/12 skin tests pos. for:  dog(+)/DM/M/T/G/W    Essential hypertension with goal blood pressure less than 140/90 9/1/2017    House dust mite allergy     Seasonal allergic conjunctivitis     Seasonal allergic rhinitis      Past Surgical History:   Procedure Laterality Date    PE TUBES      ZZC HAND/FINGER SURGERY UNLISTED      ZZC TOTAL KNEE ARTHROPLASTY        acetaminophen (TYLENOL) 325 MG tablet  buPROPion (WELLBUTRIN SR) 150 MG 12 hr tablet  ibuprofen (ADVIL/MOTRIN) 800 MG tablet  oxyCODONE IR (ROXICODONE) 10 MG tablet  polyethylene glycol (MIRALAX) 17 GM/Dose powder  pregabalin (LYRICA) 50 MG capsule  SENNA-docusate sodium (SENNA S) 8.6-50 MG tablet      No Known Allergies  Family History  Family History   Problem Relation Age of Onset    Psychotic Disorder Mother     Thyroid Disease Mother     Blood Disease Mother         hep b    Diabetes Maternal Grandmother     Cerebrovascular Disease Maternal Grandmother     Glaucoma Maternal Grandmother     Asthma Brother     Asthma Sister     Asthma Brother     Hypertension No family hx of     Macular Degeneration No family hx of     Cancer Maternal Aunt      Social History   Social History     Tobacco Use    Smoking status: Every Day     Types: Cigarettes    Smokeless tobacco: Never   Substance Use Topics    Alcohol use: No    Drug use: No      A complete review of systems was performed with pertinent positives and negatives noted in the HPI, and all other systems negative.    Physical Exam      Physical Exam  Vitals and nursing note reviewed.   Constitutional:       General: He is not in acute distress.     Appearance: He is well-developed. He is not diaphoretic.   HENT:      Head: Normocephalic and atraumatic.      Nose: Nose normal. No congestion.      Mouth/Throat:      Mouth: Mucous membranes are moist.      Pharynx: Oropharynx is clear.   Eyes:      General: No scleral icterus.     Extraocular Movements: Extraocular movements intact.      Conjunctiva/sclera: Conjunctivae normal.      Pupils: Pupils are equal, round, and reactive to light.   Cardiovascular:      Rate and Rhythm: Normal rate.   Pulmonary:      Effort: Pulmonary effort is normal.   Abdominal:      General: Abdomen is flat.   Musculoskeletal:         General: No tenderness or deformity. Normal range of motion.      Cervical back: Normal range of motion  and neck supple.      Comments: Right-sided BKA with prosthetic   Lymphadenopathy:      Cervical: No cervical adenopathy.   Skin:     General: Skin is warm and dry.      Capillary Refill: Capillary refill takes less than 2 seconds.      Findings: No rash.   Neurological:      Mental Status: He is alert.      Cranial Nerves: No cranial nerve deficit.      Sensory: Sensory deficit present.      Coordination: Coordination normal.      Comments: Decree sensation to the left lower extremity       ED Course, Procedures, & Data      Procedures            No results found for any visits on 10/08/24.  Medications - No data to display  Labs Ordered and Resulted from Time of ED Arrival to Time of ED Departure - No data to display  No orders to display          Assessment & Plan      28 year old male with a past medical history of bilateral leg numbness, urinary incontinence, urinary retention, sciatica, hypertension, and appendicitis, who presents to the ED for evaluation of urinary retention.  Vital signs stable and afebrile including normal pulse ox 97% on room air.  Neurosurgery consulted, please refer to the note for further details.  Postvoid residual obtained at 116, repeat MRI therefore discontinued by neurosurgery.  Recommendation for observation status with PT OT in the morning consideration for possible placement.    I have reviewed the nursing notes. I have reviewed the findings, diagnosis, plan and need for follow up with the patient.    New Prescriptions    No medications on file       Final diagnoses:   Left leg weakness       I, Dwaine Jack, am serving as a trained medical scribe to document services personally performed by Julia Kamara MD based on the provider's statements to me on October 8, 2024.  This document has been checked and approved by the attending provider.    I, Julia Kamara MD, was physically present and have reviewed and verified the accuracy of this note documented by Dwaine Jack, medical  oswaldo.      Julia Kamaar MD  [facility] EMERGENCY DEPARTMENT  10/8/2024     Anh, Julia SEGUNDO MD  10/08/24 [id number]

## 2024-10-08 NOTE — PROGRESS NOTES
Assessment & Plan     Urinary retention  Urinary incontinence, unspecified type  Bilateral leg numbness  Lumbar radiculopathy  Slow transit constipation  - with worsening sxs, joint decision making with patient to return to ER for further workup / assessment  - work excuse letter provided today     MED REC REQUIRED{  Post Medication Reconciliation Status: reconciled    BMI  Estimated body mass index is 44.76 kg/m  as calculated from the following:    Height as of 9/27/24: 1.829 m (6').    Weight as of 9/27/24: 149.7 kg (330 lb).     RTC to establish care pending ER visit. The patient verbalized understanding and agreement with the plan today and has no additional questions or concerns at this time.    Whit Martin is a 28 year old, presenting for the following health issues:  Hospital F/U        10/8/2024     2:20 PM   Additional Questions   Roomed by sina   Accompanied by lucas huerta         10/8/2024     2:20 PM   Patient Reported Additional Medications   Patient reports taking the following new medications no     HPI     Hospital Follow-up Visit:    Hospital/Nursing Home/IP Rehab Facility: [facility]  Date of Admission: 09/27/2027   Date of Discharge: 10/01/2024  Reason(s) for Admission: back pain, weakness and urinary incontinence   Was the patient in the ICU or did the patient experience delirium during hospitalization?  No, was in ER and transfer to Neurosurgery center   Do you have any other stressors you would like to discuss with your provider? Job Concerns and Health Concerns    Problems taking medications regularly:  None  Medication changes since discharge: None  Problems adhering to non-medication therapy:  None    Summary of hospitalization:  [facility] discharge summary reviewed  Diagnostic Tests/Treatments reviewed.  Follow up needed: neurosurgey  Other Healthcare Providers Involved in Patient s Care:         Specialist  "appointment - neurosurgery and Physical Therapy  Update since discharge: worsened.     Interval history: reports since hospitalization, he has noticed increasing weakness in his lower extremities, persistent urinary incontinence w/ severe urinary hesitancy, and decreased ability to have a bowel movement. Reports pain that feels like \"burning sensation\" in the left leg that is worse at night. When he stands, he notices the urinary incontinence but he has not been able to empty his bladder otherwise. He had 3 days of very small, \"tarish\" colored stools. The numbness is the same as discharge and appreciated in the groin and left extremity. Reports the right lower extremity is not numb but the right buttock has some numbness.     Denies any new rashes, fevers or chills, abdominal pain, numbness/weakness of upper extremities, chest pain or shortness of breath.     Finished prescribed course of steroid and oxycodone. Was taking the senna-docusate as well. Is still using Lyrica.     Had appointment with neurosurgery but reports they called to cancel stating he did not need this. Is having difficulty with ambulation at home still and requiring a lot of assistance from brother.     Review of Systems  Constitutional, HEENT, cardiovascular, pulmonary, GI, , musculoskeletal, neuro, skin, endocrine and psych systems are negative, except as otherwise noted.      Objective    /87 (BP Location: Right arm, Patient Position: Sitting, Cuff Size: Adult Large)   Pulse 87   Temp 97.7  F (36.5  C) (Oral)   Resp 16   SpO2 98%   There is no height or weight on file to calculate BMI.  Physical Exam     General:  alert, oriented, pleasant and conversant. NAD. Non-toxic  HEENT:  normocephalic, atraumatic. Sclera white, conjunctiva clear, EOMs intact. PERRL. OP w/o erythema, edema or lesions.  Normal dentition  Neck:  supple, FROM; no thyromegaly, lymphadenopathy, or masses   Chest: chest expansion symmetrical bilaterally, lung " sounds clear without wheezes, rhonchi or rales  CV: S1, S2, RRR without murmurs, rubs or gallops. No edema  Abd: bowel sounds present, soft, non-distended, non-tender. No organomegaly or mass   MSK: in wheelchair  Derm: no rashes, lesions, or ulcers. No erythema, induration or nodules  Neuro: CNII-XII grossly intact, clear and logical thought and speech  Psych:  cooperative, calm mood and congruent affect    Signed Electronically by: BROCK Hanson CNP

## 2024-10-08 NOTE — LETTER
October 8, 2024      Mario Calderon  [address] [place] [address]        To Whom It May Concern:    Mario Calderon was seen on 10/8/24.  Please excuse him  until 10/22/24 due to illness.        Sincerely,        Perfecto Wolff, BROCK CNP

## 2024-10-08 NOTE — ED TRIAGE NOTES
Pt arrives from outpatient clinic via wheelchair, accompanied by brother. Pt recently admitted with complaints of numbness and tingling to L lower back and LLE. Pt seen by NSGY and told to follow up, but reports follow up appt was cancelled.     Triage Assessment (Adult)       Row Name 10/08/24 163          Respiratory WDL    Respiratory WDL WDL        Skin Circulation/Temperature WDL    Skin Circulation/Temperature WDL WDL        Cardiac WDL    Cardiac WDL WDL        Peripheral/Neurovascular WDL    Peripheral Neurovascular WDL X;neurovascular assessment lower        Cognitive/Neuro/Behavioral WDL    Cognitive/Neuro/Behavioral WDL WDL        LLE Neurovascular Assessment    Sensation LLE numbness present;tingling present

## 2024-10-09 ENCOUNTER — APPOINTMENT (OUTPATIENT)
Dept: PHYSICAL THERAPY | Facility: CLINIC | Age: 29
End: 2024-10-09
Attending: STUDENT IN AN ORGANIZED HEALTH CARE EDUCATION/TRAINING PROGRAM
Payer: COMMERCIAL

## 2024-10-09 VITALS
RESPIRATION RATE: 16 BRPM | DIASTOLIC BLOOD PRESSURE: 85 MMHG | BODY MASS INDEX: 6444.87 KG/M2 | TEMPERATURE: 98.8 F | HEIGHT: 60 IN | HEART RATE: 95 BPM | OXYGEN SATURATION: 98 % | SYSTOLIC BLOOD PRESSURE: 127 MMHG

## 2024-10-09 LAB
ANION GAP SERPL CALCULATED.3IONS-SCNC: 10 MMOL/L (ref 7–15)
BUN SERPL-MCNC: 12.4 MG/DL (ref 6–20)
CALCIUM SERPL-MCNC: 8.9 MG/DL (ref 8.8–10.4)
CHLORIDE SERPL-SCNC: 105 MMOL/L (ref 98–107)
CREAT SERPL-MCNC: 0.84 MG/DL (ref 0.67–1.17)
EGFRCR SERPLBLD CKD-EPI 2021: >90 ML/MIN/1.73M2
GLUCOSE SERPL-MCNC: 128 MG/DL (ref 70–99)
HCO3 SERPL-SCNC: 24 MMOL/L (ref 22–29)
POTASSIUM SERPL-SCNC: 3.6 MMOL/L (ref 3.4–5.3)
SODIUM SERPL-SCNC: 139 MMOL/L (ref 135–145)

## 2024-10-09 PROCEDURE — 250N000013 HC RX MED GY IP 250 OP 250 PS 637: Performed by: HOSPITALIST

## 2024-10-09 PROCEDURE — G0378 HOSPITAL OBSERVATION PER HR: HCPCS

## 2024-10-09 PROCEDURE — 80048 BASIC METABOLIC PNL TOTAL CA: CPT | Performed by: HOSPITALIST

## 2024-10-09 PROCEDURE — 36415 COLL VENOUS BLD VENIPUNCTURE: CPT | Performed by: HOSPITALIST

## 2024-10-09 PROCEDURE — 250N000013 HC RX MED GY IP 250 OP 250 PS 637: Performed by: INTERNAL MEDICINE

## 2024-10-09 PROCEDURE — 999N000111 HC STATISTIC OT IP EVAL DEFER

## 2024-10-09 PROCEDURE — 97530 THERAPEUTIC ACTIVITIES: CPT | Mod: GP

## 2024-10-09 PROCEDURE — 97116 GAIT TRAINING THERAPY: CPT | Mod: GP

## 2024-10-09 PROCEDURE — 97161 PT EVAL LOW COMPLEX 20 MIN: CPT | Mod: GP

## 2024-10-09 PROCEDURE — 99239 HOSP IP/OBS DSCHRG MGMT >30: CPT | Performed by: STUDENT IN AN ORGANIZED HEALTH CARE EDUCATION/TRAINING PROGRAM

## 2024-10-09 RX ORDER — NALOXONE HYDROCHLORIDE 0.4 MG/ML
0.4 INJECTION, SOLUTION INTRAMUSCULAR; INTRAVENOUS; SUBCUTANEOUS
Status: DISCONTINUED | OUTPATIENT
Start: 2024-10-09 | End: 2024-10-09 | Stop reason: HOSPADM

## 2024-10-09 RX ORDER — OXYCODONE HYDROCHLORIDE 5 MG/1
5 TABLET ORAL EVERY 4 HOURS PRN
Status: DISCONTINUED | OUTPATIENT
Start: 2024-10-09 | End: 2024-10-09 | Stop reason: HOSPADM

## 2024-10-09 RX ORDER — OXYCODONE HYDROCHLORIDE 10 MG/1
10 TABLET ORAL EVERY 4 HOURS PRN
Qty: 5 TABLET | Refills: 0 | Status: SHIPPED | OUTPATIENT
Start: 2024-10-09 | End: 2024-10-21

## 2024-10-09 RX ORDER — NALOXONE HYDROCHLORIDE 0.4 MG/ML
0.2 INJECTION, SOLUTION INTRAMUSCULAR; INTRAVENOUS; SUBCUTANEOUS
Status: DISCONTINUED | OUTPATIENT
Start: 2024-10-09 | End: 2024-10-09 | Stop reason: HOSPADM

## 2024-10-09 RX ADMIN — BUPROPION HYDROCHLORIDE 150 MG: 150 TABLET, FILM COATED, EXTENDED RELEASE ORAL at 01:58

## 2024-10-09 RX ADMIN — IBUPROFEN 800 MG: 200 TABLET, FILM COATED ORAL at 08:11

## 2024-10-09 RX ADMIN — BUPROPION HYDROCHLORIDE 150 MG: 150 TABLET, FILM COATED, EXTENDED RELEASE ORAL at 13:12

## 2024-10-09 RX ADMIN — OXYCODONE HYDROCHLORIDE 5 MG: 5 TABLET ORAL at 08:11

## 2024-10-09 RX ADMIN — OXYCODONE HYDROCHLORIDE 5 MG: 5 TABLET ORAL at 13:12

## 2024-10-09 RX ADMIN — PREGABALIN 75 MG: 75 CAPSULE ORAL at 13:12

## 2024-10-09 RX ADMIN — OXYCODONE HYDROCHLORIDE 5 MG: 5 TABLET ORAL at 03:28

## 2024-10-09 RX ADMIN — OXYCODONE HYDROCHLORIDE 5 MG: 5 TABLET ORAL at 17:45

## 2024-10-09 RX ADMIN — IBUPROFEN 800 MG: 200 TABLET, FILM COATED ORAL at 17:45

## 2024-10-09 RX ADMIN — PREGABALIN 75 MG: 75 CAPSULE ORAL at 08:11

## 2024-10-09 ASSESSMENT — ACTIVITIES OF DAILY LIVING (ADL)
ADLS_ACUITY_SCORE: 30
ADLS_ACUITY_SCORE: 37
ADLS_ACUITY_SCORE: 30
ADLS_ACUITY_SCORE: 39
ADLS_ACUITY_SCORE: 39
ADLS_ACUITY_SCORE: 30

## 2024-10-09 NOTE — PLAN OF CARE
Physical Therapy Discharge Summary    Reason for therapy discharge:    Discharged to home with outpatient therapy.    Progress towards therapy goal(s). See goals on [facility] in [facility] record for goal details.  Goals partially met.  Barriers to achieving goals:   Pt discharging home with assist and understanding of future plan to OP PT where session are scheduled.    Therapy recommendation(s):    Continued therapy is recommended.  Rationale/Recommendations:  Pt discharge home with assist and OP PT. Pt has impaired mobility but has assist for home from friends. Pt has manual w/c and knee scooter at home. Pt is confident with discharge plan home with assist and continued OP therapy.

## 2024-10-09 NOTE — PLAN OF CARE
Occupational Therapy: Orders received. Chart reviewed and discussed with care team.? Occupational Therapy not indicated as patient is IND with dressing, will get a shower chair for shower task, has walk in shower, mod I for basic transfers.? Pt denies concerns with ADLs or with discharge home. Defer discharge recommendations to PT/medical team.? Will complete orders.

## 2024-10-09 NOTE — PROGRESS NOTES
Brief Neurosurgery Progress Note    MRI of lumbar spine from 9/27/2024 reviewed - large calcified disc/osteophyte complexes causing moderate to severe spinal canal stenosis from L3-4, L4-5 and L5-S1. Severe foraminal stenosis at L5-S1 and moderate right foraminal stenosis at L4-5 .     ASSESSMENT:  Continued pain-limited weakness, no neurologic weakness, and persistent numbness, urinary incontinence secondary to known multilevel spinal canal stenosis for over 1 week, surgery deferred secondary to age, obesity, smoking status, and high surgical risk from those factors   Significant reduction in functional independence from baseline secondary to pain     RECOMMENDATIONS:  No emergent neurosurgical intervention indicated at this time  Recommend physical therapy and occupational therapy to assess the patient to determine functional status   Recommend Medrol dose pack and multimodal pain control including muscle relaxants. Can also consider gabapentin for neuropathic pain, and NSAIDs okay to use.   Med Spine referral for epidural injection  Smoking Cessation Referral  Patient should follow-up with Neurosurgery once he has quit smoking  Neurosurgery will sign off today.  Please call with questions or concerns      BROCK Gomes, CNP  Neurosurgery  Pager [phone]

## 2024-10-09 NOTE — H&P
[facility]    History and Physical - Hospitalist Service, GOLD TEAM        Date of Admission:  10/8/2024    Assessment & Plan      Mario Calderon is a 29 yo M with pmhx of R sided sciatica, s/p R foot amputation 2016, HTN, ADHD previously admitted on 9/27/2024 with BL LE numbness and radicular pain, loss of bladder control, saddle anesthesia, with concerns for conus medularis syndrome, consulted Neurosurgery service who recommended conservative management with possible surgical intervention as outpatient and presents back due to continued inability to ambulate      Severe Thoracic, Lumbar  spinal and foraminal stenosis  BL LE paresthesias, subjective weakness, radicular pain  Saddle anesthesia  Bladder incontinence  C/f urinary retention  H/o R sided sciatica  Conus medularis syndrome  P/w sudden onset of BL LE numbness/ radicular pain/ subjective weakness, saddle anesthesia, loss of bladder control on morning of 9/27. Does have h/o R sided sciatica w/ pain down his R leg, but has never had BL symptoms and has never had this severity. Saw a chiropractor on 9/25 for sciatic pain-- no pain immediately after the appointment, but now w/ this presentation. Lumbar MRI demonstrated severe canal stenosis at L3-4/L4-5 w/ moderate stenosis at L5-S1; moderate R neural foraminal stenosis at L4-5 and severe L neural foraminal stenosis at L5-S1. Thoracic MRI w/ mild to moderate spinal canal stenosis at T11-L1, Presentation is suspicious for cauda equina, conus medullaris syndrome but no myelopathy on MRI imaging. Neurosurgery evaluation x 2 - recommendations are for a spinal fusion, after Mario has quit smoking for 3 mo and lost weight in the outpatient setting. Patient was discharged with plans for outpatient follow up, physical therapy, PMR for steroid injections but presents back due to continued inability to ambulate  - Evaluated by Neurosurgery service in the ER and no  "further surgical intervention indicated and recommending outpatient follow up as per prior plan  - neurochecks  - Bladder scan in ER negative for significant urinary retention. Improving constipation. Continue bowel regimen     # Pain control:   - continued uncontrolled pain. Patient does not want opiates due to risk of worsening constipation  - will increase Lyrica, continue tylenol and ibuprofen. Avoid opiates         H/o R foot amputation: Has prosthetic.      Tobacco dependence:  Goal is cessation.  Discussed options. Initiated wellbutrin SR, 150 mg daily x 3 days, and then increase to BID for 12 weeks at prior admission and continue 150 mg BID           Diet: Regular Diet Adult  DVT Prophylaxis: Enoxaparin (Lovenox) SQ  Palcaios Catheter: Not present  Lines: None     Cardiac Monitoring: None  Code Status: Full Code      Clinically Significant Risk Factors Present on Admission                  # Hypertension: Noted on problem list         # Severe Obesity: Estimated body mass index is 6,444.87 kg/m  as calculated from the following:    Height as of this encounter: 0.152 m (6\").    Weight as of 9/27/24: 149.7 kg (330 lb).         # Financial/Environmental Concerns:    # Asthma: noted on problem list        Disposition Plan      Expected Discharge Date: 10/09/2024                  Remigio Cortes MD  Hospitalist Service, [facility]  Securely message with [employer] (more info)  Text page via [facility] Paging/Directory   See signed in provider for up to date coverage information    ______________________________________________________________________    Chief Complaint   Lower extremity weakness     History is obtained from the patient    History of Present Illness   Mario Calderon is a 27 yo M with pmhx of R sided sciatica, s/p R foot amputation 2016, HTN, ADHD previously admitted on 9/27/2024 with BL LE numbness and radicular pain, loss of bladder control, " saddle anesthesia, with concerns for conus medularis syndrome, consulted Neurosurgery service. Does have h/o R sided sciatica w/ pain down his R leg, but has never had BL symptoms and has never had this severity. Saw a chiropractor on 9/25 for sciatic pain-- no pain immediately after the appointment, but now w/ this presentation.  Lumbar MRI demonstrated severe canal stenosis at L3-4/L4-5 w/ moderate stenosis at L5-S1; moderate R neural foraminal stenosis at L4-5 and severe L neural foraminal stenosis at L5-S1. Thoracic MRI w/ mild to moderate spinal canal stenosis at T11-L1, Presentation is suspicious for cauda equina, conus medullaris syndrome but no myelopathy on MRI imaging. Neurosurgery evaluation x 2 - recommendations are for a spinal fusion, after Maroi has quit smoking for 3 mo and lost weight in the outpatient setting. Patient was discharged with plans for outpatient follow up, physical therapy, PMR for steroid injections but presents back due to continued inability to ambulate. Does not report change in his saddle anesthesia, difficulty with urination and bowel incontinence. Bladder scan in ER negative for significant urinary retention.      Past Medical History    Past Medical History:   Diagnosis Date    ADD (attention deficit disorder)     Allergic rhinitis due to animal dander     Allergy to mold spores     8/28/12 skin tests pos. for:  dog(+)/DM/M/T/G/W    Asthma, exercise induced     excersise induced    Diagnostic skin and sensitization tests 8/28/12 skin tests pos. for:  dog(+)/DM/M/T/G/W    Essential hypertension with goal blood pressure less than 140/90 9/1/2017    House dust mite allergy     Seasonal allergic conjunctivitis     Seasonal allergic rhinitis        Past Surgical History   Past Surgical History:   Procedure Laterality Date    PE TUBES      ZZC HAND/FINGER SURGERY UNLISTED      ZC TOTAL KNEE ARTHROPLASTY         Prior to Admission Medications   Prior to Admission Medications    Prescriptions Last Dose Informant Patient Reported? Taking?   SENNA-docusate sodium (SENNA S) 8.6-50 MG tablet   No No   Sig: Take 1 tablet by mouth 2 times daily as needed (Constipation).   acetaminophen (TYLENOL) 325 MG tablet   No No   Sig: Take 3 tablets (975 mg) by mouth 3 times daily.   Patient not taking: Reported on 10/8/2024   buPROPion (WELLBUTRIN SR) 150 MG 12 hr tablet   No No   Sig: Take 1 tablet (150 mg) by mouth daily for 3 days, THEN 1 tablet (150 mg) 2 times daily.   ibuprofen (ADVIL/MOTRIN) 800 MG tablet   No No   Sig: Take 1 tablet (800 mg) by mouth every 6 hours as needed for moderate pain.   Patient not taking: Reported on 10/8/2024   oxyCODONE IR (ROXICODONE) 10 MG tablet   No No   Sig: Take 1 tablet (10 mg) by mouth every 4 hours as needed for severe pain.   polyethylene glycol (MIRALAX) 17 GM/Dose powder   No No   Sig: Take 17 g by mouth daily.   Patient not taking: Reported on 10/8/2024   pregabalin (LYRICA) 50 MG capsule   No No   Sig: Take 1 capsule (50 mg) by mouth 3 times daily.      Facility-Administered Medications: None        Review of Systems    The 10 point Review of Systems is negative other than noted in the HPI or here.      Physical Exam   Vital Signs: Temp: 98.3  F (36.8  C) Temp src: Oral BP: 127/84 Pulse: 88   Resp: 18 SpO2: 97 % O2 Device: None (Room air)    Weight: 0 lbs 0 oz    Physical Exam  Constitutional:       Appearance: He is obese.   HENT:      Mouth/Throat:      Mouth: Mucous membranes are moist.   Eyes:      Extraocular Movements: Extraocular movements intact.      Pupils: Pupils are equal, round, and reactive to light.   Cardiovascular:      Rate and Rhythm: Normal rate and regular rhythm.      Pulses: Normal pulses.      Heart sounds: Normal heart sounds. No murmur heard.  Pulmonary:      Effort: Pulmonary effort is normal. No respiratory distress.      Breath sounds: Normal breath sounds. No wheezing.   Abdominal:      General: Abdomen is flat. Bowel sounds  are normal. There is no distension.      Tenderness: There is no abdominal tenderness.   Neurological:      Mental Status: He is alert and oriented to person, place, and time.      Comments: No clear focal motor deficits appreciated               Medical Decision Making       35 MINUTES SPENT BY ME on the date of service doing chart review, history, exam, documentation & further activities per the note.      Data         Imaging results reviewed over the past 24 hrs:   No results found for this or any previous visit (from the past 24 hour(s)).\

## 2024-10-09 NOTE — CONSULTS
"[facility]  NEUROSURGERY CONSULTATION    This consultation was requested by Dr. Kamara from the Emergency Department service.    Reason for Consultation:   Ongoing numbness, worsening subjective weakness, and urinary retention secondary to known herniated discs in lumbar spine (L3-4)    HPI:  Mario Calderon is a 28 year old male with a notable past medical history of known herniated discs (L3-4, L4-5) causing multilevel severe spinal canal stenosis presenting today with complaint of persistent symptoms and subjective weakness that has progressed after his recent hospitalization. The patient initally presented to his PCP today, who suggested coming back to the ED for further evaluation.     In summary, the patient was evaluated by our team in the ED on 9/27/2024 due to bilateral thigh numbness, groin numbness, and urinary retention and incontinence. An MRI was obtained at this time which demonstrated multilevel discs causing severe spinal canal stenosis. The patient was ultimately admitted to the hospital for pain control and physical therapy. Unfortunately, because of the pateint's body habitus and ongoing smoking (1/2 pack per day), he was deemed not a good surgical candidate. The degree of stenosis of his spine would necessitate a multilevel fusion and the risk of CSF leak given the morphology of the discs would very high. The patient was ultimately discharged with a planned follow-up by PM&R for epidural steroid injections and physical therapy.     Today, the patient reports that his pain is quite bad, and he feels that his legs are like \"jello.\" He reports having significant difficulty transferring from his bed. He is now requiring a wheelchair, where prior to his visit, he was able to walk. His urinary incontinence is very frustrating to him. He has made an effort to reduce his smoking, and is working on weight loss. He is tearful and frustrated by the poor " "options but does demonstrate understanding due to the high risk associated with surgical intervention. He denies any bowel incontinence, instead endorses constipation. Denies new numbness, ongoing numbness in bilateral buttock, left lateral thigh and calf down to the plantar surface of his foot, and groin numbness.     ROS: 10 point ROS in other systems negative other than noted in HPI.    Physical exam:   Blood pressure (!) 143/92, pulse 88, temperature 97.9  F (36.6  C), temperature source Oral, resp. rate 18, height (!) 0.152 m (6\"), SpO2 97%.  General: Well appearing, NAD, sitting in wheel chair in room  HEENT: atraumatic, normocephalic  PULM: Unlabored respirations  MSK: RLE prosthetic device in place.  NEUROLOGIC:  -- Awake; Alert; oriented x 3  -- Follows commands briskly  -- Speech fluent, spontaneous. No aphasia or dysarthria.  -- no gaze preference. No apparent hemineglect.  Cranial Nerves:  -- visual fields full to confrontation, PERRL 3-2mm bilat and brisk, extraocular movements intact  -- face symmetrical, tongue midline  -- sensory V1-V3 intact bilaterally  -- Trapezii 5/5 strength bilat symmetric  Motor:  Normal bulk / tone; no tremor, rigidity, or bradykinesia.  No muscle wasting or fasciculations  No Pronator Drift   Delt Bi Tri Hand Flexion/  Extension Iliopsoas Quadriceps Hamstrings Tibialis Anterior Gastroc    C5 C6 C7 C8/T1 L2 L3 L4-S1 L4 S1   R 5 5 5 5 5 5 * * *   L 5 5 5 5 5 5* 5* 5* 5*   *components of the exam are pain limited but bursts of strength demonstrate that this is pain-limited, not true weakness  Sensory:  Numbness in bilateral buttock region, diminished sensation on left lateral thigh tracking down to lateral calf and plantar surface of foot  Reflexes: no clonus    Bi Tri BR Charlie Pat Ach Bab     C5-6 C7-8 C6 UMN L2-4 S1 UMN   R 2+ 2+ 2+ Norm 1+ * *   L 2+ 2+ 2+ Norm 1+ 1+ Norm   Gait: deferred     Pertinent Imaging and Labs:  No new imaging to review, no new labs    MRI of lumbar " spine from 9/27/2024 reviewed - large calcified disc/osteophyte complexes causing moderate to severe spinal canal stenosis from L3-4, L4-5 and L5-S1. Severe foraminal stenosis at L5-S1 and moderate right foraminal stenosis at L4-5 .    ASSESSMENT:  Worsening pain-limited weakness, no neurologic weakness, and persistent numbness, urinary incontinence/retention secondary to known multilevel spinal canal stenosis for over 1 week, surgery deferred secondary to age, obesity, smoking status, and high surgical risk from those factors   Significant reduction in functional independence from baseline    RECOMMENDATIONS:  No emergent neurosurgical intervention indicated at this time  Would recommend admission to medicine observation as no surgery is planned, but patient would benefit from therapies and possible rehab if indicated  Recommend physical therapy and occupational therapy to assess the patient to determine functional status  Recommend Medrol dose pack and multimodal pain control including muscle relaxants. Can also consider gabapentin for neuropathic pain, and NSAIDs okay to use.   Would request documentation of PVRs.   If PVRs are >200-300, would like to repeat MRI Lumbar spine with and without contrast to assess any interval progression in spinal stenosis  Neurosurgery to continue to follow examination     The patient's presentation, exam, and imaging and the listed recommendations/plan were discussed with Dr. Josue Burton, neurosurgery chief resident, and to be discussed with Dr. Felix Catalan, neurosurgery staff.    Balta Camargo MD  Neurosurgery  Pager: [phone]

## 2024-10-09 NOTE — PROGRESS NOTES
Pt's mobility adequate for discharge home today, will continue to follow if stays IP. Otherwise, resume OP PT.       10/09/24 9021   Appointment Info   Signing Clinician's Name / Credentials (PT) KRISTIE Rabago   Student Supervision On-site supervision provided;Therapy services provided with the co-signing licensed therapist guiding and directing the services, and providing the skilled judgement and assessment throughout the session   Living Environment   People in Home friend(s)   Current Living Arrangements house   Home Accessibility stairs to enter home   Number of Stairs, Main Entrance 1   Stair Railings, Main Entrance none   Transportation Anticipated family or friend will provide;car, drives self   Living Environment Comments Pt reports he feel comfortable with home set up and has assist of others living in home. Pt has knee scooter he uses within home. Has 24/7 support from friend and his mother.   Self-Care   Usual Activity Tolerance good   Current Activity Tolerance poor   Regular Exercise No   Equipment Currently Used at Home wheelchair, manual;walker, rolling  (knee scooter)   Fall history within last six months no   Activity/Exercise/Self-Care Comment Mirta with knee scooter in home, uses wc for community. 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, but has not yet been able to work lately 2/2 functional decline   General Information   Onset of Illness/Injury or Date of Surgery 10/08/24   Referring Physician Vicky Torres MD   Patient/Family Therapy Goals Statement (PT) To be able to walk farther distances, go back to work, increase activity tolerance   Pertinent History of Current Problem (include personal factors and/or comorbidities that impact the POC) Pt is a 27 yo M with pmhx of R sided sciatica, s/p R foot amputation 2016, HTN, ADHD previously admitted on 9/27/2024 with BL LE numbness and radicular pain, loss of bladder control, saddle anesthesia, with concerns for conus medularis  syndrome, consulted Neurosurgery service who recommended conservative management with possible surgical intervention as outpatient and presents back due to continued difficulty to ambulate.   Existing Precautions/Restrictions fall   General Observations Pt greeted supine in bed and agreeable to participate in PT.   Cognition   Affect/Mental Status (Cognition) WNL   Orientation Status (Cognition) oriented x 4   Follows Commands (Cognition) WNL   Pain Assessment   Patient Currently in Pain Yes, see Vital Sign flowsheet   Integumentary/Edema   Integumentary/Edema no deficits were identifed   Posture    Posture Forward head position;Protracted shoulders   Range of Motion (ROM)   Range of Motion ROM is WFL   Strength (Manual Muscle Testing)   Strength (Manual Muscle Testing) Deficits observed during functional mobility   Bed Mobility   Comment, (Bed Mobility) Supine<>sit Mirta   Transfers   Comment, (Transfers) Sit<>stand to SBA   Gait/Stairs (Locomotion)   Comment, (Gait/Stairs) 10' with FWW CGA   Balance   Balance other (describe)   Balance Comments Balance deficits noted during functional mobility   Sensory Examination   Sensory Perception Comments Pt reports sensory changes in LLE from hip to toes, which is the same as when he was admitted   Coordination   Coordination no deficits were identified   Muscle Tone   Muscle Tone no deficits were identified   Clinical Impression   Criteria for Skilled Therapeutic Intervention Yes, treatment indicated   PT Diagnosis (PT) Impaired functional mobility   Influenced by the following impairments Increased pain, decreased strength, impaired sensation, decreased activity tolerance   Functional limitations due to impairments bed mobility, transfers, amb, stairs   Clinical Presentation (PT Evaluation Complexity) evolving   Clinical Decision Making (Complexity) low complexity   Planned Therapy Interventions (PT) balance training;gait training;home exercise program;neuromuscular  re-education;patient/family education;strengthening;stair training;transfer training;wheelchair management/propulsion training;progressive activity/exercise;risk factor education;home program guidelines   Risk & Benefits of therapy have been explained evaluation/treatment results reviewed;care plan/treatment goals reviewed;risks/benefits reviewed;current/potential barriers reviewed;participants voiced agreement with care plan;participants included;patient   PT Total Evaluation Time   PT Eval, Low Complexity Minutes (09952) 13   Physical Therapy Goals   PT Goals Bed Mobility;Transfers;Gait;Stairs   PT: Transfers Modified independent;Sit to/from stand;Assistive device;Goal Met  (knee scooter)   PT: Gait Supervision/stand-by assist;Rolling walker;100 feet   PT: Stairs Minimal assist;1 stair;Rail on left   Interventions   Interventions Quick Adds Gait Training;Therapeutic Activity;Therapeutic Procedure   Therapeutic Activity   Therapeutic Activities: dynamic activities to improve functional performance Minutes (68496) 9   Symptoms Noted During/After Treatment Increased pain   Treatment Detail/Skilled Intervention Pt greeted supine in bed and agreeable to PT. Pt donned brief, shorts, shirt, and prosthetic IND.  Pt transfers supine<>sit x2 reps Mirta. Vc on second rep to not use bed rail to simulate at home environment. Pt sits EOB x4 minutes with intermittent touch of bed rail. Pt transfers sit<>supine x2 reps to FWW w/ SBA w/ vc to push off of bed. Pt reports increased pain and sensation down L LE. Increased session time 2/2 pt ordering food upon arrival. Pt left seated EOB with all needs within reach. pt expressing no concerns for home, feels safe with knee scooter and motviated to resume OP PT. Encouraged him to get shower chair out of storage for added safety.   Gait Training   Gait Training Minutes (45037) 23   Symptoms Noted During/After Treatment (Gait Training) fatigue;increased pain   Treatment Detail/Skilled  Intervention Pt completed R/L weight shifts x5 and staggered stance R/L weight shifts x5 at FWW with CGA for pregait training. Pt amb total of 120' with FWW with 2 hand CGA w/ w/c follow. Pt needed seated rest break half way x5mins 2/2 fatigue and pain. Pt amb w/ step through pattern with decreased gait speed and putting increased pressure through FWW w/ UE.Noted LE fatigue and weakness, needing tight CGA. L prothesis toe strike pattern 2/2 weakness and difficulty advancing to heel strike. Educated pt to take smaller steps and stay closer to FWW.   PT Discharge Planning   PT Plan simulate home set up knee scooter use and 1 step into home   PT Discharge Recommendation (DC Rec) home with assist;home with outpatient physical therapy   PT Rationale for DC Rec Pts mobility is limited by pain in back and down LE. Pt is confident regarding return home w/ assist from friends and use of knee scooter. Pt has manual w/c for longer distances. Recommend OP PT to continue progression of mobility. Pt in agreement with recommendations and feels confident regarding return home.   PT Brief overview of current status bed mobility Mirta, transfers SBA, amb with FWW CGA   Total Session Time   Timed Code Treatment Minutes 32   Total Session Time (sum of timed and untimed services) 45

## 2024-10-09 NOTE — PLAN OF CARE
"Goal Outcome Evaluation:      Plan of Care Reviewed With: patient    Overall Patient Progress: no change  Overall Patient Progress: no change       VS: Blood pressure 127/85, pulse 87, temperature 97.9  F (36.6  C), temperature source Oral, resp. rate 18, height (!) 0.152 m (6\"), SpO2 97%.    O2: RA   Output: Incontinent of bladder;continent of bowel   Last BM:    Activity: Independent w/ wheelchair   Skin: R foot amputee   Pain: oxycodone   CMS: Alert and oriented   Dressing:    Diet: Reg/thin/whole   LDA: L PIV   Equipment: Call light, personal belongings   Plan: Cont'd poc   Additional Info: Pt c/o pain, received prn meds, no further concerns          "

## 2024-10-09 NOTE — PHARMACY-ADMISSION MEDICATION HISTORY
Pharmacist Admission Medication History    Admission Medication History completed by Chante clinton on 9/28/2024. Please see Pharmacy - Admission Medication History note under previous encounter at [facility]. for information regarding prior to admission medications.    Discharged 10/1 (see discharge note for details)  Medication changes:  New bupropion  mg daily x3 days, then BID  New Methylprednisolone 4 mg BID x3 days  New Pregabalin 50 mg TID  New oxycodone PRN  New miralax every day  New Senna S BID PRN    Randi Cheema, PharmD   Clinical Pharmacist

## 2024-10-09 NOTE — DISCHARGE SUMMARY
"[facility]  Hospitalist Discharge Summary      Date of Admission:  10/8/2024  Date of Discharge:  10/9/2024  Discharging Provider: Vicky Torres MD  Discharge Service: Hospitalist Service, GOLD TEAM 17    Discharge Diagnoses   Severe Thoracic, Lumbar  spinal and foraminal stenosis  BL LE paresthesias, subjective weakness, radicular pain  Saddle anesthesia  Bladder incontinence  C/f urinary retention  H/o R sided sciatica  Conus medularis syndrome  H/o R foot amputation   Tobacco dependence:   Clinically Significant Risk Factors     # Severe Obesity: Estimated body mass index is 6,444.87 kg/m  as calculated from the following:    Height as of this encounter: 0.152 m (6\").    Weight as of 9/27/24: 149.7 kg (330 lb).       Follow-ups Needed After Discharge       Discharge Disposition   Discharged to home  Condition at discharge: Stable    Hospital Course    Mario Calderon is a 29 yo M with pmhx of R sided sciatica, s/p R foot amputation 2016, HTN, ADHD previously admitted on 9/27/2024 with BL LE numbness and radicular pain, loss of bladder control, saddle anesthesia, with concerns for conus medularis syndrome, consulted Neurosurgery service who recommended conservative management with possible surgical intervention as outpatient and presents back due to continued inability to ambulate .  Patient hospitalized overnight, was seen by neurosurgery on admission along with rounds.  There was no surgical intervention offered at this time.  They did reiterate previous plan to proceed with surgical intervention once patient has stopped smoking.  I also recommended outpatient referral physical and Occupational Therapy.  Patient is agreeable with discharging home.  With plans to follow-up with neurosurgery in the outpatient setting.     Severe Thoracic, Lumbar  spinal and foraminal stenosis  BL LE paresthesias, subjective weakness, radicular pain  Saddle anesthesia  Bladder " incontinence  C/f urinary retention  H/o R sided sciatica  Conus medularis syndrome  P/w sudden onset of BL LE numbness/ radicular pain/ subjective weakness, saddle anesthesia, loss of bladder control on morning of 9/27. Does have h/o R sided sciatica w/ pain down his R leg, but has never had BL symptoms and has never had this severity. Saw a chiropractor on 9/25 for sciatic pain-- no pain immediately after the appointment, but now w/ this presentation. Lumbar MRI demonstrated severe canal stenosis at L3-4/L4-5 w/ moderate stenosis at L5-S1; moderate R neural foraminal stenosis at L4-5 and severe L neural foraminal stenosis at L5-S1. Thoracic MRI w/ mild to moderate spinal canal stenosis at T11-L1, Presentation is suspicious for cauda equina, conus medullaris syndrome but no myelopathy on MRI imaging. Neurosurgery evaluation x 2 - recommendations are for a spinal fusion, after Mario has quit smoking for 3 mo and lost weight in the outpatient setting. Patient was discharged with plans for outpatient follow up, physical therapy, PMR for steroid injections but presents back due to continued inability to ambulate  - Evaluated by Neurosurgery service in the ER and no further surgical intervention indicated and recommending outpatient follow up as per prior plan  - neurochecks  - Bladder scan in ER negative for significant urinary retention. Improving constipation. Continue bowel regimen     # Pain control:   - continued uncontrolled pain. Patient does not want opiates due to risk of worsening constipation  - will increase Lyrica, continue tylenol and ibuprofen. Avoid opiates         H/o R foot amputation: Has prosthetic.      Tobacco dependence:  Goal is cessation.  Discussed options. Initiated wellbutrin SR, 150 mg daily x 3 days, and then increase to BID for 12 weeks at prior admission and continue 150 mg BID           Consultations This Hospital Stay   PHYSICAL THERAPY ADULT IP CONSULT  OCCUPATIONAL THERAPY ADULT IP  CONSULT    Code Status   Full Code    Time Spent on this Encounter   I, Vicky Torres MD, personally saw the patient today and spent greater than 30 minutes discharging this patient.       Vicky Torres MD  [facility] MED SURG ORTHOPEDIC  [address] [place] [id number]  Phone: [phone]  Fax: [phone]  ______________________________________________________________________    Physical Exam   Vital Signs: Temp: 97.4  F (36.3  C) Temp src: Oral BP: 127/85 Pulse: 80   Resp: 16 SpO2: 98 % O2 Device: None (Room air)    Weight: 0 lbs 0 oz  General Appearance: Appears comfortable.  Respiratory: Without wheezes rhonchi or rales.  CTA  Cardiovascular: RRR, without murmurs  GI: Soft, nontender, plus BS  Skin: Without obvious bleeding, bruising or excoriations         Primary Care Physician   [facility]    Discharge Orders      Reason for your hospital stay    LE weakness, back pain     Activity    Your activity upon discharge: activity as tolerated     Adult [facility] Follow-up and recommended labs and tests    Follow up with primary care provider, [facility], within 7 days to evaluate medication change and for hospital follow- up.  No follow up labs or test are needed.      Appointments on [place] and/or [facility] (with [facility] or [facility] provider or service). Call [phone] if you haven't heard regarding these appointments within 7 days of discharge.     Diet    Follow this diet upon discharge: Current Diet:Orders Placed This Encounter      Regular Diet Adult       Significant Results and Procedures   Results for orders placed or performed during the hospital encounter of 09/27/24   MR Lumbar Spine w/o Contrast    Narrative    MR LUMBAR SPINE W/O CONTRAST 9/27/2024 4:47 PM    Provided History: low back pain with bilateral leg numbness with  saddle numbness and urinary incontinence.    Comparison: CT same  day    Technique: Sagittal T1-weighted, sagittal STIR, sagittal  diffusion-weighted (with ADC map), axial T1-weighted, and 3D  volumetric axial, coronal, and sagittal reconstructed T2-weighted  images of the lumbar spine were obtained without intravenous contrast.      Findings: There are 5 lumbar-type vertebrae with partial sacralization  of L5.  The tip of the conus medullaris is at L1.  Minimal stepwise  grade 1 retrolisthesis at L3-4 and L4-5, unchanged. Mild to moderate  disc height narrowing from L3-S1.  Degenerative edema in the opposing  endplates at L4-5.    On a level by level basis:    T12-L1: No spinal canal or neuroforaminal stenosis.    L1-2: No spinal canal or neuroforaminal stenosis.    L2-3: Small posterior disc osteophyte complex results in mild spinal  canal stenosis. Mild neural foraminal narrowing bilaterally.    L3-4: Large centrally extruded posterior disc osteophyte complex  resulting in severe spinal canal stenosis. Mild left neural narrowing.  No right neural foraminal stenosis. Facet hypertrophy bilaterally.    L4-5: Large centrally extruded posterior disc osteophyte complex  resulting in moderate to severe spinal canal stenosis. Moderate right  and mild left neural foraminal narrowing. Facet hypertrophy  bilaterally.    L5-S1: Moderate posterior disc osteophyte complex contributing to  moderate spinal canal stenosis. Asymmetric left greater than right  facet hypertrophy with heterotopic bone formation resulting in severe  left neural foraminal stenosis. Mild neuroforaminal narrowing on the  right.    Paraspinous tissues are within normal limits.      Impression    Impression:   1. Redemonstration of the large extruded posterior disc osteophyte  complexes at L3-4 and L4-5 with resultant severe spinal canal  stenosis. Also moderate spinal canal stenoses at L5-S1 as a result of  a disc osteophyte complex.  2. Moderate right neural foraminal stenosis at L4-5 and severe left  neural foraminal  stenosis at L5-S1.      I have personally reviewed the examination and initial interpretation  and I agree with the findings.    LANIE LEDBETTER MD         SYSTEM ID:  [id number]   Thoracic spine MRI w/o contrast    Narrative    MR THORACIC SPINE W/O CONTRAST 9/27/2024 5:17 PM    Provided History: sudden bilateral leg numbness and saddle anesthesia  with urinary incontinence and retention    Comparison: Same day CT    Technique: Sagittal T1-weighted, sagittal T2-weighted, sagittal STIR,  and axial T2-weighted images of the thoracic spine were obtained  without the administration of intravenous contrast. The entire T2  axial sequence was not completed as patient was in too much pain to  finish the exam.    Findings:  T2 axial sequences only performed to the level of T6 due to patient  pain. The thoracic vertebral column is in normal alignment. Mild loss  of disc height at T4-5 and T11-12. Small posterior disc bulges and  protrusions from T4-5 through T8-9 along with endplate osteophytosis  with multilevel mild spinal canal narrowing. Moderate posterior disc  osteophyte complex at T11-12 results in mild to moderate spinal canal  stenosis and mild left neuroforaminal narrowing. Posterior disc  osteophyte complex at T12-L1 results in mild spinal canal stenosis and  mild right neural foraminal narrowing. No abnormal signal in the  thoracic spinal cord, to the extent visualized.        Impression    Impression:   Multilevel posterior disc osteophyte complexes, most pronounced at  T11-12 with mild to moderate spinal canal stenosis and at T12-L1 with  mild spinal canal stenosis. No high-grade neural foraminal stenosis.    I have personally reviewed the examination and initial interpretation  and I agree with the findings.    LANIE LEDBETTER MD         SYSTEM ID:  [id number]   CT Lumbar Spine w/o Contrast    Narrative    Thoracic and Lumbar spine CT without contrast    History: acute back pain.    Comparison: None    Technique:  Axial, coronal, and sagittal multiplanar reconstructions  obtained from acquisition of thoracic and lumbar spine CT scan without  contrast.    Findings:  Thoracic spine: There is no acute fracture or subluxation. There is no  prevertebral edema. There is no high-grade spinal canal stenosis at  any level. Disc osteophyte complexes resulting in mild spinal canal  narrowing at the T7-8 and T8-9 levels. Disc osteophyte complex at the  T11-12 space results in mild-to-moderate left neural foraminal  narrowing and probable mild spinal canal narrowing. Small disc  osteophyte complex at the T12-L1 level results in mild right neural  foraminal and mild spinal canal narrowing.No soft tissue abnormality  in the visualized paraspinous tissues anteriorly.    Lumbar Spine: There is no acute fracture or subluxation. There are 4  lumbar type vertebra,, with partial sacralization of the L5 vertebral  segment.   Multilevel disc space narrowing is greatest at the L3-4, L4-5 and  L5-S1 levels. There is ankylosis of the posterior elements of L5 and  S1 on the left.    On a level by level basis, the findings are as follows:    L1-2:  There is no focal abnormality.  L2-3:  Disc osteophyte complex with mild spinal canal stenosis. Mild  right neural foraminal narrowing. No significant left neural foraminal  narrowing.  L3-4:  Large disc osteophyte complex resulting in severe spinal canal  stenosis. Mild to moderate right and mild left left neural foraminal  narrowing. Mild facet arthropathy.  L4-5:  Large disc osteophyte complex resulting in severe spinal canal  stenosis. Moderate right and mild left neural foraminal narrowing.  L5-S1:  Large disc osteophyte complex resulting in moderate severe  spinal canal stenosis. Mild right neural foraminal narrowing. Severe  left neural foraminal narrowing secondary to heterotopic bone  formation and severe facet arthrosis. Partially sacralized left L5  segment.    The visualized paraspinous tissues  anteriorly are unremarkable.      Impression    Impression:   1. Large disc osteophyte complexes at the L3-4 and L4-5 levels result  in severe spinal canal stenosis. Additionally, there is moderate to  severe narrowing at the L5-S1 level secondary to a disc osteophyte  complex.  2. Multilevel neural foraminal narrowing is greatest at the left L5-S1  neural foramina where there is severe narrowing secondary to severe  facet arthropathy, heterotopic bone formation and a partially  sacralized left L5 segment.  3. Mild thoracic spondylosis, as detailed above, without high-grade  spinal canal or neural foraminal narrowing.    I have personally reviewed the examination and initial interpretation  and I agree with the findings.    REGI SEWELL MD         SYSTEM ID:  [id number]   CT Thoracic Spine w/o Contrast    Narrative    Thoracic and Lumbar spine CT without contrast    History: acute back pain.    Comparison: None    Technique: Axial, coronal, and sagittal multiplanar reconstructions  obtained from acquisition of thoracic and lumbar spine CT scan without  contrast.    Findings:  Thoracic spine: There is no acute fracture or subluxation. There is no  prevertebral edema. There is no high-grade spinal canal stenosis at  any level. Disc osteophyte complexes resulting in mild spinal canal  narrowing at the T7-8 and T8-9 levels. Disc osteophyte complex at the  T11-12 space results in mild-to-moderate left neural foraminal  narrowing and probable mild spinal canal narrowing. Small disc  osteophyte complex at the T12-L1 level results in mild right neural  foraminal and mild spinal canal narrowing.No soft tissue abnormality  in the visualized paraspinous tissues anteriorly.    Lumbar Spine: There is no acute fracture or subluxation. There are 4  lumbar type vertebra,, with partial sacralization of the L5 vertebral  segment.   Multilevel disc space narrowing is greatest at the L3-4, L4-5 and  L5-S1 levels. There is ankylosis  of the posterior elements of L5 and  S1 on the left.    On a level by level basis, the findings are as follows:    L1-2:  There is no focal abnormality.  L2-3:  Disc osteophyte complex with mild spinal canal stenosis. Mild  right neural foraminal narrowing. No significant left neural foraminal  narrowing.  L3-4:  Large disc osteophyte complex resulting in severe spinal canal  stenosis. Mild to moderate right and mild left left neural foraminal  narrowing. Mild facet arthropathy.  L4-5:  Large disc osteophyte complex resulting in severe spinal canal  stenosis. Moderate right and mild left neural foraminal narrowing.  L5-S1:  Large disc osteophyte complex resulting in moderate severe  spinal canal stenosis. Mild right neural foraminal narrowing. Severe  left neural foraminal narrowing secondary to heterotopic bone  formation and severe facet arthrosis. Partially sacralized left L5  segment.    The visualized paraspinous tissues anteriorly are unremarkable.      Impression    Impression:   1. Large disc osteophyte complexes at the L3-4 and L4-5 levels result  in severe spinal canal stenosis. Additionally, there is moderate to  severe narrowing at the L5-S1 level secondary to a disc osteophyte  complex.  2. Multilevel neural foraminal narrowing is greatest at the left L5-S1  neural foramina where there is severe narrowing secondary to severe  facet arthropathy, heterotopic bone formation and a partially  sacralized left L5 segment.  3. Mild thoracic spondylosis, as detailed above, without high-grade  spinal canal or neural foraminal narrowing.    I have personally reviewed the examination and initial interpretation  and I agree with the findings.    REGI SEWELL MD         SYSTEM ID:  [id number]   MR Thoracic Spine w/o & w Contrast    Narrative    MR THORACIC SPINE W/O & W CONTRAST 9/28/2024 5:00 PM    Provided History: pt with bladder incontinence, low back pain, LE  numbess, did not finish recent thoracic spine  MRI,NSGY needing for  management plan  ICD-10:    Comparison: MRI thoracic spine 9/27/2024.    Technique:  Sagittal T1-weighted, sagittal T2-weighted, sagittal STIR, sagittal  diffusion weighted, and axial T2-weighted images through the thoracic  spine were obtained without intravenous contrast. Following  intravenous administration of gadolinium, axial and sagittal  T1-weighted images with fat saturation were also obtained.    Contrast: IV Gadavist    Findings:  Normal thoracic vertebral alignment.  And axial T2-weighted images are  degraded by patient motion, however there is no definite cord signal  abnormality. Mild loss of disc height at C4-5 and T11-12. Multiple  small posterior disc bulges and protrusions from T4-T9 and T11-L1  contributing to multilevel mild to moderate spinal canal stenosis.  Largest disc protrusion at T11-12 in the left subarticular zone.  Multilevel thoracic facet hypertrophy. Mild neural foraminal narrowing  on the left at T10-11 and T11-12.    No abnormal contrast enhancement within the thoracic spinal cord,  thecal sac or vertebral column.      Impression    Impression:  1. Multilevel thoracic spondylosis with mild to moderate spinal canal  stenosis, most pronounced at T11-12.  2. No definite myelopathic cord signal.    LANIE LEDBETTER MD         SYSTEM ID:  [id number]   US Bladder Only    Narrative    EXAMINATION: US BLADDER ONLY, 10/1/2024 3:34 PM     COMPARISON: None.    HISTORY: Suspect bladder outlet obstruction    FINDINGS: The bladder is well distended and measures 7.3 x 5.5 x 6.8  cm with 140 mL volume. There is no wall thickening or trabeculation.      Impression    IMPRESSION:  1.  Normal appearing bladder with 140 mL volume.    I have personally reviewed the examination and initial interpretation  and I agree with the findings.    COMFORT AMOS DO         SYSTEM ID:  [id number]       Discharge Medications   Current Discharge Medication List        CONTINUE these medications which  have NOT CHANGED    Details   oxyCODONE IR (ROXICODONE) 10 MG tablet Take 1 tablet (10 mg) by mouth every 4 hours as needed for severe pain.  Qty: 20 tablet, Refills: 0    Associated Diagnoses: Lumbar radiculopathy      polyethylene glycol (MIRALAX) 17 GM/Dose powder Take 17 g by mouth daily.    Associated Diagnoses: Lumbar radiculopathy      pregabalin (LYRICA) 50 MG capsule Take 1 capsule (50 mg) by mouth 3 times daily.  Qty: 90 capsule, Refills: 0    Associated Diagnoses: Lumbar radiculopathy      SENNA-docusate sodium (SENNA S) 8.6-50 MG tablet Take 1 tablet by mouth 2 times daily as needed (Constipation).  Qty: 30 tablet, Refills: 3    Associated Diagnoses: Lumbar radiculopathy           STOP taking these medications       acetaminophen (TYLENOL) 325 MG tablet Comments:   Reason for Stopping:         buPROPion (WELLBUTRIN SR) 150 MG 12 hr tablet Comments:   Reason for Stopping:         ibuprofen (ADVIL/MOTRIN) 800 MG tablet Comments:   Reason for Stopping:             Allergies   No Known Allergies

## 2024-10-10 ENCOUNTER — PATIENT OUTREACH (OUTPATIENT)
Dept: CARE COORDINATION | Facility: CLINIC | Age: 29
End: 2024-10-10
Payer: COMMERCIAL

## 2024-10-10 NOTE — PLAN OF CARE
Physical Therapy Discharge Summary    Reason for therapy discharge:    Discharged to home.    Progress towards therapy goal(s). See goals on Care Plan in [facility] electronic health record for goal details.  Goals not met.  Barriers to achieving goals:   discharge from facility.    Therapy recommendation(s):    No further therapy is recommended.

## 2024-10-10 NOTE — PLAN OF CARE
Goal Outcome Evaluation:      Plan of Care Reviewed With: patient    Overall Patient Progress: improvingOverall Patient Progress: improving    Outcome Evaluation: Pt is A&Ox4. Pt transfers with 1A, GB, and FWW. Pt does self transfer to wheelchair. Pain was manage with PRN Oxycodone and Ibuprofen. Pt is able to make needs known, call light within reach. Pt discharged at 1830.

## 2024-10-10 NOTE — PROGRESS NOTES
"Clinic Care Coordination Contact  Transitions of Care Outreach  Chief Complaint   Patient presents with    Clinic Care Coordination - Post Hospital       Most Recent Admission Date: 10/8/2024   Most Recent Admission Diagnosis: Left leg weakness - R29.898     Most Recent Discharge Date: 10/9/2024   Most Recent Discharge Diagnosis: Left leg weakness - R29.898  Lumbar radiculopathy - M54.16     Transitions of Care Assessment    Discharge Assessment  How are you doing now that you are home?: \" Doing better \"  How are your symptoms? (Red Flag symptoms escalate to triage hotline per guidelines): Improved  Do you know how to contact your clinic care team if you have future questions or changes to your health status? : Yes  Does the patient have their discharge instructions? : Yes  Does the patient have questions regarding their discharge instructions? : No  Were you started on any new medications or were there changes to any of your previous medications? : Yes  Does the patient have all of their medications?: Yes  Do you have questions regarding any of your medications? : No  Do you have all of your needed medical supplies or equipment (DME)?  (i.e. oxygen tank, CPAP, cane, etc.): Yes    Post-op ([facility] CTA Only)  If the patient had a surgery or procedure, do they have any questions for a nurse?: No         [facility] Explained and offered Care Coordination support to eligible patients: Yes    Patient accepted? No    Follow up Plan     Discharge Follow-Up  Discharge follow up appointment scheduled in alignment with recommended follow up timeframe or Transitions of Risk Category? (Low = within 30 days; Moderate= within 14 days; High= within 7 days): No    Future Appointments   Date Time Provider Department Center   10/18/2024  2:10 PM Mario Garcia PT [facility]   10/21/2024  2:10 PM Mario Garcia PT [facility]   10/25/2024  2:10 PM Mario Garcia PT [facility]   10/28/2024  1:30 PM Mario Garcia PT " [facility]   10/30/2024  1:30 PM Mario Garcia, PT [facility]   11/4/2024  1:30 PM Mario Garcia, PT [facility]   11/6/2024 12:45 PM Nancy Lange, OT [facility]   11/8/2024  1:30 PM Mario Garcia, PT [facility]   11/20/2024  1:30 PM Mario Garcia, PT [facility]   11/22/2024  1:30 PM Mario Garcia, PT [facility]   11/25/2024  1:30 PM Mario Garcia, PT [facility]   11/27/2024  1:30 PM Mario Garcia, PT [facility]       Outpatient Plan as outlined on AVS reviewed with patient.    For any urgent concerns, please contact our 24 hour nurse triage line: [phone] ([phone])       Mei Jeffers MA

## 2024-10-10 NOTE — DISCHARGE SUMMARY
DISCHARGE SUMMARY    Pt discharging to: home  Transportation: uber  AVS given and discussed: Pt was given AVS and pt states understanding of content. Pt has no further questions.   Stoplight Tool given and discussed: n/a  Medications given: Yes, discussed. No further questions.   Belongings returned: Yes, ensured all belongings packed and sent with pt. No items in security.   Comments: Escorted safely to elevators. Pt left at 1830.

## 2024-10-16 ENCOUNTER — TELEPHONE (OUTPATIENT)
Dept: FAMILY MEDICINE | Facility: CLINIC | Age: 29
End: 2024-10-16
Payer: COMMERCIAL

## 2024-10-16 NOTE — TELEPHONE ENCOUNTER
FYI - Status Update    Who is Calling: patient    Update: patient states they will be faxing disability paper work , patient would like a call when pcp receive forms.     Does caller want a call/response back: Yes     Could we send this information to you in [employer] or would you prefer to receive a phone call?:   Patient would prefer a phone call   Okay to leave a detailed message?: Yes at Cell number on file:    Telephone Information:   Mobile [phone]

## 2024-10-16 NOTE — LETTER
October 25, 2024      Mario Calderon  [address]  [place] [address]      To Whom It May Concern:     Mario Calderon  was seen on 10/21/24 and FMLA forms were completed at that time. Please excuse him until 4/1/25 due to injury. If he is able to go back to work sooner based on neurosurgery's input, we will provide him with a clearance letter to do so.     Sincerely,           BROCK Avila CNP

## 2024-10-17 NOTE — TELEPHONE ENCOUNTER
Forms/Letter Request    Type of form/letter: FMLA - Unknown      Is Release of Information needed?: Yes  Was an NOBLE obtained?  No -TC is placing an NOBLE at the  for the pt to sign so forms can be faxed.    Do we have the form/letter: Yes: Placed in MD's box    Who is the form from? Patient    Where did/will the form come from? form was faxed in    When is form/letter needed by: 5-7 business days    How would you like the form/letter returned: Fax : [phone]    Patient Notified form requests are processed in 5-7 business days:Yes    Could we send this information to you in [employer] or would you prefer to receive a phone call?:   Patient would like to be contacted via [employer]

## 2024-10-17 NOTE — TELEPHONE ENCOUNTER
Pt has an upcoming appt on 10/21 for a Hosp follow and establish care with Brook Eubanks.  Forms will be placed in Brook's box the day of the appt as he is requesting she be his PCP.  Pt will also sign the NOBLE the day of his appt. The NOBLE is attached to the FMLA forms.

## 2024-10-21 ENCOUNTER — OFFICE VISIT (OUTPATIENT)
Dept: FAMILY MEDICINE | Facility: CLINIC | Age: 29
End: 2024-10-21
Payer: COMMERCIAL

## 2024-10-21 VITALS
HEART RATE: 89 BPM | RESPIRATION RATE: 16 BRPM | DIASTOLIC BLOOD PRESSURE: 86 MMHG | OXYGEN SATURATION: 98 % | SYSTOLIC BLOOD PRESSURE: 123 MMHG | TEMPERATURE: 98.1 F

## 2024-10-21 DIAGNOSIS — E11.9 TYPE 2 DIABETES MELLITUS WITHOUT COMPLICATION, WITHOUT LONG-TERM CURRENT USE OF INSULIN (H): ICD-10-CM

## 2024-10-21 DIAGNOSIS — J45.20 MILD INTERMITTENT ASTHMA WITHOUT COMPLICATION: ICD-10-CM

## 2024-10-21 DIAGNOSIS — F17.200 NICOTINE DEPENDENCE, UNCOMPLICATED, UNSPECIFIED NICOTINE PRODUCT TYPE: ICD-10-CM

## 2024-10-21 DIAGNOSIS — M54.16 LUMBAR RADICULOPATHY: Primary | ICD-10-CM

## 2024-10-21 DIAGNOSIS — I10 BENIGN ESSENTIAL HYPERTENSION: ICD-10-CM

## 2024-10-21 PROCEDURE — 99214 OFFICE O/P EST MOD 30 MIN: CPT | Mod: 25

## 2024-10-21 PROCEDURE — 90656 IIV3 VACC NO PRSV 0.5 ML IM: CPT

## 2024-10-21 PROCEDURE — 90471 IMMUNIZATION ADMIN: CPT

## 2024-10-21 RX ORDER — LOSARTAN POTASSIUM 25 MG/1
25 TABLET ORAL DAILY
Qty: 90 TABLET | Refills: 3 | Status: SHIPPED | OUTPATIENT
Start: 2024-10-21

## 2024-10-21 RX ORDER — IBUPROFEN 800 MG/1
800 TABLET, FILM COATED ORAL EVERY 8 HOURS PRN
Qty: 90 TABLET | Refills: 1 | Status: SHIPPED | OUTPATIENT
Start: 2024-10-21

## 2024-10-21 RX ORDER — LEVALBUTEROL TARTRATE 45 UG/1
2 AEROSOL, METERED ORAL EVERY 4 HOURS PRN
Qty: 15 G | Refills: 2 | Status: SHIPPED | OUTPATIENT
Start: 2024-10-21

## 2024-10-21 RX ORDER — DIAPER,BRIEF,ADULT, DISPOSABLE
EACH MISCELLANEOUS
Qty: 36 EACH | Refills: 4 | Status: SHIPPED | OUTPATIENT
Start: 2024-10-21

## 2024-10-21 RX ORDER — SENNA AND DOCUSATE SODIUM 50; 8.6 MG/1; MG/1
1 TABLET, FILM COATED ORAL 2 TIMES DAILY PRN
Qty: 30 TABLET | Refills: 3 | Status: SHIPPED | OUTPATIENT
Start: 2024-10-21

## 2024-10-21 RX ORDER — OXYCODONE HYDROCHLORIDE 10 MG/1
10 TABLET ORAL EVERY 4 HOURS PRN
Qty: 10 TABLET | Refills: 0 | Status: SHIPPED | OUTPATIENT
Start: 2024-10-21

## 2024-10-21 RX ORDER — POLYETHYLENE GLYCOL 3350 17 G/17G
17 POWDER, FOR SOLUTION ORAL DAILY
Qty: 850 G | Refills: 1 | Status: SHIPPED | OUTPATIENT
Start: 2024-10-21

## 2024-10-21 NOTE — PROGRESS NOTES
"  Assessment & Plan     Lumbar radiculopathy  - Reviewed ED and hospital course, neurosurgery plan, and current symptoms. He remains in significant pain, especially at night.   - SENNA-docusate sodium (SENNA S) 8.6-50 MG tablet  Dispense: 30 tablet; Refill: 3  - polyethylene glycol (MIRALAX) 17 GM/Dose powder  Dispense: 850 g; Refill: 1  - oxyCODONE IR (ROXICODONE) 10 MG tablet  Dispense: 10 tablet; Refill: 0  - Adult Physical Medicine and Rehab  Referral  - ibuprofen (ADVIL/MOTRIN) 800 MG tablet  Dispense: 90 tablet; Refill: 1  - Incontinence Supply Disposable (SLIMLINE DISPOSABLE BRIEFS XL) MISC  Dispense: 36 each; Refill: 4    Mild intermittent asthma without complication  - Uses inhaler infrequently. Refilled.  - levalbuterol (XOPENEX HFA) 45 MCG/ACT inhaler  Dispense: 15 g; Refill: 2    Benign essential hypertension  - Previously on medication, above goal today. Start losartan. Follow up scheduled.  - losartan (COZAAR) 25 MG tablet  Dispense: 90 tablet; Refill: 3    Type 2 diabetes mellitus without complication, without long-term current use of insulin (H)  - Reviewed previous records. Due for labs and diabetes follow up.    Nicotine dependence, uncomplicated, unspecified nicotine product type  - Per neurosurgery needs to quit smoking to be a surgical candidate. Referral for support provided.   - [place] Quit Partner Referral    MED REC REQUIRED{  Post Medication Reconciliation Status:  Discharge medications reconciled, continue medications without change  BMI  Estimated body mass index is 6,444.87 kg/m  as calculated from the following:    Height as of 10/8/24: 0.152 m (6\").    Weight as of 9/27/24: 149.7 kg (330 lb).   Weight management plan: Discussed healthy diet and exercise guidelines    Due for annual physical and diabetes check, scheduled, otherwise follow up on as-needed basis.    Whit Martin is a 28 year old, presenting for the following health issues:  Hospital F/U and Establish Care       "  10/21/2024     3:08 PM   Additional Questions   Roomed by sina   Accompanied by tenaCecile huerta         10/21/2024     3:08 PM   Patient Reported Additional Medications   Patient reports taking the following new medications no     HPI      Hospital Follow-up Visit:    Hospital/Nursing Home/IP Rehab Facility:  [facility]   Date of Admission: 10/08/2024  Date of Discharge: 10/09/2024  Reason(s) for Admission: constipation, unable to have a bowel movement for 4 weeks   Was the patient in the ICU or did the patient experience delirium during hospitalization?  No  Do you have any other stressors you would like to discuss with your provider? No    Problems taking medications regularly:  None  Medication changes since discharge: None  Problems adhering to non-medication therapy:  None    Summary of hospitalization:  [facility] discharge summary reviewed  Diagnostic Tests/Treatments reviewed.  Follow up needed: none  Other Healthcare Providers Involved in Patient s Care:         Specialist appointment - Following with neurosurgery and physical therapy  Update since discharge: improved. Continues to have leg numbness and weakness, sciatic nerve pain, urine retention and constipation. Enema helpful in the hospital.     Plan of care communicated with patient    From ED visit 10/8/24:   Mario Calderon is a 29 yo M with pmhx of R sided sciatica, s/p R foot amputation 2016, HTN, ADHD previously admitted on 9/27/2024 with BL LE numbness and radicular pain, loss of bladder control, saddle anesthesia, with concerns for conus medularis syndrome, consulted Neurosurgery service. Does have h/o R sided sciatica w/ pain down his R leg, but has never had BL symptoms and has never had this severity. Saw a chiropractor on 9/25 for sciatic pain-- no pain immediately after the appointment, but now w/ this presentation.  Lumbar MRI demonstrated severe canal stenosis at L3-4/L4-5 w/  moderate stenosis at L5-S1; moderate R neural foraminal stenosis at L4-5 and severe L neural foraminal stenosis at L5-S1. Thoracic MRI w/ mild to moderate spinal canal stenosis at T11-L1, Presentation is suspicious for cauda equina, conus medullaris syndrome but no myelopathy on MRI imaging. Neurosurgery evaluation x 2 - recommendations are for a spinal fusion, after Mario has quit smoking for 3 mo and lost weight in the outpatient setting. Patient was discharged with plans for outpatient follow up, physical therapy, PMR for steroid injections but presents back due to continued inability to ambulate. Does not report change in his saddle anesthesia, difficulty with urination and bowel incontinence. Bladder scan in ER negative for significant urinary retention.     ED visit 10/18 for constipation, given enema, prescribed miralax and metamucil, improving but continues to have retention and constipation    Review of Systems  Constitutional, HEENT, cardiovascular, pulmonary, gi and gu systems are negative, except as otherwise noted.      Objective    /86 (BP Location: Right arm, Patient Position: Sitting, Cuff Size: Adult Large)   Pulse 89   Temp 98.1  F (36.7  C) (Oral)   Resp 16   SpO2 98%   There is no height or weight on file to calculate BMI.    Physical Exam   GENERAL: alert and no distress  NECK: no adenopathy, no asymmetry, masses, or scars  RESP: lungs clear to auscultation - no rales, rhonchi or wheezes  CV: regular rate and rhythm, normal S1 S2, no S3 or S4, no murmur, click or rub, no peripheral edema  ABDOMEN: soft, nontender, no hepatosplenomegaly, no masses and bowel sounds normal  MS: Seated in wheelchair. Low back pain, numbness and weakness in bilateral LE.       Signed Electronically by: BROCK Avila CNP

## 2024-10-21 NOTE — PATIENT INSTRUCTIONS
At [facility], we strive to deliver an exceptional experience to you, every time we see you. If you receive a survey, please let us know what we are doing well and/or what we could improve upon, as we do value your feedback.  If you have MyChart, you can expect to receive results automatically within 24 hours of their completion.  Your provider will send a note interpreting your results as well.   If you do not have MyChart, you should receive your results in about a week by mail.    Your care team:                            Family Medicine Internal Medicine   MD Ghassan Abdullahi, MD Bev Wilson, MD Lefty Villar, MD Soumya Baig, PACecileC    Maxx Rosa, MD Pediatrics   Yanelis Diggs, MD Ariella Billingsley, MD Christi Fitzpatrick, APRN CNP Alexandria Farmer APRN CNP   MD Gema Lewis, MD Brook Eubanks, CNP     Perfecto Wolff, CNP Same-Day Provider (No follow-up visits)   BROCK Szymanski, DNP Klapana Mojica, BROCK Driscoll, FNP, BC MATILDE ThomasC     Clinic hours: Monday - Thursday 7 am-6 pm; Fridays 7 am-5 pm.   Urgent care: Monday - Friday 10 am- 8 pm; Saturday and Sunday 9 am-5 pm.    Clinic: [phone]       [facility]: Monday - Thursday 8 am - 7 pm; Friday 8 am - 6 pm  [facility]: [phone]

## 2024-10-22 NOTE — TELEPHONE ENCOUNTER
Received provider signed forms. Marked missing signature. Placed back in Borok Eubanks's box.  Abigail England MA  [facility]   Primary Care

## 2024-10-23 NOTE — TELEPHONE ENCOUNTER
Forms found in TC bin. Form faxed to [employer] [phone] on 10/23/24 at 10:10am. Copy placed in abstract and original in tc copy bin.     NOBLE in abstract and a copy in tc bin.

## 2024-10-24 ENCOUNTER — MYC MEDICAL ADVICE (OUTPATIENT)
Dept: FAMILY MEDICINE | Facility: CLINIC | Age: 29
End: 2024-10-24

## 2024-10-24 NOTE — LETTER
October 24, 2024      Mario Calderon  [id number]  [place] [address]        To Whom It May Concern:    Mario Calderon  was seen on 10/21/24 and FMLA forms were completed at that time. Please excuse him until 4/1/24 due to injury. If he is able to go back to work sooner based on neurosurgery's input, we will provide him with a clearance letter to do so.    Sincerely,        BROCK Avila CNP

## 2024-10-24 NOTE — TELEPHONE ENCOUNTER
The forms have been placed back in your box.  Please route them back to  for faxing once complete.  Thank you!

## 2024-10-25 ENCOUNTER — THERAPY VISIT (OUTPATIENT)
Dept: PHYSICAL THERAPY | Facility: CLINIC | Age: 29
End: 2024-10-25
Payer: COMMERCIAL

## 2024-10-25 DIAGNOSIS — G89.29 CHRONIC MIDLINE LOW BACK PAIN WITH BILATERAL SCIATICA: ICD-10-CM

## 2024-10-25 DIAGNOSIS — M54.42 CHRONIC MIDLINE LOW BACK PAIN WITH BILATERAL SCIATICA: ICD-10-CM

## 2024-10-25 DIAGNOSIS — M54.41 CHRONIC MIDLINE LOW BACK PAIN WITH BILATERAL SCIATICA: ICD-10-CM

## 2024-10-25 DIAGNOSIS — R20.0 BILATERAL LEG NUMBNESS: Primary | ICD-10-CM

## 2024-10-25 PROCEDURE — 97110 THERAPEUTIC EXERCISES: CPT | Mod: GP

## 2024-10-25 NOTE — TELEPHONE ENCOUNTER
Form faxed to [employer] [phone] on 10/25/24 at 1:50pm. Copy placed in abstract and original placed back in TC copy bin under 10/25/24.    Sent patient mychart message so he is aware letter has been updated as well as form.

## 2024-10-25 NOTE — TELEPHONE ENCOUNTER
Forms placed back in provider bin to update.     Please rewrite date and initial and date all changes. Please look at workability letter date also needs to state 04/01/2025.    Leslie Ochoa

## 2024-10-28 ENCOUNTER — MYC MEDICAL ADVICE (OUTPATIENT)
Dept: FAMILY MEDICINE | Facility: CLINIC | Age: 29
End: 2024-10-28

## 2024-10-28 ENCOUNTER — THERAPY VISIT (OUTPATIENT)
Dept: PHYSICAL THERAPY | Facility: CLINIC | Age: 29
End: 2024-10-28
Payer: COMMERCIAL

## 2024-10-28 DIAGNOSIS — G47.01 INSOMNIA DUE TO MEDICAL CONDITION: Primary | ICD-10-CM

## 2024-10-28 DIAGNOSIS — G89.29 CHRONIC MIDLINE LOW BACK PAIN WITH BILATERAL SCIATICA: ICD-10-CM

## 2024-10-28 DIAGNOSIS — M54.41 CHRONIC MIDLINE LOW BACK PAIN WITH BILATERAL SCIATICA: ICD-10-CM

## 2024-10-28 DIAGNOSIS — M54.42 CHRONIC MIDLINE LOW BACK PAIN WITH BILATERAL SCIATICA: ICD-10-CM

## 2024-10-28 DIAGNOSIS — R20.0 BILATERAL LEG NUMBNESS: Primary | ICD-10-CM

## 2024-10-28 PROCEDURE — 97110 THERAPEUTIC EXERCISES: CPT | Mod: GP

## 2024-10-28 RX ORDER — HYDROXYZINE HYDROCHLORIDE 25 MG/1
25-50 TABLET, FILM COATED ORAL
Qty: 90 TABLET | Refills: 0 | Status: SHIPPED | OUTPATIENT
Start: 2024-10-28

## 2024-10-28 NOTE — TELEPHONE ENCOUNTER
Routing to provider to further advise,    Pt was just seen on 10/21.  Visit note not yet completed.      Jayashree Barlow, SILVERN, RN  [facility]

## 2024-10-30 ENCOUNTER — THERAPY VISIT (OUTPATIENT)
Dept: PHYSICAL THERAPY | Facility: CLINIC | Age: 29
End: 2024-10-30
Payer: COMMERCIAL

## 2024-10-30 DIAGNOSIS — M54.41 CHRONIC MIDLINE LOW BACK PAIN WITH BILATERAL SCIATICA: ICD-10-CM

## 2024-10-30 DIAGNOSIS — M54.42 CHRONIC MIDLINE LOW BACK PAIN WITH BILATERAL SCIATICA: ICD-10-CM

## 2024-10-30 DIAGNOSIS — R20.0 BILATERAL LEG NUMBNESS: Primary | ICD-10-CM

## 2024-10-30 DIAGNOSIS — G89.29 CHRONIC MIDLINE LOW BACK PAIN WITH BILATERAL SCIATICA: ICD-10-CM

## 2024-10-30 PROCEDURE — 97110 THERAPEUTIC EXERCISES: CPT | Mod: GP

## 2024-11-07 ENCOUNTER — TELEPHONE (OUTPATIENT)
Dept: NEUROSURGERY | Facility: CLINIC | Age: 29
End: 2024-11-07
Payer: COMMERCIAL

## 2024-11-07 NOTE — TELEPHONE ENCOUNTER
----- Message from Celestine Ames sent at 2024  4:22 PM CST -----  Regardin2024 patient is wrongly scheduled  This should go to the neurosurgery team.  See the precharting note.  Thanks, Celestine

## 2024-11-08 ENCOUNTER — THERAPY VISIT (OUTPATIENT)
Dept: PHYSICAL THERAPY | Facility: CLINIC | Age: 29
End: 2024-11-08
Payer: COMMERCIAL

## 2024-11-08 DIAGNOSIS — G89.29 CHRONIC MIDLINE LOW BACK PAIN WITH BILATERAL SCIATICA: ICD-10-CM

## 2024-11-08 DIAGNOSIS — R20.0 BILATERAL LEG NUMBNESS: Primary | ICD-10-CM

## 2024-11-08 DIAGNOSIS — M54.41 CHRONIC MIDLINE LOW BACK PAIN WITH BILATERAL SCIATICA: ICD-10-CM

## 2024-11-08 DIAGNOSIS — M54.42 CHRONIC MIDLINE LOW BACK PAIN WITH BILATERAL SCIATICA: ICD-10-CM

## 2024-11-08 PROCEDURE — 97110 THERAPEUTIC EXERCISES: CPT | Mod: GP

## 2024-11-08 NOTE — PROGRESS NOTES
11/08/24 0500   Appointment Info   Signing clinician's name / credentials Mario Jose, PT, DPT, CSCS, CLT   Total/Authorized Visits E&T   Visits Used 5   Medical Diagnosis Chronic Midline Low Back Pain with Bilateral Sciatica   PT Tx Diagnosis Bilateral Low Back Pain with bilateral Sciatica   Progress Note/Certification   Start of Care Date 10/05/24   Onset of illness/injury or Date of Surgery 09/27/24   Therapy Frequency 2x a week   Predicted Duration 12 weeks   Certification date from 10/05/24   Certification date to 12/28/24   Progress Note Due Date 12/28/24   Progress Note Completed Date 10/05/24   GOALS   PT Goals 2;3   PT Goal 1   Goal Identifier Standing and Walking   Goal Description Mario will be able to stand for 10 minutes or walk for 5 minutes without assistance and without back or leg pain worse than 4/10 that does not radiate below the knees   Rationale to maximize safety and independence with performance of ADLs and functional tasks;to maximize safety and independence within the home;to maximize safety and independence within the community;to maximize safety and independence with transportation;to maximize safety and independence with self cares   Goal Progress Standing and walking unassisted is still a challenge due to significant right hip and leg weakness   Target Date 11/02/24   PT Goal 2   Goal Identifier Return to Work   Goal Description Mario will be able to return work with lifting restrictions for 4 hours a day without increased back pain that does not radiate beyond his knee and is not worse than 5/10   Rationale to maximize safety and independence with performance of ADLs and functional tasks;to maximize safety and independence within the home;to maximize safety and independence within the community;to maximize safety and independence with transportation;to maximize safety and independence with self cares   Goal Progress Standing and walking unassisted is still a challenge due to  significant right hip and leg weakness   Target Date 12/28/24   PT Goal 3   Goal Identifier Supine and Prone lying   Goal Description Mario will tolerate either prone lying or supine lying without increased hip, leg or foot symptoms worse than 6/10 after lying for 5 minutes   Rationale to maximize safety and independence with performance of ADLs and functional tasks;to maximize safety and independence within the home;to maximize safety and independence within the community;to maximize safety and independence with transportation;to maximize safety and independence with self cares   Goal Progress Initially prone lying would radiate down to his feet bilaterally with 7-8/10 pain.  Now he can lay in prone with pillows under his legs with no more than 4/10 in the foot   Target Date 11/02/24   Date Met 11/08/24   Subjective Report   Subjective Report Minimal change this week with numbness remaining constant in the right leg and right foot.  He reports Dr. Ames contacted him and said that he was not the best MD to work with him, and instead Mario has now been set up with Neurosurgeon Dr. Pryor.   Objective Measures   Objective Measures Objective Measure 1;Objective Measure 2   Objective Measure 1   Objective Measure Directional Preference   Details Prone lying with extension principles   Objective Measure 2   Objective Measure Right Leg MMT   Details Right Hip Abduction 2/5, Right Hip Extension 2/5, Right Knee Extension, 3/5, Right Knee Flexion 3/5   Treatment Interventions (PT)   Interventions Therapeutic Procedure/Exercise   Therapeutic Procedure/Exercise   Therapeutic Procedures: strength, endurance, ROM, flexibility minutes (42164) 65   Therapeutic Procedures Ther Proc 2;Ther Proc 3;Ther Proc 4;Ther Proc 5   Ther Proc 1 Bike   Ther Proc 1 - Details x9 minutes at level 2 resistance   Ther Proc 4 Directional Preference   Ther Proc 4 - Details Prone Lying with 4 pillows x90 seconds (No effect / No different) , Prone  lying with 3 pillows x2 minutes (No Change / No Different), 2 pillows x2 minutes (No change and no different), 1 pillow x1 minute (No change / No Different),  Prone Lying without a pillow x3 minutes (No better / no different / no effect), Prone lying with pillow under chest for slightly increased extension (Worse and peripheralized initially, but believes it started to centralize afterwards), Prone lying with 2 pillows underneath chest for increased lumbar extension (Worse initially with more tingling in the foot and in calf) x2 minutes, Prone lying x3 pillows x1 minute (Worse in the foot and calf.  Too much pressure). Reduced down to 1 pillow under chest in prone with 3 pillows under legs x5 minutes (BEtter and centralized. Pain reduced to 4/10).  Then tried prone with 2 pillows under his chest and two under his legs (Reduced to 4/10 in the foot but felt more pain in the back)   PTRx Ther Proc 1 All 4s Cat Cow   PTRx Ther Proc 1 - Details 1x10 each direction   PTRx Ther Proc 2 All 4s Leg Lift   PTRx Ther Proc 2 - Details 1x15 left leg and 1x3 with PT assistance right leg   PTRx Ther Proc 3 Hip AROM Standing Extension   PTRx Ther Proc 3 - Details 1x18 left leg and 1x15 right leg    PTRx Ther Proc 4 Shoulder Theraband Rows   PTRx Ther Proc 4 - Details Deferred but put major emphasis on directional preference today. He regularly does these at home   PTRx Ther Proc 5 Shoulder Theraband Low Row/Pulldown   PTRx Ther Proc 5 - Details Deferred but put major emphasis on directional preference today. He regularly does these at home   PTRx Ther Proc 6 Neutral Spine Slouch Overcorrect   PTRx Ther Proc 6 - Details Deferred but put major emphasis on directional preference today. He regularly does these at home   PTRx Ther Proc 7 Neutral Spine Standing   PTRx Ther Proc 7 - Details Reviewed   Skilled Intervention Continued HEP   Patient Response/Progress Putting pillows under legs took tension off the nerve resulting in reduced  foot numbness.  Has tolerated increased degrees of lumbar extension in static prone lying positions   Plan   Home program See PTRx   Updates to plan of care 2x a week OR MORE because of acuity, red flags and severity   Plan for next session Continue MDT for radicular and foot numbness, but next session put further emphasis on strengthening of the entire posterior chain   Total Session Time   Timed Code Treatment Minutes 65   Total Treatment Time (sum of timed and untimed services) 65           PLAN  Continue therapy per current plan of care.  Mario has had some progress with therapy thus far.  When Mario first started therapy he had consistent 7-9/10 pain radiating down both legs to his feet with no tolerance for positions like prone or supine.  We've used MDT principles and have calmed his symptoms to 4/10 in the left foot during prone lying which he previously was unable to do.  We've also incorporated general leg strengthening which he is tolerating well.  In therapy we will continue these principles as they are gradually improving, but with a greater emphasis in strengthening in the future. Unfortunately after 5 days of therapy Mario still has significant and unchanging radicular leg weakness with low tolerance for standing and walking that does not seem to be improving significantly with therapy interventions.    Beginning/End Dates of Progress Note Reporting Period:  10/05/24 to 11/08/2024    Referring Provider:  Remigio Cortes

## 2024-11-13 ENCOUNTER — MYC REFILL (OUTPATIENT)
Dept: FAMILY MEDICINE | Facility: CLINIC | Age: 29
End: 2024-11-13
Payer: COMMERCIAL

## 2024-11-13 DIAGNOSIS — M54.16 LUMBAR RADICULOPATHY: ICD-10-CM

## 2024-11-17 ENCOUNTER — HEALTH MAINTENANCE LETTER (OUTPATIENT)
Age: 29
End: 2024-11-17

## 2024-11-18 RX ORDER — OXYCODONE HYDROCHLORIDE 10 MG/1
10 TABLET ORAL EVERY 4 HOURS PRN
Qty: 10 TABLET | Refills: 0 | Status: SHIPPED | OUTPATIENT
Start: 2024-11-18

## 2024-11-20 DIAGNOSIS — M54.16 LUMBAR RADICULOPATHY: ICD-10-CM

## 2024-11-20 DIAGNOSIS — M54.41 CHRONIC MIDLINE LOW BACK PAIN WITH BILATERAL SCIATICA: Primary | ICD-10-CM

## 2024-11-20 DIAGNOSIS — M54.42 CHRONIC MIDLINE LOW BACK PAIN WITH BILATERAL SCIATICA: Primary | ICD-10-CM

## 2024-11-20 DIAGNOSIS — R29.898 WEAKNESS OF BOTH LOWER EXTREMITIES: ICD-10-CM

## 2024-11-20 DIAGNOSIS — G89.29 CHRONIC MIDLINE LOW BACK PAIN WITH BILATERAL SCIATICA: Primary | ICD-10-CM

## 2024-11-20 DIAGNOSIS — R32 URINARY INCONTINENCE, UNSPECIFIED TYPE: ICD-10-CM

## 2024-11-20 NOTE — TELEPHONE ENCOUNTER
REASON FOR VISIT: lumbar radiculopathy    DATE OF APPT: 11/21/2024   NOTES (FOR ALL VISITS) STATUS DETAILS   OFFICE NOTE from referring provider Internal [facility] Brook Jeter APRN CNP 10/21/2024   MEDICATION LIST N/A    IMAGING  (FOR ALL VISITS)     XR N/A    MRI (HEAD, NECK, SPINE) Internal [facility]  MR Thoracic spine 9/28/2024  MR Lumbar spine 9/27/2024   CT (HEAD, NECK, SPINE) Internal [facility]  CT Lumbar spine 9/27/2024  CT Thoracic spine 9/27/2024

## 2024-11-21 ENCOUNTER — PRE VISIT (OUTPATIENT)
Dept: NEUROSURGERY | Facility: CLINIC | Age: 29
End: 2024-11-21

## 2024-11-21 ENCOUNTER — TELEPHONE (OUTPATIENT)
Dept: NEUROLOGY | Facility: CLINIC | Age: 29
End: 2024-11-21

## 2024-11-21 NOTE — TELEPHONE ENCOUNTER
Called and left message for patient to call back. Need to reschedule patient due to provider leaving. Asked him to call back.

## 2024-12-02 ENCOUNTER — OFFICE VISIT (OUTPATIENT)
Dept: FAMILY MEDICINE | Facility: CLINIC | Age: 29
End: 2024-12-02
Payer: COMMERCIAL

## 2024-12-02 VITALS
HEART RATE: 102 BPM | DIASTOLIC BLOOD PRESSURE: 82 MMHG | WEIGHT: 312.2 LBS | HEIGHT: 72 IN | TEMPERATURE: 98.1 F | SYSTOLIC BLOOD PRESSURE: 118 MMHG | OXYGEN SATURATION: 96 % | RESPIRATION RATE: 24 BRPM | BODY MASS INDEX: 42.29 KG/M2

## 2024-12-02 DIAGNOSIS — F51.04 PSYCHOPHYSIOLOGICAL INSOMNIA: ICD-10-CM

## 2024-12-02 DIAGNOSIS — M54.16 LUMBAR RADICULOPATHY: Primary | ICD-10-CM

## 2024-12-02 DIAGNOSIS — E66.813 CLASS 3 SEVERE OBESITY WITH SERIOUS COMORBIDITY AND BODY MASS INDEX (BMI) OF 40.0 TO 44.9 IN ADULT, UNSPECIFIED OBESITY TYPE (H): ICD-10-CM

## 2024-12-02 DIAGNOSIS — R73.9 HYPERGLYCEMIA: ICD-10-CM

## 2024-12-02 DIAGNOSIS — E66.01 CLASS 3 SEVERE OBESITY WITH SERIOUS COMORBIDITY AND BODY MASS INDEX (BMI) OF 40.0 TO 44.9 IN ADULT, UNSPECIFIED OBESITY TYPE (H): ICD-10-CM

## 2024-12-02 LAB
EST. AVERAGE GLUCOSE BLD GHB EST-MCNC: 137 MG/DL
HBA1C MFR BLD: 6.4 % (ref 0–5.6)

## 2024-12-02 PROCEDURE — 99214 OFFICE O/P EST MOD 30 MIN: CPT | Performed by: PHYSICIAN ASSISTANT

## 2024-12-02 PROCEDURE — 83036 HEMOGLOBIN GLYCOSYLATED A1C: CPT | Performed by: PHYSICIAN ASSISTANT

## 2024-12-02 PROCEDURE — 36415 COLL VENOUS BLD VENIPUNCTURE: CPT | Performed by: PHYSICIAN ASSISTANT

## 2024-12-02 RX ORDER — DICLOFENAC SODIUM 75 MG/1
75 TABLET, DELAYED RELEASE ORAL 2 TIMES DAILY
Qty: 60 TABLET | Refills: 0 | Status: SHIPPED | OUTPATIENT
Start: 2024-12-02

## 2024-12-02 ASSESSMENT — PAIN SCALES - GENERAL: PAINLEVEL_OUTOF10: SEVERE PAIN (7)

## 2024-12-02 ASSESSMENT — ENCOUNTER SYMPTOMS: BACK PAIN: 1

## 2024-12-02 NOTE — LETTER
My Asthma Action Plan    Name: Mario Calderon   YOB: 1995  Date: 12/2/2024   My doctor: Cedrick Osorio PA-C   My clinic: [facility]        My Rescue Medicine:   Albuterol inhaler (Proair/Ventolin/Proventil HFA)  2-4 puffs EVERY 4 HOURS as needed. Use a spacer if recommended by your provider.   My Asthma Severity:   Intermittent / Exercise Induced  Know your asthma triggers:              GREEN ZONE   Good Control  I feel good  No cough or wheeze  Can work, sleep and play without asthma symptoms       Take your asthma control medicine every day.     If exercise triggers your asthma, take your rescue medication  15 minutes before exercise or sports, and  During exercise if you have asthma symptoms  Spacer to use with inhaler: If you have a spacer, make sure to use it with your inhaler             YELLOW ZONE Getting Worse  I have ANY of these:  I do not feel good  Cough or wheeze  Chest feels tight  Wake up at night   Keep taking your Green Zone medications  Start taking your rescue medicine:  every 20 minutes for up to 1 hour. Then every 4 hours for 24-48 hours.  If you stay in the Yellow Zone for more than 12-24 hours, contact your doctor.  If you do not return to the Green Zone in 12-24 hours or you get worse, start taking your oral steroid medicine if prescribed by your provider.           RED ZONE Medical Alert - Get Help  I have ANY of these:  I feel awful  Medicine is not helping  Breathing getting harder  Trouble walking or talking  Nose opens wide to breathe       Take your rescue medicine NOW  If your provider has prescribed an oral steroid medicine, start taking it NOW  Call your doctor NOW  If you are still in the Red Zone after 20 minutes and you have not reached your doctor:  Take your rescue medicine again and  Call 911 or go to the emergency room right away    See your regular doctor within 2 weeks of an Emergency Room or Urgent Care visit for follow-up  treatment.          Annual Reminders:  Meet with Asthma Educator,  Flu Shot in the Fall, consider Pneumonia Vaccination for patients with asthma (aged 19 and older).    Pharmacy:    [facility] [place] - [address]  [id number] - [place] - [address] AT [facility] [address]    Electronically signed by Cedrick Osorio PA-C   Date: 12/02/24                    Asthma Triggers  How To Control Things That Make Your Asthma Worse    Triggers are things that make your asthma worse.  Look at the list below to help you find your triggers and   what you can do about them. You can help prevent asthma flare-ups by staying away from your triggers.      Trigger                                                          What you can do   Cigarette Smoke  Tobacco smoke can make asthma worse. Do not allow smoking in your home, car or around you.  Be sure no one smokes at a child s day care or school.  If you smoke, ask your health care provider for ways to help you quit.  Ask family members to quit too.  Ask your health care provider for a referral to Quit Plan to help you quit smoking, or call 3-265-008-PLAN.     Colds, Flu, Bronchitis  These are common triggers of asthma. Wash your hands often.  Don t touch your eyes, nose or mouth.  Get a flu shot every year.     Dust Mites  These are tiny bugs that live in cloth or carpet. They are too small to see. Wash sheets and blankets in hot water every week.   Encase pillows and mattress in dust mite proof covers.  Avoid having carpet if you can. If you have carpet, vacuum weekly.   Use a dust mask and HEPA vacuum.   Pollen and Outdoor Mold  Some people are allergic to trees, grass, or weed pollen, or molds. Try to keep your windows closed.  Limit time out doors when pollen count is high.   Ask you health care provider about taking medicine during allergy season.     Animal Dander  Some people are allergic to skin  flakes, urine or saliva from pets with fur or feathers. Keep pets with fur or feathers out of your home.    If you can t keep the pet outdoors, then keep the pet out of your bedroom.  Keep the bedroom door closed.  Keep pets off cloth furniture and away from stuffed toys.     Mice, Rats, and Cockroaches  Some people are allergic to the waste from these pests.   Cover food and garbage.  Clean up spills and food crumbs.  Store grease in the refrigerator.   Keep food out of the bedroom.   Indoor Mold  This can be a trigger if your home has high moisture. Fix leaking faucets, pipes, or other sources of water.   Clean moldy surfaces.  Dehumidify basement if it is damp and smelly.   Smoke, Strong Odors, and Sprays  These can reduce air quality. Stay away from strong odors and sprays, such as perfume, powder, hair spray, paints, smoke incense, paint, cleaning products, candles and new carpet.   Exercise or Sports  Some people with asthma have this trigger. Be active!  Ask your doctor about taking medicine before sports or exercise to prevent symptoms.    Warm up for 5-10 minutes before and after sports or exercise.     Other Triggers of Asthma  Cold air:  Cover your nose and mouth with a scarf.  Sometimes laughing or crying can be a trigger.  Some medicines and food can trigger asthma.

## 2024-12-02 NOTE — PROGRESS NOTES
Whit Martin is a 29 year old, presenting for the following health issues:  Back Pain (Spinal injury 9/27/24/Currently in physical therapy/Needing refills) and Health Maintenance (Patient declines vaccines today (pneumonia. Covid))        12/2/2024     3:21 PM   Additional Questions   Roomed by Domonique Pizarro CMA   Accompanied by None         12/2/2024     3:21 PM   Patient Reported Additional Medications   Patient reports taking the following new medications none     History of Present Illness       Back Pain:  He presents for follow up of back pain. Patient's back pain is a chronic problem.  Location of back pain:  Right lower back, left lower back, right buttock, left buttock, right hip and left hip  Description of back pain: burning, dull ache, sharp, shooting, stabbing and other  Back pain spreads: right buttocks, left buttocks, left thigh and left foot    Since patient first noticed back pain, pain is: gradually worsening  Does back pain interfere with his job:  Yes      He is taking medications regularly.     Ongoing low back pain. Followed by spine.   History pain induced insomnia.  Paresthesias in legs. Some radicular pain into right hip.   Obesity.   Insomnia. Due to anxiety and pain.   History of some hyperglycemia     Review of Systems  Constitutional, HEENT, cardiovascular, pulmonary, GI, , musculoskeletal, neuro, skin, endocrine and psych systems are negative, except as otherwise noted.      Objective    /82   Pulse 102   Temp 98.1  F (36.7  C) (Oral)   Resp 24   Ht 1.829 m (6')   Wt 141.6 kg (312 lb 3.2 oz)   SpO2 96%   BMI 42.34 kg/m    Body mass index is 42.34 kg/m .  Physical Exam   Eye exam - right eye normal lid, conjunctiva, cornea, pupil and fundus, left eye normal lid, conjunctiva, cornea, pupil and fundus.  CHEST:chest clear to IPPA, no tachypnea, retractions or cyanosis, and S1, S2 normal, no murmur, no gallop, rate regular.  BACK: Lumbosacral spine area reveals  local tenderness.  Painful and reduced LS ROM noted. Straight leg raise is pos especially on the right. .  DTR's, motor strength and sensation normal, including heel and toe gait.  Perifpheral pulses are palpable.  Hipes and knees have full range of motion without pain.  No abdominal tenderness, mass or organomegaly.    Mario was seen today for back pain and health maintenance.    Diagnoses and all orders for this visit:    Lumbar radiculopathy  -     diclofenac (VOLTAREN) 75 MG EC tablet; Take 1 tablet (75 mg) by mouth 2 times daily.  -     Spine  Referral; Future    Psychophysiological insomnia  -     amitriptyline (ELAVIL) 25 MG tablet; Take 1 tablet (25 mg) by mouth at bedtime.    Class 3 severe obesity with serious comorbidity and body mass index (BMI) of 40.0 to 44.9 in adult, unspecified obesity type (H)  -     tirzepatide-Weight Management (ZEPBOUND) 2.5 MG/0.5ML prefilled pen; Inject 0.5 mLs (2.5 mg) subcutaneously every 7 days.    Hyperglycemia  -     Hemoglobin A1c; Future  -     Hemoglobin A1c              Advised supportive and symptomatic treatment.  Follow up with Provider - if condition persists or worsens.   Continue to work on a lower sugar/starch diet and more exercise.    Signed Electronically by: Cedrick Osorio PA-C

## 2024-12-05 ENCOUNTER — OFFICE VISIT (OUTPATIENT)
Dept: NEUROSURGERY | Facility: CLINIC | Age: 29
End: 2024-12-05
Payer: COMMERCIAL

## 2024-12-05 VITALS
BODY MASS INDEX: 42.26 KG/M2 | HEART RATE: 87 BPM | DIASTOLIC BLOOD PRESSURE: 102 MMHG | HEIGHT: 72 IN | WEIGHT: 312 LBS | SYSTOLIC BLOOD PRESSURE: 145 MMHG

## 2024-12-05 DIAGNOSIS — M54.16 LUMBAR RADICULOPATHY: ICD-10-CM

## 2024-12-05 PROCEDURE — 99204 OFFICE O/P NEW MOD 45 MIN: CPT | Performed by: STUDENT IN AN ORGANIZED HEALTH CARE EDUCATION/TRAINING PROGRAM

## 2024-12-05 RX ORDER — METHOCARBAMOL 500 MG/1
500 TABLET, FILM COATED ORAL 4 TIMES DAILY PRN
Qty: 60 TABLET | Refills: 2 | Status: SHIPPED | OUTPATIENT
Start: 2024-12-05

## 2024-12-05 NOTE — PATIENT INSTRUCTIONS
Patient Instructions    Surgery scheduled at [facility] for Lumbar 3-4, 4-5 Laminectomy,  Possible Discectomy with Dr. Pryor    Pre-Operative  Surgical risks: blood clots in the leg or lung, problems urinating, nerve damage, drainage from the incision, infection,stiffness  Pre-operative physical with primary care physician within 30 days of surgical date.   Likely same day procedure with discharge home day of surgery, may stay for 23 hour observation hospitalization for monitoring.     Shower procedure  Please shower with antimicrobial soap the night before surgery and morning of surgery. Please refer to showering instruction sheet in folder.  Eating/Drinking  Stop solid foods 8 hours prior to arrival time  May have clear liquids up to 4 hours prior to arrival time   Clear liquids include water, clear juice, black coffee, or clear tea without milk, Gatorade, clear soda.   Medications  Hold Aspirin, NSAIDs (Advil/Ibuprofen, Indocin, Naproxen,Nuprin,Relafen/Nabumetone, Diclofenac,Meloxicam, Aleve, Celebrex) x 7 days prior to surgical date  Injectable: Semaglutide/Ozempic/Wegovy, Trulicity, Mounjaro/ZEPBOUND   Need to be held 2 weeks prior to surgery due to risk for aspiration because of slowed gastric emptying.   Oral medications: Hold 3 days prior to surgery.   You can take Tylenol (Acetaminophen) for pain, 1000 mg  Do not exceed 3,000 mg per day   If you are on chronic pain medication (oxycodone, Percocet, hydrocodone, Vicodin, Norco, Dilaudid, morphine, MS Contin, naltrexone, Suboxone, etc) or have a pain contract we will reach out to your pain clinic to gather your most recent records and recommendations for pain management post-op.  Please ask your provider who manages your chronic pain if they require you to schedule an office visit prior to surgery. Continue obtaining your pain medications from your current provider until surgery. Our team will manage your acute post-op pain in the  "hospital and during the recovery period. Your pain team will continue to manage your chronic pain.  Any other medications prescribed, please discuss with your primary care provider at your pre-operative physical   As part of preparation for your upcoming procedure you are required to have a test for the novel Coronavirus, COVID-19  SDS Covid Testing: You will need to have a test for the novel Coronavirus, COVID-19.The rapid antigen test needs to be completed within 1-2 days prior to surgery. Please take a photo of the negative test and bring to surgery to show the nurse. If positive, please refer to the \"Before Your Procedure or Hospital Admission\" printout.  You may NOT receive the COVID-19 vaccine 72 hours before or after surgery.    Pain Management  You will have post-operative incisional pain which may require pain medications and muscle relaxants. You will receive medication upon discharge.  You may resume taking NSAIDs (ex. Ibuprofen, aleve, naproxen) immediately post-op  Do NOT drive while taking narcotic pain medication  Do NOT drink alcohol while using any pain medication  You can utilize ice as needed (no longer than 20 minutes at one time)  Avoid putting heat on the incision    Incision Care  No submerging incision in water such as pools, hot tubs, baths for at least 8 weeks or until incision is healed  It is okay to shower, just pat the incision dry   Remove dressing as instructed upon discharge  Watch for signs of infection  Redness, swelling, warmth, drainage, and fever of 101 degrees or higher  Notify clinic [phone].    Activity Restrictions  For the first 6-8 weeks, no lifting > 10 pounds, limited bending, twisting, or overhead reaching.  Take stairs in moderation   Ok to walk as tolerated, take short frequent walks. You may gradually increase the distance as tolerated.   Avoid bed rest and prolonged sitting for longer than 30 minutes (change positions frequently while awake)  No contact sports " until after follow up visit  No high impact activities such as; running/jogging, snowmobile or 4 wyman riding or any other recreational vehicles  Please call the clinic if you develop any of the following symptoms:  Swelling and/or warmth in one or both legs  Pain or tenderness in your leg, ankle, foot, or arm   Red or discolored skin     Post-Op Follow Up Appointments  2 week incision check with RN  6 week post op follow up visit with Physician Assistant or Nurse Practitioner   3 months post op with Dr. Pryor   Please call to schedule follow up appointment at [phone]    Resources  If you are currently employed, you will need to be off work for 2-4 weeks for post op recovery and healing.  Please fax any FMLA/short term disability paperwork to [phone]  You may call our clinic when you'd like to return to work and we can provide a work letter  To allow staff adequate time to complete paperwork, please send as soon as possible       [facility]  Phone: [phone]  Fax: [phone]

## 2024-12-05 NOTE — NURSING NOTE
Mario Calderon's goals for this visit include:   Chief Complaint   Patient presents with    New Patient     Lumbar radiculopathy       He requests these members of his care team be copied on today's visit information: yes    PCP: Cedrick woodward    Referring Provider:  Remigio Cortes MD  [address] [place] [id number]    BP (!) 145/102 (BP Location: Right arm, Patient Position: Sitting, Cuff Size: Adult Large)   Pulse 87   Ht 1.829 m (6')   Wt 141.5 kg (312 lb)   BMI 42.31 kg/m      Do you need any medication refills at today's visit? No  CHUCK Allan, JEANNE ([facility])

## 2024-12-05 NOTE — LETTER
12/5/2024      Mario Calderon  [address] [place] [address]      Dear Colleague,    Thank you for referring your patient, Mario Calderon, to the [facility] [place]. Please see a copy of my visit note below.    HPI:  29-year-old male with new onset low back pain radiating to the bilateral lower extremity starting in September 2024.  He noted on the morning of September 27 he woke up with pain in his bilateral legs.  At that time he noticed urinary incontinence.  Since that time he is continue to have incontinence.  The pain has improved more in the right leg than the left leg.  He does have continued numbness more in the left leg than the right leg.  The pain radiates down the backside of the leg.  He does have a history of a right leg amputation after a workplace injury.  He has been working with physical therapy recently which has noticed some improvement but not significant.  Current Outpatient Medications   Medication Sig Dispense Refill     amitriptyline (ELAVIL) 25 MG tablet Take 1 tablet (25 mg) by mouth at bedtime. 90 tablet 1     diclofenac (VOLTAREN) 75 MG EC tablet Take 1 tablet (75 mg) by mouth 2 times daily. 60 tablet 0     hydrOXYzine HCl (ATARAX) 25 MG tablet Take 1-2 tablets (25-50 mg) by mouth nightly as needed for anxiety or other (insomnia). 90 tablet 0     Incontinence Supply Disposable (SLIMLINE DISPOSABLE BRIEFS XL) MISC Change every 4 hours or as needed 36 each 4     levalbuterol (XOPENEX HFA) 45 MCG/ACT inhaler Inhale 2 puffs into the lungs every 4 hours as needed for shortness of breath or wheezing. 15 g 2     losartan (COZAAR) 25 MG tablet Take 1 tablet (25 mg) by mouth daily. 90 tablet 3     oxyCODONE IR (ROXICODONE) 10 MG tablet Take 1 tablet (10 mg) by mouth every 4 hours as needed for severe pain. 10 tablet 0     polyethylene glycol (MIRALAX) 17 GM/Dose powder Take 17 g by mouth daily. 850 g 1     pregabalin (LYRICA) 50 MG capsule  Take 1 capsule (50 mg) by mouth 3 times daily. 90 capsule 0     SENNA-docusate sodium (SENNA S) 8.6-50 MG tablet Take 1 tablet by mouth 2 times daily as needed (Constipation). 30 tablet 3     tirzepatide-Weight Management (ZEPBOUND) 2.5 MG/0.5ML prefilled pen Inject 0.5 mLs (2.5 mg) subcutaneously every 7 days. 2 mL 0     No current facility-administered medications for this visit.      Physical Exam:  Vital signs:      BP: (!) 145/102 Pulse: 87           Height: 182.9 cm (6') Weight: 141.5 kg (312 lb)  Estimated body mass index is 42.31 kg/m  as calculated from the following:    Height as of this encounter: 1.829 m (6').    Weight as of this encounter: 141.5 kg (312 lb).  He has full strength in his left lower extremity.  He has full strength in right hip flexion with a amputation be on his right.  Sensation is diminished over the posterior aspect of the lower leg on the left.  Patellar reflexe is 1+ on the left.  Results Reviewed:  I personally viewed the images of an MRI of the lumbar spine.  This shows severe central canal stenosis at L3-4 and L4-5 with large disc herniations associated with this.  CT scan of this shows significant calcification surrounding the disc herniations.  There is some stenosis at L5-S1 although not severe.  There is significant facet hypertrophy and autofusion on the left at L5-S1.  Assessment:  29-year-old male with lumbar stenosis with symptoms consistent with neurogenic claudication and radiculopathy.  Likely also related to long-term cauda equina syndrome.  Plan:  We discussed conservative and surgical management options going forward.  While his weight does play a role and determining surgery with his significant symptoms I do believe surgery is necessary.  We discussed that would like to do this sooner rather than later.  Given his symptoms have been present for so long he would like to wait till his brother is in town in January and so we will schedule him for January 6 at [facility] for a laminectomy at L3-4 and L4-5 with possible discectomy.  Given the calcifications present this may be difficult to completely decompress the disc herniations however we will try and open up the canal is much as possible.  We discussed risks and benefits of the procedure overall and I do believe the risks of not doing the procedure are still higher than the risks with the procedure at this time.  We discussed expectation of recovery that I do believe he will improve some but I am not certain that he will recover any control with bowel or bladder function given symptoms been present for so long.  It is his best chance to improve these however with surgery.    Clark Pryor MD      Again, thank you for allowing me to participate in the care of your patient.        Sincerely,        Clark Pryor MD

## 2024-12-05 NOTE — PROGRESS NOTES
HPI:  29-year-old male with new onset low back pain radiating to the bilateral lower extremity starting in September 2024.  He noted on the morning of September 27 he woke up with pain in his bilateral legs.  At that time he noticed urinary incontinence.  Since that time he is continue to have incontinence.  The pain has improved more in the right leg than the left leg.  He does have continued numbness more in the left leg than the right leg.  The pain radiates down the backside of the leg.  He does have a history of a right leg amputation after a workplace injury.  He has been working with physical therapy recently which has noticed some improvement but not significant.  Current Outpatient Medications   Medication Sig Dispense Refill    amitriptyline (ELAVIL) 25 MG tablet Take 1 tablet (25 mg) by mouth at bedtime. 90 tablet 1    diclofenac (VOLTAREN) 75 MG EC tablet Take 1 tablet (75 mg) by mouth 2 times daily. 60 tablet 0    hydrOXYzine HCl (ATARAX) 25 MG tablet Take 1-2 tablets (25-50 mg) by mouth nightly as needed for anxiety or other (insomnia). 90 tablet 0    Incontinence Supply Disposable (SLIMLINE DISPOSABLE BRIEFS XL) MISC Change every 4 hours or as needed 36 each 4    levalbuterol (XOPENEX HFA) 45 MCG/ACT inhaler Inhale 2 puffs into the lungs every 4 hours as needed for shortness of breath or wheezing. 15 g 2    losartan (COZAAR) 25 MG tablet Take 1 tablet (25 mg) by mouth daily. 90 tablet 3    oxyCODONE IR (ROXICODONE) 10 MG tablet Take 1 tablet (10 mg) by mouth every 4 hours as needed for severe pain. 10 tablet 0    polyethylene glycol (MIRALAX) 17 GM/Dose powder Take 17 g by mouth daily. 850 g 1    pregabalin (LYRICA) 50 MG capsule Take 1 capsule (50 mg) by mouth 3 times daily. 90 capsule 0    SENNA-docusate sodium (SENNA S) 8.6-50 MG tablet Take 1 tablet by mouth 2 times daily as needed (Constipation). 30 tablet 3    tirzepatide-Weight Management (ZEPBOUND) 2.5 MG/0.5ML prefilled pen Inject 0.5 mLs  (2.5 mg) subcutaneously every 7 days. 2 mL 0     No current facility-administered medications for this visit.      Physical Exam:  Vital signs:      BP: (!) 145/102 Pulse: 87           Height: 182.9 cm (6') Weight: 141.5 kg (312 lb)  Estimated body mass index is 42.31 kg/m  as calculated from the following:    Height as of this encounter: 1.829 m (6').    Weight as of this encounter: 141.5 kg (312 lb).  He has full strength in his left lower extremity.  He has full strength in right hip flexion with a amputation be on his right.  Sensation is diminished over the posterior aspect of the lower leg on the left.  Patellar reflexe is 1+ on the left.  Results Reviewed:  I personally viewed the images of an MRI of the lumbar spine.  This shows severe central canal stenosis at L3-4 and L4-5 with large disc herniations associated with this.  CT scan of this shows significant calcification surrounding the disc herniations.  There is some stenosis at L5-S1 although not severe.  There is significant facet hypertrophy and autofusion on the left at L5-S1.  Assessment:  29-year-old male with lumbar stenosis with symptoms consistent with neurogenic claudication and radiculopathy.  Likely also related to long-term cauda equina syndrome.  Plan:  We discussed conservative and surgical management options going forward.  While his weight does play a role and determining surgery with his significant symptoms I do believe surgery is necessary.  We discussed that would like to do this sooner rather than later.  Given his symptoms have been present for so long he would like to wait till his brother is in town in January and so we will schedule him for January 6 at [facility] for a laminectomy at L3-4 and L4-5 with possible discectomy.  Given the calcifications present this may be difficult to completely decompress the disc herniations however we will try and open up the canal is much as possible.  We discussed risks and benefits of the  procedure overall and I do believe the risks of not doing the procedure are still higher than the risks with the procedure at this time.  We discussed expectation of recovery that I do believe he will improve some but I am not certain that he will recover any control with bowel or bladder function given symptoms been present for so long.  It is his best chance to improve these however with surgery.    Clark Pryor MD

## 2024-12-09 ENCOUNTER — TELEPHONE (OUTPATIENT)
Dept: FAMILY MEDICINE | Facility: CLINIC | Age: 29
End: 2024-12-09

## 2024-12-09 NOTE — TELEPHONE ENCOUNTER
Prior Authorization Retail Medication Request    Medication/Dose: tirzepatide-Weight Management (ZEPBOUND) 2.5 MG/0.5ML prefilled pe  Diagnosis and ICD code (if different than what is on RX):  E66.813, E66.01, Z68.41   New/renewal/insurance change PA/secondary ins. PA:  Previously Tried and Failed:  na  Rationale:  na    Insurance   Primary: [employer]  Insurance ID:  [id number]     Secondary (if applicable):na  Insurance ID:  na    Pharmacy Information (if different than what is on RX)  Name:  [facility]   Phone:  [phone]  Fax:[phone]    Clinic Information  Preferred routing pool for dept communication: [facility]

## 2024-12-11 NOTE — TELEPHONE ENCOUNTER
PA Initiation    Medication: ZEPBOUND 2.5 MG/0.5ML SC SOAJ  Insurance Company: [employer] - Phone [phone] Fax [phone]  Pharmacy Filling the Rx: [id number] - [place], [place] - [address] AT [facility] [address]  Filling Pharmacy Phone: [phone]    Start Date: 12/11/2024

## 2024-12-12 NOTE — TELEPHONE ENCOUNTER
PRIOR AUTHORIZATION DENIED    Medication: ZEPBOUND 2.5 MG/0.5ML SC SOAJ  Insurance Company: [employer] - Phone [phone] Fax [phone]  Denial Date: 12/11/2024  Denial Reason(s): Did not meet criteria and need to try/fail preferred agents      Appeal Information:       Patient Notified: No

## 2024-12-17 ENCOUNTER — OFFICE VISIT (OUTPATIENT)
Dept: FAMILY MEDICINE | Facility: CLINIC | Age: 29
End: 2024-12-17
Payer: COMMERCIAL

## 2024-12-17 VITALS
WEIGHT: 311 LBS | BODY MASS INDEX: 42.12 KG/M2 | HEIGHT: 72 IN | OXYGEN SATURATION: 97 % | TEMPERATURE: 97.7 F | HEART RATE: 83 BPM | DIASTOLIC BLOOD PRESSURE: 70 MMHG | SYSTOLIC BLOOD PRESSURE: 116 MMHG | RESPIRATION RATE: 24 BRPM

## 2024-12-17 DIAGNOSIS — Z01.818 PREOP GENERAL PHYSICAL EXAM: Primary | ICD-10-CM

## 2024-12-17 DIAGNOSIS — M54.16 LUMBAR RADICULOPATHY: ICD-10-CM

## 2024-12-17 DIAGNOSIS — I10 BENIGN ESSENTIAL HYPERTENSION: ICD-10-CM

## 2024-12-17 LAB
ANION GAP SERPL CALCULATED.3IONS-SCNC: 15 MMOL/L (ref 7–15)
BUN SERPL-MCNC: 12.6 MG/DL (ref 6–20)
CALCIUM SERPL-MCNC: 9.4 MG/DL (ref 8.8–10.4)
CHLORIDE SERPL-SCNC: 99 MMOL/L (ref 98–107)
CREAT SERPL-MCNC: 0.9 MG/DL (ref 0.67–1.17)
CREAT UR-MCNC: 250 MG/DL
EGFRCR SERPLBLD CKD-EPI 2021: >90 ML/MIN/1.73M2
GLUCOSE SERPL-MCNC: 124 MG/DL (ref 70–99)
HCO3 SERPL-SCNC: 21 MMOL/L (ref 22–29)
HGB BLD-MCNC: 14 G/DL (ref 13.3–17.7)
MICROALBUMIN UR-MCNC: 28 MG/L
MICROALBUMIN/CREAT UR: 11.2 MG/G CR (ref 0–17)
PLATELET # BLD AUTO: 322 10E3/UL (ref 150–450)
POTASSIUM SERPL-SCNC: 4 MMOL/L (ref 3.4–5.3)
SODIUM SERPL-SCNC: 135 MMOL/L (ref 135–145)

## 2024-12-17 PROCEDURE — 99214 OFFICE O/P EST MOD 30 MIN: CPT | Mod: 25 | Performed by: PHYSICIAN ASSISTANT

## 2024-12-17 PROCEDURE — 85018 HEMOGLOBIN: CPT | Performed by: PHYSICIAN ASSISTANT

## 2024-12-17 PROCEDURE — 82043 UR ALBUMIN QUANTITATIVE: CPT | Performed by: PHYSICIAN ASSISTANT

## 2024-12-17 PROCEDURE — 80048 BASIC METABOLIC PNL TOTAL CA: CPT | Performed by: PHYSICIAN ASSISTANT

## 2024-12-17 PROCEDURE — 85049 AUTOMATED PLATELET COUNT: CPT | Performed by: PHYSICIAN ASSISTANT

## 2024-12-17 PROCEDURE — 36415 COLL VENOUS BLD VENIPUNCTURE: CPT | Performed by: PHYSICIAN ASSISTANT

## 2024-12-17 PROCEDURE — 82570 ASSAY OF URINE CREATININE: CPT | Performed by: PHYSICIAN ASSISTANT

## 2024-12-17 PROCEDURE — 93000 ELECTROCARDIOGRAM COMPLETE: CPT | Performed by: PHYSICIAN ASSISTANT

## 2024-12-17 NOTE — PROGRESS NOTES
Preoperative Evaluation  [facility] [place]  [address]  [place] [address]  Phone: [phone]  Primary Provider: Cedrick Osorio PA-C  Pre-op Performing Provider: Cedrick Osorio PA-C  Dec 17, 2024             12/17/2024   Surgical Information   What procedure is being done? Spine surgery    Facility or Hospital where procedure/surgery will be performed: [address] [facility] [place] [address]    Who is doing the procedure / surgery? Clark pryor    Date of surgery / procedure: Jan 6    Time of surgery / procedure: 9 50    Where do you plan to recover after surgery? at home with family        Patient-reported     Fax number for surgical facility: Note does not need to be faxed, will be available electronically in Epic.      Whit Martin is a 29 year old, presenting for the following:  Pre-Op Exam (1/6/25/Lumbar 3-4, 4-5 Laminectomy, Possible Discectomy/[facility]/Dr. Pryor /) and Health Maintenance (Patient is fasting/Patient declines vaccines today (covid and pneumonia))          12/17/2024     8:16 AM   Additional Questions   Roomed by Domonique Pizarro CMA   Accompanied by None         12/17/2024     8:16 AM   Patient Reported Additional Medications   Patient reports taking the following new medications none     HPI related to upcoming procedure: Lumbar 3-4, 4-5 Laminectomy, Possible Discectomy        12/17/2024   Pre-Op Questionnaire   Have you ever had a heart attack or stroke? No    Have you ever had surgery on your heart or blood vessels, such as a stent placement, a coronary artery bypass, or surgery on an artery in your head, neck, heart, or legs? No    Do you have chest pain with activity? No    Do you have a history of heart failure? No    Do you currently have a cold, bronchitis or symptoms of other infection? No    Do you have a cough, shortness of breath, or wheezing? No    Do you or anyone in your family have previous history of blood clots? NO    Do you or does anyone in your family have a serious bleeding problem such as prolonged bleeding following surgeries or cuts? NO    Have you ever had problems with anemia or been told to take iron pills? No    Have you had any abnormal blood loss such as black, tarry or bloody stools? No    Have you ever had a blood transfusion? (!) UNKNOWN    Are you willing to have a blood transfusion if it is medically needed before, during, or after your surgery? Yes    Have you or any of your relatives ever had problems with anesthesia? No    Do you have sleep apnea, excessive snoring or daytime drowsiness? No    Do you have any artifical heart valves or other implanted medical devices like a pacemaker, defibrillator, or continuous glucose monitor? No    Do you have artificial joints? No    Are you allergic to latex? No        Patient-reported     Health Care Directive  Patient does not have a Health Care Directive: Patient states has Advance Directive and will bring in a copy to clinic.    Preoperative Review of    reviewed - controlled substances prescribed by other outside provider(s).      Status of Chronic Conditions:  See problem list for active medical problems.  Problems all longstanding and stable, except as noted/documented.  See ROS for pertinent symptoms related to these conditions.    Patient Active Problem List    Diagnosis Date Noted    Lower extremity weakness 10/08/2024     Priority: Medium    Lumbar radiculopathy 10/01/2024     Priority: Medium    Urinary retention 09/27/2024     Priority: Medium    Bilateral leg numbness 09/27/2024     Priority: Medium    Chronic midline low back pain with bilateral sciatica 09/27/2024     Priority: Medium    Urinary incontinence, unspecified type 09/27/2024     Priority: Medium    Essential hypertension with goal blood pressure less than 140/90 09/01/2017     Priority: Medium    S/P Syme amputation of foot (H) 06/08/2016     Priority: Medium     Last Assessment &  Plan:     21yoM 17 mo s/p R Syme amputation for trauma, doing well.         -Continue WBAT RLE    -Follow up with [employer] for prosthesis questions    -Pain mgmt PRN    -Continue therapy and meds as prescribed     -Provided work statement    -RTC PRN      Impaired functional mobility, balance, gait, and endurance 09/28/2015     Priority: Medium    PTSD (post-traumatic stress disorder) 08/21/2015     Priority: Medium    Acute appendicitis 10/16/2012     Priority: Medium    Diagnostic skin and sensitization tests      Priority: Medium    Allergic rhinitis due to animal dander      Priority: Medium    Seasonal allergic rhinitis      Priority: Medium    Seasonal allergic conjunctivitis      Priority: Medium    Allergy to mold spores      Priority: Medium     8/28/12 skin tests pos. for:  dog(+)/DM/M/T/G/W      CARDIOVASCULAR SCREENING; LDL GOAL LESS THAN 160 03/14/2012     Priority: Medium    Asthma, exercise induced      Priority: Medium     excersise induced      ACL tear 10/11/2010     Priority: Medium      Past Medical History:   Diagnosis Date    ADD (attention deficit disorder)     Allergic rhinitis due to animal dander     Allergy to mold spores     8/28/12 skin tests pos. for:  dog(+)/DM/M/T/G/W    Asthma, exercise induced     excersise induced    Depressive disorder 2015    Diagnostic skin and sensitization tests 8/28/12 skin tests pos. for:  dog(+)/DM/M/T/G/W    Essential hypertension with goal blood pressure less than 140/90 09/01/2017    House dust mite allergy     Seasonal allergic conjunctivitis     Seasonal allergic rhinitis      Past Surgical History:   Procedure Laterality Date    AMPUTATION  2015    APPENDECTOMY  2013    PE TUBES      [facility] HAND/FINGER SURGERY UNLISTED      [facility] TOTAL KNEE ARTHROPLASTY       Current Outpatient Medications   Medication Sig Dispense Refill    amitriptyline (ELAVIL) 25 MG tablet Take 1 tablet (25 mg) by mouth at bedtime. 90 tablet 1    diclofenac (VOLTAREN) 75 MG EC tablet  Take 1 tablet (75 mg) by mouth 2 times daily. 60 tablet 0    hydrOXYzine HCl (ATARAX) 25 MG tablet Take 1-2 tablets (25-50 mg) by mouth nightly as needed for anxiety or other (insomnia). 90 tablet 0    Incontinence Supply Disposable (SLIMLINE DISPOSABLE BRIEFS XL) MISC Change every 4 hours or as needed 36 each 4    levalbuterol (XOPENEX HFA) 45 MCG/ACT inhaler Inhale 2 puffs into the lungs every 4 hours as needed for shortness of breath or wheezing. 15 g 2    losartan (COZAAR) 25 MG tablet Take 1 tablet (25 mg) by mouth daily. 90 tablet 3    methocarbamol (ROBAXIN) 500 MG tablet Take 1 tablet (500 mg) by mouth 4 times daily as needed for muscle spasms. 60 tablet 2    polyethylene glycol (MIRALAX) 17 GM/Dose powder Take 17 g by mouth daily. 850 g 1    pregabalin (LYRICA) 50 MG capsule Take 1 capsule (50 mg) by mouth 3 times daily. 90 capsule 0    SENNA-docusate sodium (SENNA S) 8.6-50 MG tablet Take 1 tablet by mouth 2 times daily as needed (Constipation). 30 tablet 3       No Known Allergies     Social History     Tobacco Use    Smoking status: Every Day     Current packs/day: 0.25     Average packs/day: 0.3 packs/day for 20.0 years (5.0 ttl pk-yrs)     Types: Cigarettes     Start date: 1/1/2005    Smokeless tobacco: Never   Substance Use Topics    Alcohol use: No     Family History   Problem Relation Age of Onset    Psychotic Disorder Mother     Thyroid Disease Mother     Blood Disease Mother         hep b    Diabetes Mother     Hypertension Mother     Diabetes Maternal Grandmother     Cerebrovascular Disease Maternal Grandmother     Glaucoma Maternal Grandmother     Asthma Brother     Asthma Sister     Asthma Brother     Cancer Maternal Aunt     Macular Degeneration No family hx of      History   Drug Use No             Review of Systems  Constitutional, HEENT, cardiovascular, pulmonary, GI, , musculoskeletal, neuro, skin, endocrine and psych systems are negative, except as otherwise noted.    Objective    BP  116/70   Pulse 83   Temp 97.7  F (36.5  C) (Oral)   Resp 24   Ht 1.829 m (6')   Wt 141.1 kg (311 lb)   SpO2 97%   BMI 42.18 kg/m     Estimated body mass index is 42.18 kg/m  as calculated from the following:    Height as of this encounter: 1.829 m (6').    Weight as of this encounter: 141.1 kg (311 lb).    Eye exam - right eye normal lid, conjunctiva, cornea, pupil and fundus, left eye normal lid, conjunctiva, cornea, pupil and fundus.  ENT exam reveals - ENT exam normal, no neck nodes or sinus tenderness.  CHEST:chest clear to IPPA, no tachypnea, retractions or cyanosis, and S1, S2 normal, no murmur, no gallop, rate regular.  The abdomen is soft without tenderness, guarding, mass, rebound or organomegaly. Bowel sounds are normal. No CVA tenderness or inguinal adenopathy noted.  [place] was seen today for pre-op exam and health maintenance.    Diagnoses and all orders for this visit:    Preop general physical exam    Benign essential hypertension  -     Albumin Random Urine Quantitative with Creat Ratio; Future  -     EKG 12-lead complete w/read - Clinics  -     Hemoglobin; Future  -     Platelet count; Future  -     Basic metabolic panel  (Ca, Cl, CO2, Creat, Gluc, K, Na, BUN); Future    Lumbar radiculopathy      Hold all meds on the morning of surgery.  Hold diclofenac/ibuprofen/aleve/aspirin 7 days prior to surgery.    Mario is cleared for surgery and appropriate anesthesia .

## 2024-12-19 ENCOUNTER — THERAPY VISIT (OUTPATIENT)
Dept: OCCUPATIONAL THERAPY | Facility: CLINIC | Age: 29
End: 2024-12-19
Attending: HOSPITALIST
Payer: COMMERCIAL

## 2024-12-19 DIAGNOSIS — Z74.09 IMPAIRED MOBILITY AND ADLS: Primary | ICD-10-CM

## 2024-12-19 DIAGNOSIS — Z78.9 IMPAIRED MOBILITY AND ADLS: Primary | ICD-10-CM

## 2024-12-19 DIAGNOSIS — Z89.439 S/P SYME AMPUTATION OF FOOT (H): ICD-10-CM

## 2024-12-19 PROCEDURE — 97542 WHEELCHAIR MNGMENT TRAINING: CPT | Mod: GO | Performed by: OCCUPATIONAL THERAPIST

## 2024-12-19 NOTE — PROGRESS NOTES
SEATING AND WHEELED MOBILITY ASSESSMENT    [facility]  Occupational Therapy   Date of service: December 19, 2024    Referring provider: Remigio Cortes   Order Date 10/1/24  Onset Date: 10/1/24    Order Details: jennifer jackson    Funding: MA    Vendor: [employer]    Height/Weight: 6 / 312    Medical diagnosis:   Nervous and Auditory  Bilateral leg numbness  Chronic midline low back pain with bilateral sciatica  Lumbar radiculopathy     Respiratory  Asthma, exercise induced  Allergic rhinitis due to animal dander  Seasonal allergic rhinitis     Digestive  Acute appendicitis     Circulatory  Essential hypertension with goal blood pressure less than 140/90     Musculoskeletal and Integumentary  ACL tear  Lower extremity weakness     Immune  Seasonal allergic conjunctivitis     Urinary  Urinary retention  Urinary incontinence, unspecified type     Behavioral  PTSD (post-traumatic stress disorder)     Other  CARDIOVASCULAR SCREENING; LDL GOAL LESS THAN 160  Diagnostic skin and sensitization tests  Allergy to mold spores  S/P Syme amputation of foot (H)  Impaired functional mobility, balance, gait, and endurance    Upcoming surgery- Lumbar 3-4, 4-5 Laminectomy, Possible Discectomy     Patient concerns/goals: wheelchair    Living environment:  [facility]    Living environment barriers:  None   Stairs up     Current assistance/living environment:  Lives with brother    Community Mobility:  Transportation: Transportation Services, Car and motorcycle when able or medical ride  Community Mobility Requirements: Medical Appointments, Shopping, Work, currently on std due to back injury and upcoming surgery  [facility]      Current mobility equipment:  Manual wheelchair- Tracer Sx5  Knee scooter    Patient Measurements- per atp   [id number]    Fall Risk Screen:   Has the patient fallen 2  or more times in the last year? No      Has the patient fallen and had an injury in the past year? No      Is the patient a fall risk? Yes, department fall risk interventions implemented    ADL:   Feeding self:  ind  Grooming: ind  UE Dressing:  ind  LE Dressing:  ind  Showering/bathing: bench ind  Toileting/transfer:  ind  Functional Mobility: W/C      IADL:    Medications:ind  Meals: brother aids  Home management:  assist  Driving:  ind   Occupation: fork , currently on STD due to back surgery  Evaluation     Pain assessment:  Pain present  Back pain due to need for upcoming surgery  Phantom pain  Fatigue:  Reported Problems: very limited long distances walking, uses wheelchair    Balance:  Unsupported Sitting Balance: Uses UE for Balance  Sitting Balance in Chair: Uses UE for Balance  Standing Balance: WNL    Ambulation:  Level of [place]: Independent  Assistive Device(s): Wheelchair (manual)  Daily use of chair, not at work but home and community.    Neuromuscular:  History of Pressure Sores: yes, stump will     Coordination:  WFL    Tone:   WNL    Sensation:  Sensory Deficits Reported: R stump changes    Head and Neck:  Head and Neck Position: WFL    Upper Extremities  UE ROM: WFL  UE Strength: LE Strength WFL    Pelvis  Anterior/Posterior Pelvis Position: Partially Flexible, Posterior Tilt    Trunk:  Anterior/Posterior Trunk Position: Increased Thoracic Kyphosis, Partially Flexible    Lower Extremities:  LE ROM: WFL  LE Strength: LE Strength WFL  Foot Positioning:  R LE prosthetic    Edema: R Stump/leg will swell with overdoing it.     Impairments:  Fatigue  Muscle atrophy  Coordination  Balance  Pain     Treatment diagnosis:  Impaired mobility  Impaired activities of daily living    Recommendations/Plan of care:  Patient would benefit from interventions to enhance safety and independence.  Rehab potential good for stated goals.  Occupational therapy intervention for  wheelchair  management.    Goals:   Target date:   Patient, family and/or caregiver will verbalize/demonstrate understanding of compensatory methods /equipment to enhance functional independence and safety.    Educational assessment/barriers to learning:  No barriers noted    Treatment provided this date:   Wheelchair management, 40 minutes   Determined need for proper fitting k5 as patient is a daily user.  He walks during his full time work but then requires down time from his prosthetic and is not able to ambulate long distances either.  Proper set up with k5 needed to increase independence and decrease risk for further injury.    Response to treatment/recommendations: thankful    Goal attainment:  All goals met    Risks and benefits of evaluation/treatment have been explained.  Patient, family and/or caregiver are in agreement with Plan of Care.     Timed Code Treatment Minutes: 40  Total Treatment Time (sum of timed and untimed services): 40    Electronically signed by:  Nancy MACEDO, ATP      Occupational Therapist, Assistive   [phone]      fax: [phone]      [email or web address], [address]  [place]   [address]  December 19, 2024

## 2025-01-05 NOTE — PHARMACY-ADMISSION MEDICATION HISTORY
Pharmacist Admission Medication History    Admission medication history is complete. The information provided in this note is only as accurate as the sources available at the time of the update.    Medication History Completed by RN CE on 12/30/2024    Medication History Reviewed By: Rhea Cheema [facility] 1/5/2025 1:39 PM    PTA Med List   Medication Sig Last Dose/Taking    amitriptyline (ELAVIL) 25 MG tablet Take 1 tablet (25 mg) by mouth at bedtime. Taking    diclofenac (VOLTAREN) 75 MG EC tablet Take 1 tablet (75 mg) by mouth 2 times daily. Taking    levalbuterol (XOPENEX HFA) 45 MCG/ACT inhaler Inhale 2 puffs into the lungs every 4 hours as needed for shortness of breath or wheezing. Taking As Needed    losartan (COZAAR) 25 MG tablet Take 1 tablet (25 mg) by mouth daily. Taking    methocarbamol (ROBAXIN) 500 MG tablet Take 1 tablet (500 mg) by mouth 4 times daily as needed for muscle spasms. Taking As Needed    pregabalin (LYRICA) 50 MG capsule Take 1 capsule (50 mg) by mouth 3 times daily. Taking    SENNA-docusate sodium (SENNA S) 8.6-50 MG tablet Take 1 tablet by mouth 2 times daily as needed (Constipation). Taking As Needed

## 2025-01-06 ENCOUNTER — HOSPITAL ENCOUNTER (INPATIENT)
Facility: CLINIC | Age: 30
LOS: 1 days | Discharge: HOME | End: 2025-01-08
Attending: STUDENT IN AN ORGANIZED HEALTH CARE EDUCATION/TRAINING PROGRAM | Admitting: STUDENT IN AN ORGANIZED HEALTH CARE EDUCATION/TRAINING PROGRAM
Payer: COMMERCIAL

## 2025-01-06 ENCOUNTER — APPOINTMENT (OUTPATIENT)
Dept: GENERAL RADIOLOGY | Facility: CLINIC | Age: 30
End: 2025-01-06
Attending: STUDENT IN AN ORGANIZED HEALTH CARE EDUCATION/TRAINING PROGRAM
Payer: COMMERCIAL

## 2025-01-06 ENCOUNTER — ANESTHESIA (OUTPATIENT)
Dept: SURGERY | Facility: CLINIC | Age: 30
End: 2025-01-06
Payer: COMMERCIAL

## 2025-01-06 ENCOUNTER — ANESTHESIA EVENT (OUTPATIENT)
Dept: SURGERY | Facility: CLINIC | Age: 30
End: 2025-01-06
Payer: COMMERCIAL

## 2025-01-06 DIAGNOSIS — Z98.890 S/P LUMBAR LAMINECTOMY: Primary | ICD-10-CM

## 2025-01-06 LAB
ABO + RH BLD: NORMAL
ANION GAP SERPL CALCULATED.3IONS-SCNC: 13 MMOL/L (ref 7–15)
APTT PPP: 28 SECONDS (ref 22–38)
BLD GP AB SCN SERPL QL: NEGATIVE
BUN SERPL-MCNC: 10.8 MG/DL (ref 6–20)
CALCIUM SERPL-MCNC: 9 MG/DL (ref 8.8–10.4)
CHLORIDE SERPL-SCNC: 101 MMOL/L (ref 98–107)
CREAT SERPL-MCNC: 0.77 MG/DL (ref 0.67–1.17)
EGFRCR SERPLBLD CKD-EPI 2021: >90 ML/MIN/1.73M2
ERYTHROCYTE [DISTWIDTH] IN BLOOD BY AUTOMATED COUNT: 13.1 % (ref 10–15)
GLUCOSE SERPL-MCNC: 121 MG/DL (ref 70–99)
HCO3 SERPL-SCNC: 21 MMOL/L (ref 22–29)
HCT VFR BLD AUTO: 46.4 % (ref 40–53)
HGB BLD-MCNC: 15.4 G/DL (ref 13.3–17.7)
INR PPP: 1 (ref 0.85–1.15)
MCH RBC QN AUTO: 25.4 PG (ref 26.5–33)
MCHC RBC AUTO-ENTMCNC: 33.2 G/DL (ref 31.5–36.5)
MCV RBC AUTO: 77 FL (ref 78–100)
PLATELET # BLD AUTO: 305 10E3/UL (ref 150–450)
POTASSIUM SERPL-SCNC: 4.1 MMOL/L (ref 3.4–5.3)
RBC # BLD AUTO: 6.06 10E6/UL (ref 4.4–5.9)
SODIUM SERPL-SCNC: 135 MMOL/L (ref 135–145)
SPECIMEN EXP DATE BLD: NORMAL
WBC # BLD AUTO: 6.9 10E3/UL (ref 4–11)

## 2025-01-06 PROCEDURE — 999N000141 HC STATISTIC PRE-PROCEDURE NURSING ASSESSMENT: Performed by: STUDENT IN AN ORGANIZED HEALTH CARE EDUCATION/TRAINING PROGRAM

## 2025-01-06 PROCEDURE — 258N000001 HC RX 258: Performed by: STUDENT IN AN ORGANIZED HEALTH CARE EDUCATION/TRAINING PROGRAM

## 2025-01-06 PROCEDURE — 250N000013 HC RX MED GY IP 250 OP 250 PS 637

## 2025-01-06 PROCEDURE — 85610 PROTHROMBIN TIME: CPT | Performed by: STUDENT IN AN ORGANIZED HEALTH CARE EDUCATION/TRAINING PROGRAM

## 2025-01-06 PROCEDURE — 250N000011 HC RX IP 250 OP 636

## 2025-01-06 PROCEDURE — 85048 AUTOMATED LEUKOCYTE COUNT: CPT | Performed by: STUDENT IN AN ORGANIZED HEALTH CARE EDUCATION/TRAINING PROGRAM

## 2025-01-06 PROCEDURE — 63005 REMOVE SPINE LAMINA 1/2 LMBR: CPT | Mod: 22 | Performed by: STUDENT IN AN ORGANIZED HEALTH CARE EDUCATION/TRAINING PROGRAM

## 2025-01-06 PROCEDURE — 250N000009 HC RX 250: Performed by: STUDENT IN AN ORGANIZED HEALTH CARE EDUCATION/TRAINING PROGRAM

## 2025-01-06 PROCEDURE — 250N000011 HC RX IP 250 OP 636: Performed by: STUDENT IN AN ORGANIZED HEALTH CARE EDUCATION/TRAINING PROGRAM

## 2025-01-06 PROCEDURE — 360N000084 HC SURGERY LEVEL 4 W/ FLUORO, PER MIN: Performed by: STUDENT IN AN ORGANIZED HEALTH CARE EDUCATION/TRAINING PROGRAM

## 2025-01-06 PROCEDURE — 250N000009 HC RX 250: Performed by: NURSE ANESTHETIST, CERTIFIED REGISTERED

## 2025-01-06 PROCEDURE — 250N000011 HC RX IP 250 OP 636: Performed by: NURSE ANESTHETIST, CERTIFIED REGISTERED

## 2025-01-06 PROCEDURE — 258N000003 HC RX IP 258 OP 636: Performed by: ANESTHESIOLOGY

## 2025-01-06 PROCEDURE — 250N000013 HC RX MED GY IP 250 OP 250 PS 637: Performed by: ANESTHESIOLOGY

## 2025-01-06 PROCEDURE — 250N000013 HC RX MED GY IP 250 OP 250 PS 637: Performed by: PHYSICIAN ASSISTANT

## 2025-01-06 PROCEDURE — 250N000011 HC RX IP 250 OP 636: Performed by: ANESTHESIOLOGY

## 2025-01-06 PROCEDURE — 258N000003 HC RX IP 258 OP 636: Performed by: PHYSICIAN ASSISTANT

## 2025-01-06 PROCEDURE — 85027 COMPLETE CBC AUTOMATED: CPT | Performed by: STUDENT IN AN ORGANIZED HEALTH CARE EDUCATION/TRAINING PROGRAM

## 2025-01-06 PROCEDURE — 250N000011 HC RX IP 250 OP 636: Performed by: PHYSICIAN ASSISTANT

## 2025-01-06 PROCEDURE — 710N000009 HC RECOVERY PHASE 1, LEVEL 1, PER MIN: Performed by: STUDENT IN AN ORGANIZED HEALTH CARE EDUCATION/TRAINING PROGRAM

## 2025-01-06 PROCEDURE — 272N000001 HC OR GENERAL SUPPLY STERILE: Performed by: STUDENT IN AN ORGANIZED HEALTH CARE EDUCATION/TRAINING PROGRAM

## 2025-01-06 PROCEDURE — 370N000017 HC ANESTHESIA TECHNICAL FEE, PER MIN: Performed by: STUDENT IN AN ORGANIZED HEALTH CARE EDUCATION/TRAINING PROGRAM

## 2025-01-06 PROCEDURE — 250N000005 HC OR RX SURGIFLO HEMOSTATIC MATRIX 10ML 199102S OPNP: Performed by: STUDENT IN AN ORGANIZED HEALTH CARE EDUCATION/TRAINING PROGRAM

## 2025-01-06 PROCEDURE — 80048 BASIC METABOLIC PNL TOTAL CA: CPT

## 2025-01-06 PROCEDURE — 999N000179 XR SURGERY CARM FLUORO LESS THAN 5 MIN W STILLS

## 2025-01-06 PROCEDURE — 36415 COLL VENOUS BLD VENIPUNCTURE: CPT

## 2025-01-06 PROCEDURE — 01NB0ZZ RELEASE LUMBAR NERVE, OPEN APPROACH: ICD-10-PCS | Performed by: STUDENT IN AN ORGANIZED HEALTH CARE EDUCATION/TRAINING PROGRAM

## 2025-01-06 PROCEDURE — 85730 THROMBOPLASTIN TIME PARTIAL: CPT | Performed by: STUDENT IN AN ORGANIZED HEALTH CARE EDUCATION/TRAINING PROGRAM

## 2025-01-06 PROCEDURE — 250N000025 HC SEVOFLURANE, PER MIN: Performed by: STUDENT IN AN ORGANIZED HEALTH CARE EDUCATION/TRAINING PROGRAM

## 2025-01-06 PROCEDURE — 86900 BLOOD TYPING SEROLOGIC ABO: CPT

## 2025-01-06 RX ORDER — ACETAMINOPHEN 325 MG/1
975 TABLET ORAL
Status: COMPLETED | OUTPATIENT
Start: 2025-01-06 | End: 2025-01-06

## 2025-01-06 RX ORDER — FENTANYL CITRATE 50 UG/ML
25 INJECTION, SOLUTION INTRAMUSCULAR; INTRAVENOUS EVERY 5 MIN PRN
Status: DISCONTINUED | OUTPATIENT
Start: 2025-01-06 | End: 2025-01-06 | Stop reason: HOSPADM

## 2025-01-06 RX ORDER — CEFAZOLIN SODIUM/WATER 3 G/30 ML
3 SYRINGE (ML) INTRAVENOUS ONCE
Status: DISCONTINUED | OUTPATIENT
Start: 2025-01-06 | End: 2025-01-06

## 2025-01-06 RX ORDER — CEFAZOLIN SODIUM/WATER 3 G/30 ML
3 SYRINGE (ML) INTRAVENOUS SEE ADMIN INSTRUCTIONS
Status: DISCONTINUED | OUTPATIENT
Start: 2025-01-06 | End: 2025-01-06 | Stop reason: HOSPADM

## 2025-01-06 RX ORDER — OXYCODONE HYDROCHLORIDE 5 MG/1
5 TABLET ORAL EVERY 6 HOURS PRN
Qty: 40 TABLET | Refills: 0 | Status: SHIPPED | OUTPATIENT
Start: 2025-01-06 | End: 2025-01-08

## 2025-01-06 RX ORDER — HYDROMORPHONE HCL IN WATER/PF 6 MG/30 ML
0.2 PATIENT CONTROLLED ANALGESIA SYRINGE INTRAVENOUS EVERY 5 MIN PRN
Status: DISCONTINUED | OUTPATIENT
Start: 2025-01-06 | End: 2025-01-06 | Stop reason: HOSPADM

## 2025-01-06 RX ORDER — ONDANSETRON 4 MG/1
4 TABLET, ORALLY DISINTEGRATING ORAL EVERY 30 MIN PRN
Status: DISCONTINUED | OUTPATIENT
Start: 2025-01-06 | End: 2025-01-06 | Stop reason: HOSPADM

## 2025-01-06 RX ORDER — LOSARTAN POTASSIUM 25 MG/1
25 TABLET ORAL DAILY
Status: DISCONTINUED | OUTPATIENT
Start: 2025-01-07 | End: 2025-01-08 | Stop reason: HOSPADM

## 2025-01-06 RX ORDER — LIDOCAINE 40 MG/G
CREAM TOPICAL
Status: DISCONTINUED | OUTPATIENT
Start: 2025-01-06 | End: 2025-01-08 | Stop reason: HOSPADM

## 2025-01-06 RX ORDER — ACETAMINOPHEN 325 MG/1
975 TABLET ORAL EVERY 8 HOURS
Status: DISCONTINUED | OUTPATIENT
Start: 2025-01-07 | End: 2025-01-08 | Stop reason: HOSPADM

## 2025-01-06 RX ORDER — DEXAMETHASONE SODIUM PHOSPHATE 4 MG/ML
4 INJECTION, SOLUTION INTRA-ARTICULAR; INTRALESIONAL; INTRAMUSCULAR; INTRAVENOUS; SOFT TISSUE
Status: DISCONTINUED | OUTPATIENT
Start: 2025-01-06 | End: 2025-01-06 | Stop reason: HOSPADM

## 2025-01-06 RX ORDER — HYDROMORPHONE HCL IN WATER/PF 6 MG/30 ML
0.2 PATIENT CONTROLLED ANALGESIA SYRINGE INTRAVENOUS
Status: DISCONTINUED | OUTPATIENT
Start: 2025-01-06 | End: 2025-01-08 | Stop reason: HOSPADM

## 2025-01-06 RX ORDER — FENTANYL CITRATE 50 UG/ML
50 INJECTION, SOLUTION INTRAMUSCULAR; INTRAVENOUS EVERY 5 MIN PRN
Status: DISCONTINUED | OUTPATIENT
Start: 2025-01-06 | End: 2025-01-06 | Stop reason: HOSPADM

## 2025-01-06 RX ORDER — NALOXONE HYDROCHLORIDE 0.4 MG/ML
0.2 INJECTION, SOLUTION INTRAMUSCULAR; INTRAVENOUS; SUBCUTANEOUS
Status: DISCONTINUED | OUTPATIENT
Start: 2025-01-06 | End: 2025-01-08 | Stop reason: HOSPADM

## 2025-01-06 RX ORDER — LIDOCAINE 40 MG/G
CREAM TOPICAL
Status: DISCONTINUED | OUTPATIENT
Start: 2025-01-06 | End: 2025-01-06 | Stop reason: HOSPADM

## 2025-01-06 RX ORDER — ACETAMINOPHEN 325 MG/1
650 TABLET ORAL EVERY 4 HOURS PRN
COMMUNITY
Start: 2025-01-06

## 2025-01-06 RX ORDER — SODIUM CHLORIDE, SODIUM LACTATE, POTASSIUM CHLORIDE, CALCIUM CHLORIDE 600; 310; 30; 20 MG/100ML; MG/100ML; MG/100ML; MG/100ML
INJECTION, SOLUTION INTRAVENOUS CONTINUOUS
Status: DISCONTINUED | OUTPATIENT
Start: 2025-01-06 | End: 2025-01-06 | Stop reason: HOSPADM

## 2025-01-06 RX ORDER — HYDROMORPHONE HCL IN WATER/PF 6 MG/30 ML
0.4 PATIENT CONTROLLED ANALGESIA SYRINGE INTRAVENOUS
Status: DISCONTINUED | OUTPATIENT
Start: 2025-01-06 | End: 2025-01-08 | Stop reason: HOSPADM

## 2025-01-06 RX ORDER — METHOCARBAMOL 750 MG/1
750 TABLET, FILM COATED ORAL EVERY 6 HOURS PRN
Qty: 40 TABLET | Refills: 0 | Status: SHIPPED | OUTPATIENT
Start: 2025-01-06 | End: 2025-01-08

## 2025-01-06 RX ORDER — LEVALBUTEROL TARTRATE 45 UG/1
2 AEROSOL, METERED ORAL EVERY 4 HOURS PRN
Status: DISCONTINUED | OUTPATIENT
Start: 2025-01-06 | End: 2025-01-08 | Stop reason: HOSPADM

## 2025-01-06 RX ORDER — HYDROMORPHONE HYDROCHLORIDE 1 MG/ML
0.5 INJECTION, SOLUTION INTRAMUSCULAR; INTRAVENOUS; SUBCUTANEOUS ONCE
Status: COMPLETED | OUTPATIENT
Start: 2025-01-06 | End: 2025-01-06

## 2025-01-06 RX ORDER — POLYETHYLENE GLYCOL 3350 17 G/17G
17 POWDER, FOR SOLUTION ORAL DAILY
Status: DISCONTINUED | OUTPATIENT
Start: 2025-01-07 | End: 2025-01-08 | Stop reason: HOSPADM

## 2025-01-06 RX ORDER — CEFAZOLIN SODIUM IN 0.9 % NACL 3 G/100 ML
3 INTRAVENOUS SOLUTION, PIGGYBACK (ML) INTRAVENOUS ONCE
Status: COMPLETED | OUTPATIENT
Start: 2025-01-06 | End: 2025-01-06

## 2025-01-06 RX ORDER — NALOXONE HYDROCHLORIDE 0.4 MG/ML
0.1 INJECTION, SOLUTION INTRAMUSCULAR; INTRAVENOUS; SUBCUTANEOUS
Status: DISCONTINUED | OUTPATIENT
Start: 2025-01-06 | End: 2025-01-06 | Stop reason: HOSPADM

## 2025-01-06 RX ORDER — ONDANSETRON 2 MG/ML
4 INJECTION INTRAMUSCULAR; INTRAVENOUS EVERY 30 MIN PRN
Status: DISCONTINUED | OUTPATIENT
Start: 2025-01-06 | End: 2025-01-06 | Stop reason: HOSPADM

## 2025-01-06 RX ORDER — FENTANYL CITRATE 50 UG/ML
INJECTION, SOLUTION INTRAMUSCULAR; INTRAVENOUS PRN
Status: DISCONTINUED | OUTPATIENT
Start: 2025-01-06 | End: 2025-01-06

## 2025-01-06 RX ORDER — HYDROMORPHONE HCL IN WATER/PF 6 MG/30 ML
0.4 PATIENT CONTROLLED ANALGESIA SYRINGE INTRAVENOUS EVERY 5 MIN PRN
Status: DISCONTINUED | OUTPATIENT
Start: 2025-01-06 | End: 2025-01-06 | Stop reason: HOSPADM

## 2025-01-06 RX ORDER — BUPIVACAINE HYDROCHLORIDE AND EPINEPHRINE 2.5; 5 MG/ML; UG/ML
INJECTION, SOLUTION EPIDURAL; INFILTRATION; INTRACAUDAL; PERINEURAL PRN
Status: DISCONTINUED | OUTPATIENT
Start: 2025-01-06 | End: 2025-01-06 | Stop reason: HOSPADM

## 2025-01-06 RX ORDER — LIDOCAINE HYDROCHLORIDE 20 MG/ML
INJECTION, SOLUTION INFILTRATION; PERINEURAL PRN
Status: DISCONTINUED | OUTPATIENT
Start: 2025-01-06 | End: 2025-01-06

## 2025-01-06 RX ORDER — METHOCARBAMOL 750 MG/1
750 TABLET, FILM COATED ORAL EVERY 6 HOURS PRN
Status: DISCONTINUED | OUTPATIENT
Start: 2025-01-06 | End: 2025-01-08 | Stop reason: HOSPADM

## 2025-01-06 RX ORDER — BISACODYL 10 MG
10 SUPPOSITORY, RECTAL RECTAL DAILY PRN
Status: DISCONTINUED | OUTPATIENT
Start: 2025-01-09 | End: 2025-01-08 | Stop reason: HOSPADM

## 2025-01-06 RX ORDER — PROPOFOL 10 MG/ML
INJECTION, EMULSION INTRAVENOUS PRN
Status: DISCONTINUED | OUTPATIENT
Start: 2025-01-06 | End: 2025-01-06

## 2025-01-06 RX ORDER — DICLOFENAC SODIUM 75 MG/1
75 TABLET, DELAYED RELEASE ORAL 2 TIMES DAILY
Status: DISCONTINUED | OUTPATIENT
Start: 2025-01-06 | End: 2025-01-08 | Stop reason: HOSPADM

## 2025-01-06 RX ORDER — DEXAMETHASONE SODIUM PHOSPHATE 4 MG/ML
INJECTION, SOLUTION INTRA-ARTICULAR; INTRALESIONAL; INTRAMUSCULAR; INTRAVENOUS; SOFT TISSUE PRN
Status: DISCONTINUED | OUTPATIENT
Start: 2025-01-06 | End: 2025-01-06

## 2025-01-06 RX ORDER — OXYCODONE HYDROCHLORIDE 5 MG/1
5 TABLET ORAL
Status: DISCONTINUED | OUTPATIENT
Start: 2025-01-06 | End: 2025-01-06 | Stop reason: HOSPADM

## 2025-01-06 RX ORDER — DIAZEPAM 5 MG/1
5 TABLET ORAL EVERY 6 HOURS PRN
Status: DISCONTINUED | OUTPATIENT
Start: 2025-01-06 | End: 2025-01-08 | Stop reason: HOSPADM

## 2025-01-06 RX ORDER — CEFAZOLIN SODIUM/WATER 3 G/30 ML
3 SYRINGE (ML) INTRAVENOUS
Status: COMPLETED | OUTPATIENT
Start: 2025-01-06 | End: 2025-01-06

## 2025-01-06 RX ORDER — GABAPENTIN 300 MG/1
300 CAPSULE ORAL
Status: DISCONTINUED | OUTPATIENT
Start: 2025-01-06 | End: 2025-01-06 | Stop reason: HOSPADM

## 2025-01-06 RX ORDER — ONDANSETRON 2 MG/ML
INJECTION INTRAMUSCULAR; INTRAVENOUS PRN
Status: DISCONTINUED | OUTPATIENT
Start: 2025-01-06 | End: 2025-01-06

## 2025-01-06 RX ORDER — OXYCODONE HYDROCHLORIDE 5 MG/1
5 TABLET ORAL EVERY 4 HOURS PRN
Status: DISCONTINUED | OUTPATIENT
Start: 2025-01-06 | End: 2025-01-08 | Stop reason: HOSPADM

## 2025-01-06 RX ORDER — PREGABALIN 50 MG/1
50 CAPSULE ORAL 3 TIMES DAILY
Status: DISCONTINUED | OUTPATIENT
Start: 2025-01-06 | End: 2025-01-08 | Stop reason: HOSPADM

## 2025-01-06 RX ORDER — LABETALOL HYDROCHLORIDE 5 MG/ML
10 INJECTION, SOLUTION INTRAVENOUS
Status: DISCONTINUED | OUTPATIENT
Start: 2025-01-06 | End: 2025-01-06 | Stop reason: HOSPADM

## 2025-01-06 RX ORDER — AMOXICILLIN 250 MG
1 CAPSULE ORAL 2 TIMES DAILY PRN
Qty: 60 TABLET | Refills: 0 | Status: SHIPPED | OUTPATIENT
Start: 2025-01-06

## 2025-01-06 RX ORDER — PROPOFOL 10 MG/ML
INJECTION, EMULSION INTRAVENOUS CONTINUOUS PRN
Status: DISCONTINUED | OUTPATIENT
Start: 2025-01-06 | End: 2025-01-06

## 2025-01-06 RX ORDER — NALOXONE HYDROCHLORIDE 0.4 MG/ML
0.4 INJECTION, SOLUTION INTRAMUSCULAR; INTRAVENOUS; SUBCUTANEOUS
Status: DISCONTINUED | OUTPATIENT
Start: 2025-01-06 | End: 2025-01-08 | Stop reason: HOSPADM

## 2025-01-06 RX ORDER — OXYCODONE HYDROCHLORIDE 5 MG/1
10 TABLET ORAL EVERY 4 HOURS PRN
Status: DISCONTINUED | OUTPATIENT
Start: 2025-01-06 | End: 2025-01-08 | Stop reason: HOSPADM

## 2025-01-06 RX ORDER — AMOXICILLIN 250 MG
1 CAPSULE ORAL 2 TIMES DAILY
Status: DISCONTINUED | OUTPATIENT
Start: 2025-01-06 | End: 2025-01-08 | Stop reason: HOSPADM

## 2025-01-06 RX ADMIN — AMITRIPTYLINE HYDROCHLORIDE 25 MG: 25 TABLET, FILM COATED ORAL at 22:17

## 2025-01-06 RX ADMIN — FENTANYL CITRATE 50 MCG: 50 INJECTION, SOLUTION INTRAMUSCULAR; INTRAVENOUS at 16:20

## 2025-01-06 RX ADMIN — FENTANYL CITRATE 50 MCG: 50 INJECTION, SOLUTION INTRAMUSCULAR; INTRAVENOUS at 16:32

## 2025-01-06 RX ADMIN — DIAZEPAM 5 MG: 5 TABLET ORAL at 22:17

## 2025-01-06 RX ADMIN — SODIUM CHLORIDE, POTASSIUM CHLORIDE, SODIUM LACTATE AND CALCIUM CHLORIDE: 600; 310; 30; 20 INJECTION, SOLUTION INTRAVENOUS at 11:55

## 2025-01-06 RX ADMIN — CEFAZOLIN 3 G: 1 INJECTION, POWDER, FOR SOLUTION INTRAMUSCULAR; INTRAVENOUS at 20:20

## 2025-01-06 RX ADMIN — PROPOFOL 200 MG: 10 INJECTION, EMULSION INTRAVENOUS at 12:09

## 2025-01-06 RX ADMIN — PROPOFOL 50 MCG/KG/MIN: 10 INJECTION, EMULSION INTRAVENOUS at 12:37

## 2025-01-06 RX ADMIN — PROPOFOL 50 MG: 10 INJECTION, EMULSION INTRAVENOUS at 12:36

## 2025-01-06 RX ADMIN — HYDROMORPHONE HYDROCHLORIDE 0.4 MG: 0.2 INJECTION, SOLUTION INTRAMUSCULAR; INTRAVENOUS; SUBCUTANEOUS at 17:13

## 2025-01-06 RX ADMIN — PREGABALIN 50 MG: 50 CAPSULE ORAL at 20:20

## 2025-01-06 RX ADMIN — HYDROMORPHONE HYDROCHLORIDE 1 MG: 1 INJECTION, SOLUTION INTRAMUSCULAR; INTRAVENOUS; SUBCUTANEOUS at 14:54

## 2025-01-06 RX ADMIN — SODIUM CHLORIDE, POTASSIUM CHLORIDE, SODIUM LACTATE AND CALCIUM CHLORIDE: 600; 310; 30; 20 INJECTION, SOLUTION INTRAVENOUS at 13:46

## 2025-01-06 RX ADMIN — MIDAZOLAM 2 MG: 1 INJECTION INTRAMUSCULAR; INTRAVENOUS at 11:59

## 2025-01-06 RX ADMIN — DEXAMETHASONE SODIUM PHOSPHATE 8 MG: 4 INJECTION, SOLUTION INTRA-ARTICULAR; INTRALESIONAL; INTRAMUSCULAR; INTRAVENOUS; SOFT TISSUE at 12:09

## 2025-01-06 RX ADMIN — DICLOFENAC SODIUM 75 MG: 75 TABLET, DELAYED RELEASE ORAL at 20:20

## 2025-01-06 RX ADMIN — ONDANSETRON 4 MG: 2 INJECTION INTRAMUSCULAR; INTRAVENOUS at 12:28

## 2025-01-06 RX ADMIN — SUGAMMADEX 200 MG: 100 INJECTION, SOLUTION INTRAVENOUS at 15:09

## 2025-01-06 RX ADMIN — HYDROMORPHONE HYDROCHLORIDE 0.4 MG: 0.2 INJECTION, SOLUTION INTRAMUSCULAR; INTRAVENOUS; SUBCUTANEOUS at 18:49

## 2025-01-06 RX ADMIN — LIDOCAINE HYDROCHLORIDE 50 MG: 20 INJECTION, SOLUTION INFILTRATION; PERINEURAL at 12:09

## 2025-01-06 RX ADMIN — HYDROMORPHONE HYDROCHLORIDE 1 MG: 1 INJECTION, SOLUTION INTRAMUSCULAR; INTRAVENOUS; SUBCUTANEOUS at 13:46

## 2025-01-06 RX ADMIN — ACETAMINOPHEN 975 MG: 325 TABLET, FILM COATED ORAL at 16:02

## 2025-01-06 RX ADMIN — HYDROMORPHONE HYDROCHLORIDE 0.4 MG: 0.2 INJECTION, SOLUTION INTRAMUSCULAR; INTRAVENOUS; SUBCUTANEOUS at 17:04

## 2025-01-06 RX ADMIN — PREGABALIN 50 MG: 50 CAPSULE ORAL at 18:57

## 2025-01-06 RX ADMIN — HYDROMORPHONE HYDROCHLORIDE 1 MG: 1 INJECTION, SOLUTION INTRAMUSCULAR; INTRAVENOUS; SUBCUTANEOUS at 12:30

## 2025-01-06 RX ADMIN — Medication 3 G: at 12:00

## 2025-01-06 RX ADMIN — METHOCARBAMOL 750 MG: 750 TABLET ORAL at 17:31

## 2025-01-06 RX ADMIN — ROCURONIUM BROMIDE 50 MG: 50 INJECTION, SOLUTION INTRAVENOUS at 12:09

## 2025-01-06 RX ADMIN — FENTANYL CITRATE 100 MCG: 50 INJECTION INTRAMUSCULAR; INTRAVENOUS at 12:09

## 2025-01-06 ASSESSMENT — ACTIVITIES OF DAILY LIVING (ADL)
ADLS_ACUITY_SCORE: 39
ADLS_ACUITY_SCORE: 42
ADLS_ACUITY_SCORE: 44
ADLS_ACUITY_SCORE: 42
ADLS_ACUITY_SCORE: 44
ADLS_ACUITY_SCORE: 42

## 2025-01-06 ASSESSMENT — ENCOUNTER SYMPTOMS: DYSRHYTHMIAS: 0

## 2025-01-06 ASSESSMENT — LIFESTYLE VARIABLES: TOBACCO_USE: 1

## 2025-01-06 NOTE — ANESTHESIA POSTPROCEDURE EVALUATION
Patient: Mario [place]    Procedure: Procedure(s):  Lumbar 3-4, 4-5 Laminectomy,       Anesthesia Type:  General    Note:  Disposition: Inpatient   Postop Pain Control:    PONV: No   Neuro/Psych: Uneventful            Sign Out: Acceptable/Baseline neuro status   Airway/Respiratory: Uneventful            Sign Out: Acceptable/Baseline resp. status   CV/Hemodynamics: Uneventful            Sign Out: Acceptable CV status; No obvious hypovolemia; No obvious fluid overload   Other NRE:    DID A NON-ROUTINE EVENT OCCUR? No           Last vitals:  Vitals Value Taken Time   /68 01/06/25 1600   Temp 97  F (36.1  C) 01/06/25 1530   Pulse 105 01/06/25 1603   Resp 24 01/06/25 1603   SpO2 96 % 01/06/25 1603   Vitals shown include unfiled device data.    Electronically Signed By: Ruthy Escudero MD  January 6, 2025  4:05 PM

## 2025-01-06 NOTE — ANESTHESIA CARE TRANSFER NOTE
Patient: Mario Calderon    Procedure: Procedure(s):  Lumbar 3-4, 4-5 Laminectomy,       Diagnosis: Lumbar radiculopathy [M54.16]  Diagnosis Additional Information: No value filed.    Anesthesia Type:   General     Note:    Oropharynx: spontaneously breathing  Level of Consciousness: awake  Oxygen Supplementation: face mask    Independent Airway: airway patency satisfactory and stable  Dentition: dentition unchanged  Vital Signs Stable: post-procedure vital signs reviewed and stable  Report to RN Given: handoff report given  Patient transferred to: PACU    Handoff Report: Identifed the Patient, Identified the Reponsible Provider, Reviewed the pertinent medical history, Discussed the surgical course, Reviewed Intra-OP anesthesia mangement and issues during anesthesia, Set expectations for post-procedure period and Allowed opportunity for questions and acknowledgement of understanding      Vitals:  Vitals Value Taken Time   BP     Temp     Pulse     Resp     SpO2         Electronically Signed By: BROCK Morse CRNA  January 6, 2025  3:21 PM

## 2025-01-06 NOTE — OP NOTE
Location: Main OR  Attending Surgeon: Clark Pryor MD  Assistant Surgeon: Halle Key NP  Preprocedure diagnosis: Lumbar stenosis with neurogenic claudication  Postprocedure diagnosis: Same  Procedure:  1.  Lumbar 3-4 and 4-5 laminectomy with 22 Modifier for increased time due to body habitus  2.  Use of intraoperative fluoroscopy  3.  Use of intraoperative microscope  Indications: 29-year-old male with low back pain radiating to the bilateral lower extremities with severe central canal stenosis at L3-4 and L4-5.  There are large calcified disks at these levels with severe stenosis causing lower extremity symptoms and chronic bladder issues.  Preprocedure details:  Patient was brought to main operating, intubated placed under general anesthesia.  He is placed in the operating table in prone position with all pressure points padded.  A midline lumbar incision was marked out.  He is cleaned prepped in sterile fashion.  Local anesthetic was injected following a timeout.  Using a 10 blade an incision was made through the skin.  We then used monopolar cautery dissect down to the midline of the spinous processes of L3-L5 verifying our levels using fluoroscopy.  We then perform subperiosteal dissection bilaterally at these levels.  We remove the spinous processes at L3 and L4 using a Leksell rongeur and then used a high-speed drill to create troughs in the lamina bilaterally at L3-4 and L4-5.  We then used a Leksell rongeur to remove the remaining medial bone and then entered the epidural space was a nerve hook.  Ligamentum flavum and further bone was removed using punch rongeurs at these levels.  We then dissected laterally to the disc bases and found significant stenosis due to calcified disc herniations.  The operating microscope was brought in.  We are not able to get in with these due to the severe calcification and were not able to get lateral enough to access the full disc.  We then irrigated with normal  saline.  A Hemovac drain was placed in the wound and tunneled out laterally.  We then closed the muscle and fascial layers using 0 Vicryl sutures.  Subcutaneous layer was closing 2-0 Vicryl sutures followed by 3-0 Vicryl sutures for the dermal layer and staples for the skin.  All counts were correct at the end the procedure.  The patient tolerated procedure well.  He is extubated and transferred the PACU in stable condition.  I was present for the entire procedure.  This procedure was more difficult due to the patient's body habitus requiring 22 modifier.  Halle Key NP was present for the opening closure and retraction as no qualified resident was immediately available.    Clark Pryor MD

## 2025-01-06 NOTE — PROGRESS NOTES
POD0 L3-4, L4-5 laminectomy    Plan:  -Admit to floor   -Pain control measures  -Hemovac drain, monitor and record output even if 0  -Abx x 1 dose   -Palacios  -PT/OT    Halle Key CNP  [facility]  [address] [place] [id number]  Tel [phone]  Fax [phone]

## 2025-01-06 NOTE — ANESTHESIA PROCEDURE NOTES
Airway       Patient location during procedure: OR       Procedure Start/Stop Times: 1/6/2025 12:12 PM  Staff -        CRNA: Sherri Bravo APRN CRNA       Performed By: CRNAIndications and Patient Condition       Indications for airway management: martha-procedural       Induction type:intravenous       Mask difficulty assessment: 1 - vent by mask    Final Airway Details       Final airway type: endotracheal airway       Successful airway: ETT - single  Endotracheal Airway Details        ETT size (mm): 8.0       Cuffed: yes       Cuff volume (mL): 8       Successful intubation technique: video laryngoscopy       VL Blade Size: Glidescope 3       Grade View of Cords: 1       Position: Center       Measured from: lips       Secured at (cm): 25       Bite block used: Soft    Post intubation assessment        Placement verified by: capnometry, equal breath sounds and chest rise        Number of attempts at approach: 1       Number of other approaches attempted: 0       Secured with: tape       Ease of procedure: easy       Dentition: Intact and Unchanged    Medication(s) Administered   Medication Administration Time: 1/6/2025 12:12 PM

## 2025-01-06 NOTE — ANESTHESIA PREPROCEDURE EVALUATION
Anesthesia Pre-Procedure Evaluation    Patient: Mario Calderon   MRN: [id number] : 1995        Procedure : Procedure(s):  Lumbar 3-4, 4-5 Laminectomy,  Possible Discectomy          Past Medical History:   Diagnosis Date    ADD (attention deficit disorder)     Allergic rhinitis due to animal dander     Allergy to mold spores     12 skin tests pos. for:  dog(+)/DM/M/T/G/W    Asthma, exercise induced     excersise induced    Depressive disorder     Diagnostic skin and sensitization tests 12 skin tests pos. for:  dog(+)/DM/M/T/G/W    Essential hypertension with goal blood pressure less than 140/90 2017    House dust mite allergy     Obese     Seasonal allergic conjunctivitis     Seasonal allergic rhinitis       Past Surgical History:   Procedure Laterality Date    AMPUTATION      APPENDECTOMY      PE TUBES      ZZC HAND/FINGER SURGERY UNLISTED      ZZC TOTAL KNEE ARTHROPLASTY        No Known Allergies   Social History     Tobacco Use    Smoking status: Every Day     Current packs/day: 0.25     Average packs/day: 0.3 packs/day for 20.0 years (5.0 ttl pk-yrs)     Types: Cigarettes     Start date: 2005    Smokeless tobacco: Never   Substance Use Topics    Alcohol use: No      Wt Readings from Last 1 Encounters:   24 141.1 kg (311 lb)        Anesthesia Evaluation   Pt has had prior anesthetic. Type: General.    No history of anesthetic complications       ROS/MED HX  ENT/Pulmonary:     (+)                tobacco use, Current use,    Intermittent, asthma  Treatment: Inhaler prn,              (-) recent URI   Neurologic:  - neg neurologic ROS     Cardiovascular:     (+)  hypertension- -   -  - -                                   (-) arrhythmias and valvular problems/murmurs   METS/Exercise Tolerance:     Hematologic:       Musculoskeletal: Comment: Lumbar radiculopathy      GI/Hepatic:  - neg GI/hepatic ROS  (-) GERD   Renal/Genitourinary:  - neg Renal ROS     Endo:     (+)  "              Obesity,       Psychiatric/Substance Use:     (+) psychiatric history anxiety and depression       Infectious Disease:       Malignancy:       Other:            Physical Exam    Airway        Mallampati: III   TM distance: > 3 FB   Neck ROM: full   Mouth opening: > 3 cm    Respiratory Devices and Support         Dental       (+) Modest Abnormalities - crowns, retainers, 1 or 2 missing teeth      Cardiovascular   cardiovascular exam normal          Pulmonary   pulmonary exam normal                OUTSIDE LABS:  CBC:   Lab Results   Component Value Date    WBC 10.0 09/29/2024    WBC 8.2 09/28/2024    HGB 14.0 12/17/2024    HGB 14.4 09/29/2024    HCT 43.4 09/29/2024    HCT 42.9 09/28/2024     12/17/2024     09/29/2024     BMP:   Lab Results   Component Value Date     12/17/2024     10/09/2024    POTASSIUM 4.0 12/17/2024    POTASSIUM 3.6 10/09/2024    CHLORIDE 99 12/17/2024    CHLORIDE 105 10/09/2024    CO2 21 (L) 12/17/2024    CO2 24 10/09/2024    BUN 12.6 12/17/2024    BUN 12.4 10/09/2024    CR 0.90 12/17/2024    CR 0.84 10/09/2024     (H) 12/17/2024     (H) 10/09/2024     COAGS:   Lab Results   Component Value Date    PTT 27 09/27/2024    INR 1.02 09/27/2024     POC: No results found for: \"BGM\", \"HCG\", \"HCGS\"  HEPATIC:   Lab Results   Component Value Date    ALBUMIN 4.2 09/27/2024    PROTTOTAL 8.2 09/27/2024    ALT 45 09/27/2024    AST 31 09/27/2024    ALKPHOS 69 09/27/2024    BILITOTAL 0.4 09/27/2024     OTHER:   Lab Results   Component Value Date    A1C 6.4 (H) 12/02/2024    LISA 9.4 12/17/2024       Anesthesia Plan    ASA Status:  3    NPO Status:  NPO Appropriate    Anesthesia Type: General.     - Airway: ETT   Induction: Intravenous.   Maintenance: Balanced.   Techniques and Equipment:     - Airway: Video-Laryngoscope       Consents    Anesthesia Plan(s) and associated risks, benefits, and realistic alternatives discussed. Questions answered and " patient/representative(s) expressed understanding.     - Discussed:     - Discussed with:  Patient      - Extended Intubation/Ventilatory Support Discussed: No.      - Patient is DNR/DNI Status: No     Use of blood products discussed: No .     Postoperative Care    Pain management: IV analgesics, Oral pain medications, Multi-modal analgesia.   PONV prophylaxis: Ondansetron (or other 5HT-3), Dexamethasone or Solumedrol     Comments:               Ruthy Escudero MD    I have reviewed the pertinent notes and labs in the chart from the past 30 days and (re)examined the patient.  Any updates or changes from those notes are reflected in this note.               # Hypertension: Noted on problem list           # Severe Obesity: Estimated body mass index is 42.18 kg/m  as calculated from the following:    Height as of 12/17/24: 1.829 m (6').    Weight as of 12/17/24: 141.1 kg (311 lb).       # Financial/Environmental Concerns:    # Asthma: noted on problem list

## 2025-01-07 ENCOUNTER — APPOINTMENT (OUTPATIENT)
Dept: PHYSICAL THERAPY | Facility: CLINIC | Age: 30
End: 2025-01-07
Payer: COMMERCIAL

## 2025-01-07 ENCOUNTER — APPOINTMENT (OUTPATIENT)
Dept: OCCUPATIONAL THERAPY | Facility: CLINIC | Age: 30
DRG: 516 | End: 2025-01-07
Payer: COMMERCIAL

## 2025-01-07 ENCOUNTER — MYC MEDICAL ADVICE (OUTPATIENT)
Dept: NEUROSURGERY | Facility: CLINIC | Age: 30
End: 2025-01-07
Payer: COMMERCIAL

## 2025-01-07 PROCEDURE — 250N000013 HC RX MED GY IP 250 OP 250 PS 637

## 2025-01-07 PROCEDURE — 120N000001 HC R&B MED SURG/OB

## 2025-01-07 PROCEDURE — 250N000011 HC RX IP 250 OP 636

## 2025-01-07 PROCEDURE — 250N000013 HC RX MED GY IP 250 OP 250 PS 637: Performed by: INTERNAL MEDICINE

## 2025-01-07 PROCEDURE — 97161 PT EVAL LOW COMPLEX 20 MIN: CPT | Mod: GP

## 2025-01-07 PROCEDURE — 250N000013 HC RX MED GY IP 250 OP 250 PS 637: Performed by: PHYSICIAN ASSISTANT

## 2025-01-07 PROCEDURE — 97165 OT EVAL LOW COMPLEX 30 MIN: CPT | Mod: GO

## 2025-01-07 PROCEDURE — 97530 THERAPEUTIC ACTIVITIES: CPT | Mod: GO

## 2025-01-07 PROCEDURE — 97530 THERAPEUTIC ACTIVITIES: CPT | Mod: GP

## 2025-01-07 RX ORDER — CALCIUM CARBONATE 500 MG/1
500 TABLET, CHEWABLE ORAL DAILY PRN
Status: DISCONTINUED | OUTPATIENT
Start: 2025-01-07 | End: 2025-01-08 | Stop reason: HOSPADM

## 2025-01-07 RX ADMIN — CALCIUM CARBONATE (ANTACID) CHEW TAB 500 MG 500 MG: 500 CHEW TAB at 03:25

## 2025-01-07 RX ADMIN — LOSARTAN POTASSIUM 25 MG: 25 TABLET, FILM COATED ORAL at 08:49

## 2025-01-07 RX ADMIN — HYDROMORPHONE HYDROCHLORIDE 0.4 MG: 0.2 INJECTION, SOLUTION INTRAMUSCULAR; INTRAVENOUS; SUBCUTANEOUS at 14:13

## 2025-01-07 RX ADMIN — PREGABALIN 50 MG: 50 CAPSULE ORAL at 21:10

## 2025-01-07 RX ADMIN — SENNOSIDES AND DOCUSATE SODIUM 1 TABLET: 50; 8.6 TABLET ORAL at 21:07

## 2025-01-07 RX ADMIN — METHOCARBAMOL 750 MG: 750 TABLET ORAL at 22:47

## 2025-01-07 RX ADMIN — OXYCODONE HYDROCHLORIDE 10 MG: 5 TABLET ORAL at 13:06

## 2025-01-07 RX ADMIN — HYDROMORPHONE HYDROCHLORIDE 0.4 MG: 0.2 INJECTION, SOLUTION INTRAMUSCULAR; INTRAVENOUS; SUBCUTANEOUS at 10:49

## 2025-01-07 RX ADMIN — ACETAMINOPHEN 975 MG: 325 TABLET, FILM COATED ORAL at 02:25

## 2025-01-07 RX ADMIN — METHOCARBAMOL 750 MG: 750 TABLET ORAL at 00:28

## 2025-01-07 RX ADMIN — HYDROMORPHONE HYDROCHLORIDE 0.4 MG: 0.2 INJECTION, SOLUTION INTRAMUSCULAR; INTRAVENOUS; SUBCUTANEOUS at 06:35

## 2025-01-07 RX ADMIN — OXYCODONE HYDROCHLORIDE 10 MG: 5 TABLET ORAL at 18:41

## 2025-01-07 RX ADMIN — AMITRIPTYLINE HYDROCHLORIDE 25 MG: 25 TABLET, FILM COATED ORAL at 21:09

## 2025-01-07 RX ADMIN — DIAZEPAM 5 MG: 5 TABLET ORAL at 11:46

## 2025-01-07 RX ADMIN — HYDROMORPHONE HYDROCHLORIDE 0.4 MG: 0.2 INJECTION, SOLUTION INTRAMUSCULAR; INTRAVENOUS; SUBCUTANEOUS at 00:22

## 2025-01-07 RX ADMIN — ACETAMINOPHEN 975 MG: 325 TABLET, FILM COATED ORAL at 17:37

## 2025-01-07 RX ADMIN — SENNOSIDES AND DOCUSATE SODIUM 1 TABLET: 50; 8.6 TABLET ORAL at 08:49

## 2025-01-07 RX ADMIN — DIAZEPAM 5 MG: 5 TABLET ORAL at 04:50

## 2025-01-07 RX ADMIN — PREGABALIN 50 MG: 50 CAPSULE ORAL at 08:49

## 2025-01-07 RX ADMIN — METHOCARBAMOL 750 MG: 750 TABLET ORAL at 13:06

## 2025-01-07 RX ADMIN — METHOCARBAMOL 750 MG: 750 TABLET ORAL at 06:51

## 2025-01-07 RX ADMIN — PREGABALIN 50 MG: 50 CAPSULE ORAL at 14:13

## 2025-01-07 RX ADMIN — OXYCODONE HYDROCHLORIDE 10 MG: 5 TABLET ORAL at 08:49

## 2025-01-07 RX ADMIN — ACETAMINOPHEN 975 MG: 325 TABLET, FILM COATED ORAL at 10:10

## 2025-01-07 RX ADMIN — POLYETHYLENE GLYCOL 3350 17 G: 17 POWDER, FOR SOLUTION ORAL at 08:49

## 2025-01-07 RX ADMIN — HYDROMORPHONE HYDROCHLORIDE 0.4 MG: 0.2 INJECTION, SOLUTION INTRAMUSCULAR; INTRAVENOUS; SUBCUTANEOUS at 02:30

## 2025-01-07 RX ADMIN — DICLOFENAC SODIUM 75 MG: 75 TABLET, DELAYED RELEASE ORAL at 21:08

## 2025-01-07 RX ADMIN — DICLOFENAC SODIUM 75 MG: 75 TABLET, DELAYED RELEASE ORAL at 08:53

## 2025-01-07 ASSESSMENT — ACTIVITIES OF DAILY LIVING (ADL)
ADLS_ACUITY_SCORE: 44
ADLS_ACUITY_SCORE: 44
ADLS_ACUITY_SCORE: 43
ADLS_ACUITY_SCORE: 44
ADLS_ACUITY_SCORE: 43
ADLS_ACUITY_SCORE: 43
ADLS_ACUITY_SCORE: 44
ADLS_ACUITY_SCORE: 43
ADLS_ACUITY_SCORE: 44
ADLS_ACUITY_SCORE: 44
ADLS_ACUITY_SCORE: 43
ADLS_ACUITY_SCORE: 44
ADLS_ACUITY_SCORE: 43
ADLS_ACUITY_SCORE: 44
ADLS_ACUITY_SCORE: 43

## 2025-01-07 NOTE — PROGRESS NOTES
01/07/25 1403   Appointment Info   Signing Clinician's Name / Credentials (PT) Yvonne Cotton DPT   Living Environment   People in Home friend(s)   Current Living Arrangements other (see comments)  ([facility])   Home Accessibility stairs within home;stairs to enter home   Number of Stairs, Main Entrance 1   Stair Railings, Main Entrance none   Number of Stairs, Within Home, Primary greater than 10 stairs   Stair Railings, Within Home, Primary railings safe and in good condition   Transportation Anticipated family or friend will provide;car, drives self   Living Environment Comments Pt lives with a friend and his friend's mother. All needs met on one level   Self-Care   Usual Activity Tolerance moderate   Current Activity Tolerance fair   Regular Exercise No   Equipment Currently Used at Home wheelchair, manual;walker, rolling;other (see comments)  (knee scooter)   Fall history within last six months no   Activity/Exercise/Self-Care Comment Pt reports he braces self on walls at home, uses WC in community. Pt reports able to don/doff prosthetic IND.   General Information   Onset of Illness/Injury or Date of Surgery 01/06/25   Referring Physician Halle Key APRN CNP   Patient/Family Therapy Goals Statement (PT) Return home   Pertinent History of Current Problem (include personal factors and/or comorbidities that impact the POC) Pt is a 30 y/o male POD#1 s/p L3-4 and L4-5 laminectomy   Existing Precautions/Restrictions fall;spinal   Cognition   Affect/Mental Status (Cognition) WFL   Orientation Status (Cognition) oriented x 4   Follows Commands (Cognition) WFL   Pain Assessment   Patient Currently in Pain   (Pt reports moderate pain in back)   Integumentary/Edema   Integumentary/Edema Comments Incision covered, hemovac in place. R BKA amputation.   Posture    Posture Forward head position;Protracted shoulders   Range of Motion (ROM)   Range of Motion ROM deficits secondary to surgical procedure;ROM deficits  secondary to pain   Strength (Manual Muscle Testing)   Strength (Manual Muscle Testing) Deficits observed during functional mobility   Bed Mobility   Comment, (Bed Mobility) Sidelying to sit SBA   Transfers   Comment, (Transfers) Sit to stand Min A   Gait/Stairs (Locomotion)   Comment, (Gait/Stairs) Pt amb w/ FWW and Min A   Balance   Balance Comments Good seated and fair standing w/ FWW   Sensory Examination   Sensory Perception patient reports no sensory changes   Clinical Impression   Criteria for Skilled Therapeutic Intervention Yes, treatment indicated   PT Diagnosis (PT) Impaired functional mobility and gait   Influenced by the following impairments Pain, weakness, decreased activity tolerance, impaired balance   Functional limitations due to impairments Limited functional mobility requiring AD and assist   Clinical Presentation (PT Evaluation Complexity) stable   Clinical Presentation Rationale Based on PMH, current status, and social support   Clinical Decision Making (Complexity) low complexity   Planned Therapy Interventions (PT) balance training;bed mobility training;gait training;stair training;transfer training;progressive activity/exercise;strengthening   Risk & Benefits of therapy have been explained evaluation/treatment results reviewed;care plan/treatment goals reviewed;risks/benefits reviewed;current/potential barriers reviewed;participants voiced agreement with care plan;participants included;patient   PT Total Evaluation Time   PT Eval, Low Complexity Minutes (10501) 10   Physical Therapy Goals   PT Frequency Daily   PT Predicted Duration/Target Date for Goal Attainment 01/14/25   PT Goals Bed Mobility;Transfers;Gait;Stairs   PT: Bed Mobility Independent;Supine to/from sit;Within precautions   PT: Transfers Modified independent;Sit to/from stand;Assistive device   PT: Gait Supervision/stand-by assist;Assistive device;50 feet   PT: Stairs Minimal assist;1 stair   Interventions   Interventions  Quick Adds Gait Training;Therapeutic Activity   Therapeutic Activity   Therapeutic Activities: dynamic activities to improve functional performance Minutes (65889) 18   Symptoms Noted During/After Treatment Increased pain;Fatigue   Treatment Detail/Skilled Intervention Pt sidelying in bed upon therapist arrival, agreeable to PT. Reports he feels better than this morning, but still painful. Pt sitting up, reporting increased pain. Able to reposition hips. Pt requiring assist to don prosthetic. Pt standing w/ FWW. Amb 40' w/ FWW and CGA-Min A. Pt demos slow speed and fair stability. VCs for picking up prosthetic. Pt reports fatigue and pain, sat EOB. Increased pain in sitting. Prosthetic removed, pt transferred to sidelying w/ SBA. Pt able to reposition in bed. Pillow support, ice pack, all needs w/in reach, RN updated.   PT Discharge Planning   PT Plan Progress bed mob, transfers, amb w/ FWW   PT Discharge Recommendation (DC Rec) home with assist   PT Rationale for DC Rec Pt below baseline, currently Ax1. Anticipate w/ further IP PT pt will progress and meet goals. Recommend pt use AD at home and have assist for heavier ADLs.   PT Brief overview of current status Ax1 w/ FWW. Goals of therapy will be to address safe mobility and make recs for d/c to next level of care. Pt and RN will continue to follow all falls risk precautions as documented by RN staff while hospitalized   PT Total Distance Amb During Session (feet) 40   PT Equipment Needed at Discharge walker, standard   Physical Therapy Time and Intention   Timed Code Treatment Minutes 18   Total Session Time (sum of timed and untimed services) 28

## 2025-01-07 NOTE — TELEPHONE ENCOUNTER
Writer called back to discuss why paperwork hasn't been signed for disability compensation.  Writer explained that Dr Pryor will be in on Thursday 1/9/25 and will get a signature and fax all necessary paperwork to [employer]  Phone number [phone] and Fax [phone] at [facility].  Patient thanked writer end ended the call.  Forms are in provider folder for review.     ANDREW Allan., JEANNE ([facility])

## 2025-01-07 NOTE — PROGRESS NOTES
01/07/25 0800   Appointment Info   Signing Clinician's Name / Credentials (OT) Jami Rincon, OTD, OTR/L   Rehab Comments (OT) Spinal precautions   Quick Adds   Quick Adds Certification   Living Environment   People in Home friend(s)   Current Living Arrangements other (see comments)  ([facility])   Home Accessibility stairs within home;stairs to enter home   Number of Stairs, Main Entrance 1   Stair Railings, Main Entrance none   Number of Stairs, Within Home, Primary greater than 10 stairs   Stair Railings, Within Home, Primary railings safe and in good condition   Transportation Anticipated family or friend will provide;car, drives self   Living Environment Comments Pt currently lives with a friend in his [facility], pt lives in bottom level and has full flight downstairs. Walk in shower.   Self-Care   Usual Activity Tolerance good   Current Activity Tolerance poor   Regular Exercise No   Equipment Currently Used at Home wheelchair, manual;walker, rolling;other (see comments)  (knee scooter for within the home as needed)   Fall history within last six months no   Activity/Exercise/Self-Care Comment Pt ind at baseline with ADLs, able to doff/don prosthetic on RLE independently, complete all ADLs at ind level.   Instrumental Activities of Daily Living (IADL)   IADL Comments Pt mostly independent with IADLs, does have support from friend for home mgmt and taxing IADLs. normally ind with driving and working. Per pt report has not been able to work for last few months due to pain and BLE weakness/numbness.   General Information   Onset of Illness/Injury or Date of Surgery 01/06/25   Referring Physician Halle Key APRN CNP   Patient/Family Therapy Goal Statement (OT) Return home and be ind.   Additional Occupational Profile Info/Pertinent History of Current Problem POD 1 L3-4, L4-5 laminectomy   Existing Precautions/Restrictions fall;spinal   General Observations and Info Pt with increased pain at eval, limiting  participation.   Cognitive Status Examination   Orientation Status orientation to person, place and time   Visual Perception   Visual Impairment/Limitations WFL   Sensory   Sensory Comments Pt reports baseline for last few months of numbness/tingling in BLEs with L worse than R.   Posture   Posture not impaired   Range of Motion Comprehensive   Comment, General Range of Motion BUE WFL   Strength Comprehensive (MMT)   Comment, General Manual Muscle Testing (MMT) Assessment BUE WFL, general weakness s/p surgical intervention   Coordination   Coordination Comments WFL   Bed Mobility   Comment (Bed Mobility) Min A   Transfers   Transfer Comments Unable to complete OOB mobility or transfers 2/2 pain   Balance   Balance Comments Unable to complete OOB mobility or transfers 2/2 pain   Activities of Daily Living   BADL Assessment/Intervention bathing;lower body dressing;grooming;toileting   Bathing Assessment/Intervention   Comment, (Bathing) Anticipate mod A and AE/DME   Lower Body Dressing Assessment/Training   Comment, (Lower Body Dressing) Anticipate mod A and AE/DME   Grooming Assessment/Training   Comment, (Grooming) Anticipate SBA   Toileting   Comment, (Toileting) Anticipate mod A and AE/DME   Clinical Impression   Criteria for Skilled Therapeutic Interventions Met (OT) Yes, treatment indicated   OT Diagnosis Impaired ADLS and IADLs   Influenced by the following impairments s/p lumbar laminectomy   OT Problem List-Impairments impacting ADL problems related to;activity tolerance impaired;mobility;strength;sensation;pain;post-surgical precautions   Assessment of Occupational Performance 3-5 Performance Deficits   Identified Performance Deficits dressing, bathing, toielting, home mgmt   Planned Therapy Interventions (OT) ADL retraining;strengthening;transfer training;progressive activity/exercise   Clinical Decision Making Complexity (OT) problem focused assessment/low complexity   Risk & Benefits of therapy have been  explained evaluation/treatment results reviewed;care plan/treatment goals reviewed;risks/benefits reviewed;current/potential barriers reviewed;participants voiced agreement with care plan;participants included;patient   OT Total Evaluation Time   OT Eval, Low Complexity Minutes (32468) 7   Therapy Certification   Medical Diagnosis s/p lumbar laminectomy   Start of Care Date 01/07/25   Certification date from 01/07/25   Certification date to 01/14/25   OT Goals   Therapy Frequency (OT) Daily   OT Predicted Duration/Target Date for Goal Attainment 01/14/25   OT Goals Hygiene/Grooming;Lower Body Dressing;Upper Body Dressing;Lower Body Bathing;Toilet Transfer/Toileting   OT: Hygiene/Grooming modified independent;using adaptive equipment;within precautions;while standing   OT: Upper Body Dressing Modified independent;within precautions;including set-up/clothing retrieval   OT: Lower Body Dressing Modified independent;using adaptive equipment;within precautions;including set-up/clothing retrieval   OT: Lower Body Bathing Modified independent;using adaptive equipment;with precautions   OT: Toilet Transfer/Toileting Modified independent;toilet transfer;cleaning and garment management;using adaptive equipment;within precautions   Interventions   Interventions Quick Adds Therapeutic Activity   Therapeutic Activities   Therapeutic Activity Minutes (63132) 15   Symptoms noted during/after treatment increased pain   Treatment Detail/Skilled Intervention Pt greeted supine in bed and agreeable to therapy session. Pt with 5/10 pain before trial of activity, educated on proper body mechancis and log rolling technique. Pt elevated head of bed slightly to assist for transfer with use of bed rails pt able to tx to EOB with min A and increased effort. Pt with increased pain during tx and tolerated sitting EOB for ~5 minutes before requesting to return to supine with CGA. Pt unable to trial further mobility or ADLs due to pain. Left with  all immediate needs met and call light within reach, RN updated.   OT Discharge Planning   OT Plan progress toilet tx, LB dressing, bring proper body mechanics hand out, seated g/h   OT Discharge Recommendation (DC Rec) home with assist   OT Rationale for DC Rec Pt significantly below baseline in ADLs and functional mobility. Pt very limited by pain during evaluation which limited ability to fully particiapte in further ADLs or ambulation. At baseline pt independent with most A/IADLs. Anticipate with pain management and additional IP therapy sessions pt would be able to return home with assist for taxing IADLs. Will conitnue to follow.   OT Brief overview of current status Increased pain, tolerated sitting EOB   OT Total Distance Amb During Session (feet) 0   OT Equipment Needed at Discharge   (TBD)   Total Session Time   Timed Code Treatment Minutes 15   Total Session Time (sum of timed and untimed services) 22   [facility]                                                                                   OUTPATIENT OCCUPATIONAL THERAPY    PLAN OF TREATMENT FOR OUTPATIENT REHABILITATION   Patient's Last Name, First Name, M.IMario Awad YOB: 1995   Provider's Name   [facility]   Medical Record No.  [id number]     Onset Date: 01/06/25 Start of Care Date: 01/07/25     Medical Diagnosis:  s/p lumbar laminectomy               OT Diagnosis:  Impaired ADLS and IADLs Certification Dates:  From: 01/07/25  To: 01/14/25     See note for plan of treatment, functional goals, and certification details.    I CERTIFY THE NEED FOR THESE SERVICES FURNISHED UNDER        THIS PLAN OF TREATMENT AND WHILE UNDER MY CARE (Physician co-signature of this document indicates review and certification of the therapy plan).

## 2025-01-07 NOTE — PROGRESS NOTES
Neurosurgery Progress Note:     POD#1 s/p L3-4 and L4-5 laminectomy with Dr. Pryor.    24 hours events: No events overnight.  Exam remained stable.  No significant improvement in numbness and tingling in bilateral lower extremities.  Patient with incisional pain ambulating in room today.     On exam: Patient is alert and oriented.  Patient endorses left lower extremity radicular symptoms more severe than right.  Pain radiates from patient's back into his left and right glued going down his left posterior thigh into his lower extremity.  On the right side it goes into the posterior lateral thigh stopping before the knee.  Patient's strength appears 5 out of 5 to all fields in bilateral lower extremities.  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  Incision: Clean dry and intact.  Surgical drain with 90 mL of output yesterday.     PLAN:  -PT/OT-recommending home with assist  -Monitor and record drain output even if 0  -If drain output less than 40 mL per shift drain can be removed  -Up with assist  -Pain control  -Possible discharge as soon as January 8     Pieter Giron [facility] Neurosurgery  [facility]  Vocera messaging strongly preferred  Please always look up on-call provider before messaging/paging            Dragon Disclosure   This was created at least in part with a voice recognition software. Mistakes/typos may be present.

## 2025-01-07 NOTE — PLAN OF CARE
" Goal Outcome Evaluation:      Plan of Care Reviewed With: patient    Overall Patient Progress: no changeOverall Patient Progress: no change    Outcome Evaluation: A&O x4, on 3 LPM NC, capno on, pain mngd w/ dilaudid, neurosurgery paged dt uncontrolled back spasm pain- orders obtained for prn valium, scant amount of drainage on dressing, CMS intact, regular diet, vigil WDL, hemovac 75 mL out- paged neurosurgery for updated orders    Problem: Adult Inpatient Plan of Care  Goal: Plan of Care Review  Description: The Plan of Care Review/Shift note should be completed every shift.  The Outcome Evaluation is a brief statement about your assessment that the patient is improving, declining, or no change.  This information will be displayed automatically on your shift  note.  Outcome: Progressing  Flowsheets (Taken 1/6/2025 2008)  Outcome Evaluation: A&O x4, on 3 LPM NC, capno on, pain mngd w/ dilaudid, neurosurgery paged dt uncontrolled back spasm pain- orders obtained for prn valium, scant amount of drainage on dressing, CMS intact, regular diet, vigil WDL, hemovac 75 mL out- paged neurosurgery for updated orders  Plan of Care Reviewed With: patient  Overall Patient Progress: no change  Goal: Patient-Specific Goal (Individualized)  Description: You can add care plan individualizations to a care plan. Examples of Individualization might be:  \"Parent requests to be called daily at 9am for status\", \"I have a hard time hearing out of my right ear\", or \"Do not touch me to wake me up as it startles  me\".  Outcome: Progressing  Goal: Absence of Hospital-Acquired Illness or Injury  Outcome: Progressing  Intervention: Identify and Manage Fall Risk  Recent Flowsheet Documentation  Taken 1/6/2025 1808 by Yamilet Stone, RN  Safety Promotion/Fall Prevention: activity supervised  Intervention: Prevent Skin Injury  Recent Flowsheet Documentation  Taken 1/6/2025 1808 by Yamilet Stone, RN  Body Position: side-lying 30 " degrees  Intervention: Prevent and Manage VTE (Venous Thromboembolism) Risk  Recent Flowsheet Documentation  Taken 1/6/2025 1808 by Yamilet Stone RN  VTE Prevention/Management: SCDs on (sequential compression devices)  Intervention: Prevent Infection  Recent Flowsheet Documentation  Taken 1/6/2025 1808 by Yamilet Stone RN  Infection Prevention: hand hygiene promoted  Goal: Optimal Comfort and Wellbeing  Outcome: Progressing  Intervention: Monitor Pain and Promote Comfort  Recent Flowsheet Documentation  Taken 1/6/2025 1901 by Yamilet Stone RN  Pain Management Interventions: MD notified (comment)  Taken 1/6/2025 1849 by Yamilet Stone RN  Pain Management Interventions:   cold applied   MD notified (comment)   medication (see MAR)  Taken 1/6/2025 1805 by Yamilet Stone RN  Pain Management Interventions: cold applied  Goal: Readiness for Transition of Care  Outcome: Progressing     Problem: Spinal Surgery  Goal: Optimal Coping with Surgery  Outcome: Progressing  Goal: Absence of Bleeding  Outcome: Progressing  Goal: Effective Bowel Elimination  Outcome: Progressing  Goal: Fluid and Electrolyte Balance  Outcome: Progressing  Goal: Optimal Functional Ability  Outcome: Progressing  Intervention: Optimize Functional Status  Recent Flowsheet Documentation  Taken 1/6/2025 1808 by Yamilet Stone RN  Activity Management: activity adjusted per tolerance  Positioning/Transfer Devices:   pillows   in use  Goal: Absence of Infection Signs and Symptoms  Outcome: Progressing  Intervention: Prevent or Manage Infection  Recent Flowsheet Documentation  Taken 1/6/2025 1808 by Yamilet Stone RN  Infection Prevention: hand hygiene promoted  Goal: Optimal Neurologic Function  Outcome: Progressing  Intervention: Optimize Neurologic Function  Recent Flowsheet Documentation  Taken 1/6/2025 1808 by Yamilet Stone RN  Body Position: side-lying 30 degrees  Goal: Anesthesia/Sedation Recovery  Outcome:  Progressing  Intervention: Optimize Anesthesia Recovery  Recent Flowsheet Documentation  Taken 1/6/2025 1808 by Yamilet Stone, RN  Safety Promotion/Fall Prevention: activity supervised  Goal: Optimal Pain Control and Function  Outcome: Progressing  Intervention: Prevent or Manage Pain  Recent Flowsheet Documentation  Taken 1/6/2025 1901 by Yamilet Stone, RN  Pain Management Interventions: MD notified (comment)  Taken 1/6/2025 1849 by Yamilet Stone RN  Pain Management Interventions:   cold applied   MD notified (comment)   medication (see MAR)  Taken 1/6/2025 1805 by Yamilet Stone RN  Pain Management Interventions: cold applied  Goal: Nausea and Vomiting Relief  Outcome: Progressing  Goal: Effective Urinary Elimination  Outcome: Progressing  Goal: Effective Oxygenation and Ventilation  Outcome: Progressing  Intervention: Optimize Oxygenation and Ventilation  Recent Flowsheet Documentation  Taken 1/6/2025 1808 by Yamilet Stone, RN  Head of Bed (HOB) Positioning: HOB flat

## 2025-01-07 NOTE — PLAN OF CARE
"Goal Outcome Evaluation:      Plan of Care Reviewed With: patient    Overall Patient Progress: improvingOverall Patient Progress: improving    Outcome Evaluation: Pt is A&Ox4. On 3L NC w/capno on. LS clear. Assist x2 W/G. PIV L hand SL. Palacios removed. Lower back dressign CDI. R foot amputation. CMS intact. Hemovac 60cc. Pain controlled with scheduled tylenol and PRN dilaudid, PRN vallium given, PRN robaxin given. Pt complains of muscle spasms. DC plans TBD.      Problem: Adult Inpatient Plan of Care  Goal: Plan of Care Review  Description: The Plan of Care Review/Shift note should be completed every shift.  The Outcome Evaluation is a brief statement about your assessment that the patient is improving, declining, or no change.  This information will be displayed automatically on your shift  note.  Outcome: Progressing  Flowsheets (Taken 1/7/2025 0129)  Outcome Evaluation: Pt is A&Ox4. On 3L NC w/capno on. LS clear. Assist x2 W/G. PIV L hand SL. Palacios WLD. Lowe back dressign CDI. R foot amputation. CMS intact. Hemovac. Pain controlled with scheduled tylenon and PRN dilaudid, PRN vallium given, PRN robaxin given. DC plans TBD.  Plan of Care Reviewed With: patient  Overall Patient Progress: improving  Goal: Patient-Specific Goal (Individualized)  Description: You can add care plan individualizations to a care plan. Examples of Individualization might be:  \"Parent requests to be called daily at 9am for status\", \"I have a hard time hearing out of my right ear\", or \"Do not touch me to wake me up as it startles  me\".  Outcome: Progressing  Goal: Absence of Hospital-Acquired Illness or Injury  Outcome: Progressing  Intervention: Identify and Manage Fall Risk  Recent Flowsheet Documentation  Taken 1/6/2025 2030 by Alex Garcia, RN  Safety Promotion/Fall Prevention: activity supervised  Intervention: Prevent Skin Injury  Recent Flowsheet Documentation  Taken 1/6/2025 2030 by Alex Garcia, RN  Body Position: " side-lying 30 degrees  Intervention: Prevent and Manage VTE (Venous Thromboembolism) Risk  Recent Flowsheet Documentation  Taken 1/6/2025 2030 by Alex Garcia RN  VTE Prevention/Management: SCDs on (sequential compression devices)  Intervention: Prevent Infection  Recent Flowsheet Documentation  Taken 1/6/2025 2030 by Alex Garcia RN  Infection Prevention:   rest/sleep promoted   hand hygiene promoted  Goal: Optimal Comfort and Wellbeing  Outcome: Progressing  Intervention: Monitor Pain and Promote Comfort  Recent Flowsheet Documentation  Taken 1/6/2025 2023 by Alex Garcia RN  Pain Management Interventions: medication (see MAR)  Goal: Readiness for Transition of Care  Outcome: Progressing     Problem: Spinal Surgery  Goal: Optimal Coping with Surgery  Outcome: Progressing  Goal: Absence of Bleeding  Outcome: Progressing  Intervention: Monitor and Manage Bleeding  Recent Flowsheet Documentation  Taken 1/6/2025 2030 by Alex Garcia RN  Bleeding Management: dressing monitored  Goal: Effective Bowel Elimination  Outcome: Progressing  Goal: Fluid and Electrolyte Balance  Outcome: Progressing  Goal: Optimal Functional Ability  Outcome: Progressing  Intervention: Optimize Functional Status  Recent Flowsheet Documentation  Taken 1/6/2025 2030 by Alex Garcia RN  Activity Management:   activity adjusted per tolerance   activity encouraged  Positioning/Transfer Devices:   pillows   in use  Goal: Absence of Infection Signs and Symptoms  Outcome: Progressing  Intervention: Prevent or Manage Infection  Recent Flowsheet Documentation  Taken 1/6/2025 2030 by Alex Garcia RN  Infection Prevention:   rest/sleep promoted   hand hygiene promoted  Goal: Optimal Neurologic Function  Outcome: Progressing  Intervention: Optimize Neurologic Function  Recent Flowsheet Documentation  Taken 1/6/2025 2030 by Alex Garcia RN  Body Position: side-lying 30 degrees  Goal: Anesthesia/Sedation  Recovery  Outcome: Progressing  Intervention: Optimize Anesthesia Recovery  Recent Flowsheet Documentation  Taken 1/6/2025 2030 by Alex Garcia, RN  Safety Promotion/Fall Prevention: activity supervised  Administration (IS): instruction provided, initial  Level Incentive Spirometer (mL): 2000  Incentive Spirometer Predicted Level (mL): 1500  Number of Repetitions (IS): 10  Patient Tolerance (IS): good  Goal: Optimal Pain Control and Function  Outcome: Progressing  Intervention: Prevent or Manage Pain  Recent Flowsheet Documentation  Taken 1/6/2025 2023 by Alex Garcia, RN  Pain Management Interventions: medication (see MAR)  Goal: Nausea and Vomiting Relief  Outcome: Progressing  Goal: Effective Urinary Elimination  Outcome: Progressing  Goal: Effective Oxygenation and Ventilation  Outcome: Progressing  Intervention: Optimize Oxygenation and Ventilation  Recent Flowsheet Documentation  Taken 1/6/2025 2030 by Alex aGrcia, RN  Head of Bed (HOB) Positioning: HOB flat

## 2025-01-07 NOTE — PLAN OF CARE
"Goal Outcome Evaluation:      Plan of Care Reviewed With: patient    Overall Patient Progress: improvingOverall Patient Progress: improving    Outcome Evaluation: Patient alert and oriented. VSS on room air. Up out of bed assist of 1-2 walker gait belt. High pain levels, every PRN being given. Consider pain consult. Voiding with minimal PVRs. Dressing reinforced. Plans pending.        Problem: Adult Inpatient Plan of Care  Goal: Plan of Care Review  Description: The Plan of Care Review/Shift note should be completed every shift.  The Outcome Evaluation is a brief statement about your assessment that the patient is improving, declining, or no change.  This information will be displayed automatically on your shift  note.  Outcome: Progressing  Flowsheets (Taken 1/7/2025 6058)  Outcome Evaluation: Patient alert and oriented.  Plan of Care Reviewed With: patient  Overall Patient Progress: improving  Goal: Patient-Specific Goal (Individualized)  Description: You can add care plan individualizations to a care plan. Examples of Individualization might be:  \"Parent requests to be called daily at 9am for status\", \"I have a hard time hearing out of my right ear\", or \"Do not touch me to wake me up as it startles  me\".  Outcome: Progressing  Goal: Absence of Hospital-Acquired Illness or Injury  Outcome: Progressing  Intervention: Identify and Manage Fall Risk  Recent Flowsheet Documentation  Taken 1/7/2025 0855 by Betzaida Cordero, RN  Safety Promotion/Fall Prevention:   activity supervised   assistive device/personal items within reach   mobility aid in reach   nonskid shoes/slippers when out of bed   patient and family education   safety round/check completed  Intervention: Prevent Skin Injury  Recent Flowsheet Documentation  Taken 1/7/2025 0855 by Betzaida Cordero, RN  Body Position:   turned   left   position changed independently  Skin Protection: adhesive use limited  Intervention: Prevent and Manage VTE (Venous " Thromboembolism) Risk  Recent Flowsheet Documentation  Taken 1/7/2025 0855 by Betzaida Cordero RN  VTE Prevention/Management: SCDs on (sequential compression devices)  Intervention: Prevent Infection  Recent Flowsheet Documentation  Taken 1/7/2025 0855 by Betzaida Cordero RN  Infection Prevention:   rest/sleep promoted   single patient room provided  Goal: Optimal Comfort and Wellbeing  Outcome: Progressing  Intervention: Monitor Pain and Promote Comfort  Recent Flowsheet Documentation  Taken 1/7/2025 0849 by Betzaida Cordero RN  Pain Management Interventions: medication (see MAR)  Goal: Readiness for Transition of Care  Outcome: Progressing     Problem: Spinal Surgery  Goal: Optimal Coping with Surgery  Outcome: Progressing  Goal: Absence of Bleeding  Outcome: Progressing  Intervention: Monitor and Manage Bleeding  Recent Flowsheet Documentation  Taken 1/7/2025 0855 by Betzaida Cordero RN  Bleeding Management: dressing monitored  Goal: Effective Bowel Elimination  Outcome: Progressing  Goal: Fluid and Electrolyte Balance  Outcome: Progressing  Goal: Optimal Functional Ability  Outcome: Progressing  Intervention: Optimize Functional Status  Recent Flowsheet Documentation  Taken 1/7/2025 1215 by Betzaida Cordero RN  Activity Management: back to bed  Taken 1/7/2025 1205 by Betzaida Cordero RN  Activity Management:   ambulated in room   up in chair  Positioning/Transfer Devices:   pillows   in use  Taken 1/7/2025 0855 by Betzaida Cordero RN  Activity Management: (with PT)   sitting, edge of bed   back to bed  Positioning/Transfer Devices:   pillows   in use  Goal: Absence of Infection Signs and Symptoms  Outcome: Progressing  Intervention: Prevent or Manage Infection  Recent Flowsheet Documentation  Taken 1/7/2025 0855 by Betzaida Cordero RN  Infection Prevention:   rest/sleep promoted   single patient room provided  Infection Management: aseptic technique maintained  Goal: Optimal Neurologic Function  Outcome:  Progressing  Intervention: Optimize Neurologic Function  Recent Flowsheet Documentation  Taken 1/7/2025 0855 by Betzaida Cordero, RN  Body Position:   turned   left   position changed independently  Goal: Anesthesia/Sedation Recovery  Outcome: Progressing  Intervention: Optimize Anesthesia Recovery  Recent Flowsheet Documentation  Taken 1/7/2025 0855 by Betzaida Cordero, RN  Safety Promotion/Fall Prevention:   activity supervised   assistive device/personal items within reach   mobility aid in reach   nonskid shoes/slippers when out of bed   patient and family education   safety round/check completed  Goal: Optimal Pain Control and Function  Outcome: Progressing  Intervention: Prevent or Manage Pain  Recent Flowsheet Documentation  Taken 1/7/2025 0849 by Betzaida Cordero, RN  Pain Management Interventions: medication (see MAR)  Goal: Nausea and Vomiting Relief  Outcome: Progressing  Goal: Effective Urinary Elimination  Outcome: Progressing  Goal: Effective Oxygenation and Ventilation  Outcome: Progressing  Intervention: Optimize Oxygenation and Ventilation  Recent Flowsheet Documentation  Taken 1/7/2025 0855 by Betzaida Cordero, RN  Head of Bed (HOB) Positioning: HOB at 15 degrees

## 2025-01-08 ENCOUNTER — APPOINTMENT (OUTPATIENT)
Dept: OCCUPATIONAL THERAPY | Facility: CLINIC | Age: 30
End: 2025-01-08
Attending: STUDENT IN AN ORGANIZED HEALTH CARE EDUCATION/TRAINING PROGRAM
Payer: COMMERCIAL

## 2025-01-08 ENCOUNTER — APPOINTMENT (OUTPATIENT)
Dept: PHYSICAL THERAPY | Facility: CLINIC | Age: 30
DRG: 516 | End: 2025-01-08
Attending: STUDENT IN AN ORGANIZED HEALTH CARE EDUCATION/TRAINING PROGRAM
Payer: COMMERCIAL

## 2025-01-08 VITALS
DIASTOLIC BLOOD PRESSURE: 68 MMHG | OXYGEN SATURATION: 98 % | WEIGHT: 306 LBS | HEART RATE: 89 BPM | TEMPERATURE: 97.9 F | RESPIRATION RATE: 16 BRPM | BODY MASS INDEX: 41.45 KG/M2 | HEIGHT: 72 IN | SYSTOLIC BLOOD PRESSURE: 114 MMHG

## 2025-01-08 PROCEDURE — 97535 SELF CARE MNGMENT TRAINING: CPT | Mod: GO

## 2025-01-08 PROCEDURE — 250N000013 HC RX MED GY IP 250 OP 250 PS 637: Performed by: PHYSICIAN ASSISTANT

## 2025-01-08 PROCEDURE — 250N000013 HC RX MED GY IP 250 OP 250 PS 637

## 2025-01-08 PROCEDURE — 97530 THERAPEUTIC ACTIVITIES: CPT | Mod: GP

## 2025-01-08 RX ORDER — OXYCODONE HYDROCHLORIDE 5 MG/1
5 TABLET ORAL EVERY 6 HOURS PRN
Qty: 40 TABLET | Refills: 0 | Status: SHIPPED | OUTPATIENT
Start: 2025-01-08 | End: 2025-01-13

## 2025-01-08 RX ORDER — METHOCARBAMOL 1000 MG/1
1000 TABLET, FILM COATED ORAL EVERY 6 HOURS PRN
Qty: 40 TABLET | Refills: 0 | Status: SHIPPED | OUTPATIENT
Start: 2025-01-08

## 2025-01-08 RX ADMIN — LOSARTAN POTASSIUM 25 MG: 25 TABLET, FILM COATED ORAL at 07:56

## 2025-01-08 RX ADMIN — OXYCODONE HYDROCHLORIDE 10 MG: 5 TABLET ORAL at 01:49

## 2025-01-08 RX ADMIN — ACETAMINOPHEN 975 MG: 325 TABLET, FILM COATED ORAL at 01:46

## 2025-01-08 RX ADMIN — ACETAMINOPHEN 975 MG: 325 TABLET, FILM COATED ORAL at 10:17

## 2025-01-08 RX ADMIN — METHOCARBAMOL 750 MG: 750 TABLET ORAL at 05:56

## 2025-01-08 RX ADMIN — OXYCODONE HYDROCHLORIDE 10 MG: 5 TABLET ORAL at 10:18

## 2025-01-08 RX ADMIN — DIAZEPAM 5 MG: 5 TABLET ORAL at 09:23

## 2025-01-08 RX ADMIN — OXYCODONE HYDROCHLORIDE 10 MG: 5 TABLET ORAL at 05:52

## 2025-01-08 RX ADMIN — METHOCARBAMOL 750 MG: 750 TABLET ORAL at 12:14

## 2025-01-08 RX ADMIN — DICLOFENAC SODIUM 75 MG: 75 TABLET, DELAYED RELEASE ORAL at 09:02

## 2025-01-08 RX ADMIN — PREGABALIN 50 MG: 50 CAPSULE ORAL at 07:56

## 2025-01-08 ASSESSMENT — ACTIVITIES OF DAILY LIVING (ADL)
ADLS_ACUITY_SCORE: 42
ADLS_ACUITY_SCORE: 43
ADLS_ACUITY_SCORE: 42

## 2025-01-08 NOTE — DISCHARGE SUMMARY
"NEUROSURGERY DISCHARGE SUMMARY    Patient Name:  Mario Calderon  MRN:  [id number]      :  1995      Date of Admission:  2025  Date of Discharge:  2025  Admitting Physician:  Clark Pryor MD  Discharge Physician:    Primary Care Provider:   Cedrick Osorio  Discharge Disposition:   Discharged to home    Admission Diagnoses:  Lumbar radiculopathy    Discharge Diagnoses:    Same    Brief History of Illness:   Patient seen in clinic for new onset low back pain radiating into bilateral lower extremities also with paresthesias and occasional urinary incontinence..    Hospital Course:  Patient underwent L3-4 and L4-5 laminectomy with Dr. Pryor and discharged postop day 2.    Pertinent Investigations:  None    Consultations:    None      Discharge physical examination:   /68 (BP Location: Left arm)   Pulse 89   Temp 97.9  F (36.6  C) (Temporal)   Resp 16   Ht 1.829 m (6' 0.01\")   Wt 138.8 kg (306 lb)   SpO2 98%   BMI 41.49 kg/m    General: Awake and alert, Lying in bed, NAD, cooperative  HEENT: Normocephalic, atraumatic, no epistaxis, no oral lesions, MMM  Resp: CTAB, no increased work of breathing  Cardio: RRR  Abdomen: +BS, Soft, non-distended, non-tender  Extremities: Warm and well perfused, peripheral pulses present, no edema  Skin: Surgical incision clean dry and intact closed with sutures.  Drain to be removed.  Psych: Normal mood and affect    Neuro:  Patient is alert and oriented. Patient endorses left lower extremity radicular symptoms more severe than right. Pain radiates from patient's back into his left and right glued going down his left posterior thigh into his lower extremity. On the right side it goes into the posterior lateral thigh stopping before the knee. Patient's strength appears 5 out of 5 to all fields in bilateral lower extremities. 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    Associated Diagnoses: S/P lumbar laminectomy      oxyCODONE (ROXICODONE) 5 MG tablet Take 1 tablet (5 mg) by mouth every 6 hours as needed for moderate to severe pain.  Qty: 40 tablet, Refills: 0    Associated Diagnoses: S/P lumbar laminectomy           CONTINUE these medications which have CHANGED    Details   methocarbamol 1000 MG TABS Take 1,000 mg by mouth every 6 hours as needed for muscle spasms.  Qty: 40 tablet, Refills: 0    Associated Diagnoses: S/P lumbar laminectomy      senna-docusate (SENOKOT-S/PERICOLACE) 8.6-50 MG tablet Take 1 tablet by mouth 2 times daily as needed for constipation (when taking oxy).  Qty: 60 tablet, Refills: 0    Associated Diagnoses: S/P lumbar laminectomy           CONTINUE these medications which have NOT CHANGED    Details   diclofenac (VOLTAREN) 75 MG EC tablet Take 1 tablet (75 mg) by mouth 2 times daily.  Qty: 60 tablet, Refills: 0    Associated Diagnoses: Lumbar radiculopathy      levalbuterol (XOPENEX HFA) 45 MCG/ACT inhaler Inhale 2 puffs into the lungs every 4 hours as needed for shortness of breath or wheezing.  Qty: 15 g, Refills: 2    Associated Diagnoses: Mild intermittent asthma without complication      losartan (COZAAR) 25 MG tablet Take 1 tablet (25 mg) by mouth daily.  Qty: 90 tablet, Refills: 3    Associated Diagnoses: Benign essential hypertension      pregabalin (LYRICA) 50 MG capsule Take 1 capsule (50 mg) by mouth 3 times daily.  Qty: 90 capsule, Refills: 0    Associated Diagnoses: Lumbar radiculopathy      amitriptyline (ELAVIL) 25 MG tablet Take 1 tablet (25 mg) by mouth at bedtime.  Qty: 90 tablet, Refills: 1    Associated Diagnoses: Psychophysiological insomnia      Incontinence Supply Disposable (SLIMLINE DISPOSABLE BRIEFS XL) MISC Change every 4 hours or as  needed  Qty: 36 each, Refills: 4    Associated Diagnoses: Lumbar radiculopathy             Discharge follow up and instructions:     Discharge Instructions    Review outpatient procedure discharge instructions as directed by Provider.     Discharge Instructions - Change dressing    Wash hands prior to changing dressing daily. If no drainage on dressing, may leave open to air.     No driving or operating machinery while in a cervical collar    Until follow-up appointment.     Follow-up and recommended labs and tests     Your post-operative follow up appointments have been/will be made for two weeks with a nurse for a well being and wound check visit as well as in six weeks with a provider.     Activity    Please limit your lifting to no more that ten pounds and avoid excessive bending, twisting and turning at the lumbar spine. You should also avoid excessive jostling and jarring activities.     Ok to shampoo hair, shower / bathe, but do not scrub or submerge incision under water until evaluated post operatively in clinic.    Ok to walk as tolerated, avoid bedrest.    No contact sports until after follow up visit.    No high impact activities such as; running/jogging, snowmobile or 4 wyman riding or any other recreational vehicles.    Call my office at [phone] for increasing redness, swelling or pus draining from the incision, increased pain or any other questions and concerns.    Go to ER with any seizure activity, mental status change (increasing confusion), difficulty with speech or increasing or acute weakness     Reason for your hospital stay    Status post lumbar decompression with Dr. Pryor.     Diet    Follow this diet upon discharge: Current Diet:Orders Placed This Encounter      Advance Diet as Tolerated: Regular Diet Adult         Pieter Giron DNP  Neurosurgery  [phone]

## 2025-01-08 NOTE — PLAN OF CARE
"Goal Outcome Evaluation:    VSS, on RA. A&Ox4. Ax2 with Gb, walker, & R prosthetic. Ambulated in hallway today with 8/10, which pt states is improvement. Pain management with oxy, scheduled pain meds, & robaxin. Lower back incision CDI. 50 mL out of drain at 1700. Voiding well, CMS intact. Discharge plan pending.       Plan of Care Reviewed With: patient    Overall Patient Progress: improvingOverall Patient Progress: improving       Problem: Adult Inpatient Plan of Care  Goal: Plan of Care Review  Description: The Plan of Care Review/Shift note should be completed every shift.  The Outcome Evaluation is a brief statement about your assessment that the patient is improving, declining, or no change.  This information will be displayed automatically on your shift  note.  Outcome: Progressing  Flowsheets (Taken 1/7/2025 2300)  Plan of Care Reviewed With: patient  Overall Patient Progress: improving  Goal: Patient-Specific Goal (Individualized)  Description: You can add care plan individualizations to a care plan. Examples of Individualization might be:  \"Parent requests to be called daily at 9am for status\", \"I have a hard time hearing out of my right ear\", or \"Do not touch me to wake me up as it startles  me\".  Outcome: Progressing  Goal: Absence of Hospital-Acquired Illness or Injury  Outcome: Progressing  Intervention: Identify and Manage Fall Risk  Recent Flowsheet Documentation  Taken 1/7/2025 1737 by Bibiana Hassan, RN  Safety Promotion/Fall Prevention:   activity supervised   assistive device/personal items within reach   safety round/check completed  Intervention: Prevent Skin Injury  Recent Flowsheet Documentation  Taken 1/7/2025 1737 by Bibiana Hassan, RN  Body Position:   turned   left   position changed independently  Goal: Optimal Comfort and Wellbeing  Outcome: Progressing  Intervention: Monitor Pain and Promote Comfort  Recent Flowsheet Documentation  Taken 1/7/2025 1923 by Bibiana Hassan, RN  Pain " Management Interventions: repositioned  Taken 1/7/2025 1737 by Bibiana Hassan RN  Pain Management Interventions:   medication (see MAR)   cold applied  Goal: Readiness for Transition of Care  Outcome: Progressing     Problem: Spinal Surgery  Goal: Optimal Coping with Surgery  Outcome: Progressing  Goal: Absence of Bleeding  Outcome: Progressing  Intervention: Monitor and Manage Bleeding  Recent Flowsheet Documentation  Taken 1/7/2025 1737 by Bibiana Hassan, RN  Bleeding Management: dressing monitored  Goal: Effective Bowel Elimination  Outcome: Progressing  Goal: Fluid and Electrolyte Balance  Outcome: Progressing  Goal: Optimal Functional Ability  Outcome: Progressing  Intervention: Optimize Functional Status  Recent Flowsheet Documentation  Taken 1/7/2025 2119 by Bibiana Hassan, RN  Activity Management: ambulated outside room  Taken 1/7/2025 1737 by Bibiana Hassan RN  Activity Management: activity adjusted per tolerance  Positioning/Transfer Devices: pillows  Goal: Absence of Infection Signs and Symptoms  Outcome: Progressing  Goal: Optimal Neurologic Function  Outcome: Progressing  Intervention: Optimize Neurologic Function  Recent Flowsheet Documentation  Taken 1/7/2025 1737 by Bibiana Hassan RN  Body Position:   turned   left   position changed independently  Goal: Anesthesia/Sedation Recovery  Outcome: Progressing  Intervention: Optimize Anesthesia Recovery  Recent Flowsheet Documentation  Taken 1/7/2025 1737 by Bibiana Hsasan, RN  Safety Promotion/Fall Prevention:   activity supervised   assistive device/personal items within reach   safety round/check completed  Goal: Optimal Pain Control and Function  Outcome: Progressing  Intervention: Prevent or Manage Pain  Recent Flowsheet Documentation  Taken 1/7/2025 1923 by Bibiana Hassan, RN  Pain Management Interventions: repositioned  Taken 1/7/2025 1737 by Bibiana Hassan, RN  Pain Management Interventions:   medication (see MAR)   cold  applied  Goal: Nausea and Vomiting Relief  Outcome: Progressing  Goal: Effective Urinary Elimination  Outcome: Progressing  Goal: Effective Oxygenation and Ventilation  Outcome: Progressing  Intervention: Optimize Oxygenation and Ventilation  Recent Flowsheet Documentation  Taken 1/7/2025 1737 by Bibiana Hassan, RN  Head of Bed (HOB) Positioning: HOB at 15 degrees

## 2025-01-08 NOTE — PLAN OF CARE
"Goal Outcome Evaluation:    Pt Aox4, Assist of 1, Walker, GB. Voiding well, pain managed with oxycodone, robaxin and tylenol. Hemovac removed, dressing CDI closed with sutures.     Pt discharged home via the brother.  Discharge instructions reviewed and filled medications sent with the patient.  Beelongings sent with the patient.    Problem: Adult Inpatient Plan of Care  Goal: Plan of Care Review  Description: The Plan of Care Review/Shift note should be completed every shift.  The Outcome Evaluation is a brief statement about your assessment that the patient is improving, declining, or no change.  This information will be displayed automatically on your shift  note.  Outcome: Met  Goal: Patient-Specific Goal (Individualized)  Description: You can add care plan individualizations to a care plan. Examples of Individualization might be:  \"Parent requests to be called daily at 9am for status\", \"I have a hard time hearing out of my right ear\", or \"Do not touch me to wake me up as it startles  me\".  Outcome: Met  Goal: Absence of Hospital-Acquired Illness or Injury  Outcome: Met  Intervention: Identify and Manage Fall Risk  Recent Flowsheet Documentation  Taken 1/8/2025 0902 by Cyndi Abdi RN  Safety Promotion/Fall Prevention:   activity supervised   assistive device/personal items within reach   mobility aid in reach   nonskid shoes/slippers when out of bed   patient and family education   safety round/check completed  Intervention: Prevent Skin Injury  Recent Flowsheet Documentation  Taken 1/8/2025 0902 by Cyndi Abdi RN  Body Position:   position changed independently   turned   right   left  Intervention: Prevent and Manage VTE (Venous Thromboembolism) Risk  Recent Flowsheet Documentation  Taken 1/8/2025 0902 by Cyndi Abdi RN  VTE Prevention/Management: SCDs off (sequential compression devices)  Intervention: Prevent Infection  Recent Flowsheet Documentation  Taken 1/8/2025 0902 by Cyndi Abdi " AISHA RN  Infection Prevention:   single patient room provided   hand hygiene promoted  Goal: Optimal Comfort and Wellbeing  Outcome: Met  Intervention: Monitor Pain and Promote Comfort  Recent Flowsheet Documentation  Taken 1/8/2025 0902 by Cyndi Abdi RN  Pain Management Interventions: medication (see MAR)  Goal: Readiness for Transition of Care  Outcome: Met     Problem: Spinal Surgery  Goal: Optimal Coping with Surgery  Outcome: Met  Goal: Absence of Bleeding  Outcome: Met  Goal: Effective Bowel Elimination  Outcome: Met  Goal: Fluid and Electrolyte Balance  Outcome: Met  Goal: Optimal Functional Ability  Outcome: Met  Intervention: Optimize Functional Status  Recent Flowsheet Documentation  Taken 1/8/2025 0902 by Cyndi Abdi, RN  Activity Management: activity adjusted per tolerance  Positioning/Transfer Devices:   pillows   in use  Goal: Absence of Infection Signs and Symptoms  Outcome: Met  Intervention: Prevent or Manage Infection  Recent Flowsheet Documentation  Taken 1/8/2025 0902 by Cyndi Abdi RN  Infection Prevention:   single patient room provided   hand hygiene promoted  Goal: Optimal Neurologic Function  Outcome: Met  Intervention: Optimize Neurologic Function  Recent Flowsheet Documentation  Taken 1/8/2025 0902 by Cyndi Abdi RN  Body Position:   position changed independently   turned   right   left  Goal: Anesthesia/Sedation Recovery  Outcome: Met  Intervention: Optimize Anesthesia Recovery  Recent Flowsheet Documentation  Taken 1/8/2025 0902 by Cyndi Abdi RN  Safety Promotion/Fall Prevention:   activity supervised   assistive device/personal items within reach   mobility aid in reach   nonskid shoes/slippers when out of bed   patient and family education   safety round/check completed  Goal: Optimal Pain Control and Function  Outcome: Met  Intervention: Prevent or Manage Pain  Recent Flowsheet Documentation  Taken 1/8/2025 0902 by Cyndi Abdi, RN  Pain Management  Interventions: medication (see MAR)  Goal: Nausea and Vomiting Relief  Outcome: Met  Goal: Effective Urinary Elimination  Outcome: Met  Goal: Effective Oxygenation and Ventilation  Outcome: Met  Intervention: Optimize Oxygenation and Ventilation  Recent Flowsheet Documentation  Taken 1/8/2025 0902 by Cyndi Abdi, RN  Head of Bed (HOB) Positioning: HOB at 20-30 degrees

## 2025-01-08 NOTE — PLAN OF CARE
Physical Therapy Discharge Summary    Reason for therapy discharge:    All goals and outcomes met, no further needs identified.    Progress towards therapy goal(s). See goals on Care Plan in Epic electronic health record for goal details.  Goals met    Therapy recommendation(s):    Patient mod I with ambulation using FWW. Anticipate would benefit from OP PT when cleared by surgeon to progress IND mobility.

## 2025-01-08 NOTE — PLAN OF CARE
Occupational Therapy Discharge Summary    Reason for therapy discharge:    Discharged to home.    Progress towards therapy goal(s). See goals on Care Plan in [facility] electronic health record for goal details.  Goals partially met.  Barriers to achieving goals:   discharge from facility.    Therapy recommendation(s):    No further therapy is recommended. Pt conitnues to be below baseline in ADLs and functional mobility. At baseline pt independent with most A/IADLs. Anticipate with pain management pt would be able to return home with assist for taxing IADLs.

## 2025-01-08 NOTE — PLAN OF CARE
"Goal Outcome Evaluation:      Plan of Care Reviewed With: patient    Overall Patient Progress: improvingOverall Patient Progress: improving    Outcome Evaluation: Pain partially managed with medications and ice applications. Discharge to home when meets criteria.    Pt alert and oriented. LS clear - IS encouraged, BS present, passing flatus. CMS intact except for baseline numbness/tingling. Pain management with oxycodone, tylenol, and robaxin during this shift. Voiding adequately. Drsg scant drainage. Hemovac to suction with output of 40cc. AM nurse aware. Tolerating diet.      Problem: Adult Inpatient Plan of Care  Goal: Plan of Care Review  Description: The Plan of Care Review/Shift note should be completed every shift.  The Outcome Evaluation is a brief statement about your assessment that the patient is improving, declining, or no change.  This information will be displayed automatically on your shift  note.  Outcome: Progressing  Flowsheets (Taken 1/8/2025 0802)  Outcome Evaluation: Pain partially managed with medications and ice applications. Discharge to home when meets criteria.  Plan of Care Reviewed With: patient  Overall Patient Progress: improving  Goal: Patient-Specific Goal (Individualized)  Description: You can add care plan individualizations to a care plan. Examples of Individualization might be:  \"Parent requests to be called daily at 9am for status\", \"I have a hard time hearing out of my right ear\", or \"Do not touch me to wake me up as it startles  me\".  Outcome: Progressing  Goal: Absence of Hospital-Acquired Illness or Injury  Outcome: Progressing  Intervention: Identify and Manage Fall Risk  Recent Flowsheet Documentation  Taken 1/8/2025 0149 by Sharyn Garrison, RN  Safety Promotion/Fall Prevention:   activity supervised   assistive device/personal items within reach   mobility aid in reach   nonskid shoes/slippers when out of bed   patient and family education   safety round/check " completed  Intervention: Prevent Skin Injury  Recent Flowsheet Documentation  Taken 1/8/2025 0149 by Sharyn Garrison RN  Body Position:   turned   left   position changed independently  Skin Protection: adhesive use limited  Intervention: Prevent and Manage VTE (Venous Thromboembolism) Risk  Recent Flowsheet Documentation  Taken 1/8/2025 0149 by Sharyn Garrison RN  VTE Prevention/Management: SCDs on (sequential compression devices)  Intervention: Prevent Infection  Recent Flowsheet Documentation  Taken 1/8/2025 0149 by Sharyn Garrison RN  Infection Prevention:   rest/sleep promoted   single patient room provided  Goal: Optimal Comfort and Wellbeing  Outcome: Progressing  Goal: Readiness for Transition of Care  Outcome: Progressing     Problem: Spinal Surgery  Goal: Optimal Coping with Surgery  Outcome: Progressing  Goal: Absence of Bleeding  Outcome: Progressing  Intervention: Monitor and Manage Bleeding  Recent Flowsheet Documentation  Taken 1/8/2025 0149 by Sharyn Garrison RN  Bleeding Management: dressing monitored  Goal: Effective Bowel Elimination  Outcome: Progressing  Goal: Fluid and Electrolyte Balance  Outcome: Progressing  Goal: Optimal Functional Ability  Outcome: Progressing  Intervention: Optimize Functional Status  Recent Flowsheet Documentation  Taken 1/8/2025 0149 by Sharyn Garrison RN  Positioning/Transfer Devices:   pillows   in use  Goal: Absence of Infection Signs and Symptoms  Outcome: Progressing  Intervention: Prevent or Manage Infection  Recent Flowsheet Documentation  Taken 1/8/2025 0149 by Sharyn Garrison RN  Infection Prevention:   rest/sleep promoted   single patient room provided  Infection Management: aseptic technique maintained  Goal: Optimal Neurologic Function  Outcome: Progressing  Intervention: Optimize Neurologic Function  Recent Flowsheet Documentation  Taken 1/8/2025 0149 by Sharyn Garrison RN  Body Position:   turned   left   position changed independently  Goal:  Anesthesia/Sedation Recovery  Outcome: Progressing  Intervention: Optimize Anesthesia Recovery  Recent Flowsheet Documentation  Taken 1/8/2025 0149 by Sharyn Garrison, RN  Safety Promotion/Fall Prevention:   activity supervised   assistive device/personal items within reach   mobility aid in reach   nonskid shoes/slippers when out of bed   patient and family education   safety round/check completed  Administration (IS): (ecnouraged CADB exercises) other (see comments)  Goal: Optimal Pain Control and Function  Outcome: Progressing  Goal: Nausea and Vomiting Relief  Outcome: Progressing  Goal: Effective Urinary Elimination  Outcome: Progressing  Goal: Effective Oxygenation and Ventilation  Outcome: Progressing  Intervention: Optimize Oxygenation and Ventilation  Recent Flowsheet Documentation  Taken 1/8/2025 0149 by Sharyn Garrison, RN  Head of Bed (HOB) Positioning: HOB at 15 degrees

## 2025-01-09 ENCOUNTER — MYC MEDICAL ADVICE (OUTPATIENT)
Dept: NEUROSURGERY | Facility: CLINIC | Age: 30
End: 2025-01-09
Payer: COMMERCIAL

## 2025-01-09 DIAGNOSIS — M62.838 MUSCLE SPASM: ICD-10-CM

## 2025-01-09 DIAGNOSIS — Z98.890 STATUS POST LUMBAR SURGERY: Primary | ICD-10-CM

## 2025-01-09 RX ORDER — DIAZEPAM 5 MG/1
5 TABLET ORAL EVERY 8 HOURS PRN
Qty: 15 TABLET | Refills: 0 | Status: SHIPPED | OUTPATIENT
Start: 2025-01-09

## 2025-01-13 ENCOUNTER — MYC REFILL (OUTPATIENT)
Dept: FAMILY MEDICINE | Facility: CLINIC | Age: 30
End: 2025-01-13
Payer: COMMERCIAL

## 2025-01-13 ENCOUNTER — MYC MEDICAL ADVICE (OUTPATIENT)
Dept: NEUROSURGERY | Facility: CLINIC | Age: 30
End: 2025-01-13
Payer: COMMERCIAL

## 2025-01-13 DIAGNOSIS — Z98.890 S/P LUMBAR LAMINECTOMY: ICD-10-CM

## 2025-01-13 RX ORDER — OXYCODONE HYDROCHLORIDE 5 MG/1
5 TABLET ORAL EVERY 6 HOURS PRN
Qty: 40 TABLET | Refills: 0 | Status: SHIPPED | OUTPATIENT
Start: 2025-01-13

## 2025-01-13 RX ORDER — OXYCODONE HYDROCHLORIDE 5 MG/1
5 TABLET ORAL EVERY 6 HOURS PRN
Qty: 40 TABLET | Refills: 0 | Status: CANCELLED | OUTPATIENT
Start: 2025-01-13

## 2025-01-13 NOTE — TELEPHONE ENCOUNTER
Pending Prescriptions:                       Disp   Refills    oxyCODONE (ROXICODONE) 5 MG tablet        40 tab*0            Sig: Take 1 tablet (5 mg) by mouth every 6 hours as           needed for moderate pain.        Requested Pharmacy: [facility] in [place]    Pt's last office visit: DOS: 1/6/25  Next scheduled office visit: 3/6/25 (Anjelica Ramesh)      Per the RN/LPN medication refill protocol, writer is unable to refill this request.

## 2025-01-15 ENCOUNTER — MEDICAL CORRESPONDENCE (OUTPATIENT)
Dept: HEALTH INFORMATION MANAGEMENT | Facility: CLINIC | Age: 30
End: 2025-01-15

## 2025-01-27 ENCOUNTER — TELEPHONE (OUTPATIENT)
Dept: NEUROSURGERY | Facility: CLINIC | Age: 30
End: 2025-01-27
Payer: COMMERCIAL

## 2025-01-27 NOTE — TELEPHONE ENCOUNTER
[facility] Call Center    Phone Message    May a detailed message be left on voicemail: yes     Reason for Call: Other: Patient called to schedule his 2 week nurse only post-op visit. Please call to schedule.      Action Taken: Message routed to:  Other: Neurosurgery    Travel Screening: Not Applicable     Date of Service:

## 2025-01-28 NOTE — TELEPHONE ENCOUNTER
Left Voicemail (1st Attempt) for the patient to call back and schedule the following:    Location:  Neurosurgery  Provider: RN  Appointment type: nurse post op  Appointment mode: in person  Return date: soonest available, DOS 1/6    Specialty phone number: [phone]    Is Imaging Needed: no  Imaging Phone Number to provide to patient: n/a    Additional Notes: please warm transfer to clinic for scheduling

## 2025-01-31 NOTE — TELEPHONE ENCOUNTER
Left Voicemail (2ndAttempt) for the patient to call back and schedule the following:     Location:  Neurosurgery  Provider: RN  Appointment type: nurse post op  Appointment mode: in person  Return date: soonest available, DOS 1/6     Specialty phone number: [phone]     Is Imaging Needed: no  Imaging Phone Number to provide to patient: n/a     Additional Notes: please warm transfer to clinic for scheduling

## 2025-02-03 NOTE — TELEPHONE ENCOUNTER
I called patient to let him know that he does not need to come in for his 2 week Follow-up and see how how his incision is healing. Patient states his incision is healing nicely with no redness or  drainage.

## 2025-02-03 NOTE — TELEPHONE ENCOUNTER
Patient was contacted regarding the scheduling of the 2 week post surgical visit.    Patient states he already took out the stitches, and is currently not in the state- out of town visiting with family and won't be back for about 2 more weeks.

## 2025-03-08 ENCOUNTER — HEALTH MAINTENANCE LETTER (OUTPATIENT)
Age: 30
End: 2025-03-08

## 2025-03-26 ENCOUNTER — TELEPHONE (OUTPATIENT)
Dept: NEUROSURGERY | Facility: CLINIC | Age: 30
End: 2025-03-26
Payer: COMMERCIAL

## 2025-03-26 NOTE — TELEPHONE ENCOUNTER
Attempted to reach patient to remind them about appointment scheduled with Clark Pryor MD on 3/27/25 in our [facility].    A voicemail was left with a call back number if the patient has questions or would like to reschedule.

## 2025-03-27 ENCOUNTER — MYC MEDICAL ADVICE (OUTPATIENT)
Dept: NEUROSURGERY | Facility: CLINIC | Age: 30
End: 2025-03-27

## 2025-03-27 ENCOUNTER — OFFICE VISIT (OUTPATIENT)
Dept: NEUROSURGERY | Facility: CLINIC | Age: 30
End: 2025-03-27
Payer: COMMERCIAL

## 2025-03-27 VITALS
HEART RATE: 73 BPM | DIASTOLIC BLOOD PRESSURE: 87 MMHG | SYSTOLIC BLOOD PRESSURE: 130 MMHG | HEIGHT: 72 IN | BODY MASS INDEX: 42.66 KG/M2 | WEIGHT: 315 LBS

## 2025-03-27 DIAGNOSIS — Z98.890 S/P LUMBAR LAMINECTOMY: ICD-10-CM

## 2025-03-27 DIAGNOSIS — M62.838 MUSCLE SPASM: Primary | ICD-10-CM

## 2025-03-27 DIAGNOSIS — R32 URINARY INCONTINENCE, UNSPECIFIED TYPE: ICD-10-CM

## 2025-03-27 RX ORDER — CYCLOBENZAPRINE HCL 10 MG
10 TABLET ORAL 3 TIMES DAILY PRN
Qty: 60 TABLET | Refills: 1 | Status: SHIPPED | OUTPATIENT
Start: 2025-03-27

## 2025-03-27 NOTE — TELEPHONE ENCOUNTER
Patient stated he was here already and would do appointment in person. No further concerns at this time.

## 2025-03-27 NOTE — LETTER
3/27/2025      Mario Calderon  [address] [place] [address]      Dear Colleague,    Thank you for referring your patient, Mario Calderon, to the [facility] [place]. Please see a copy of my visit note below.    HPI:  29-year-old male status post L3-4 and L4-5 laminectomy for lumbar stenosis with neurogenic claudication.  He also had chronic urinary incontinence.  He has not noticed any significant change in the urinary incontinence since surgery.  This has been going on for a few months prior to surgery.  He had noticed low back pain radiating to the bilateral lower extremities prior to surgery and this has improved since surgery with no significant pain in the lower extremity since surgery.  He does get back pain with increased ambulation still however.  He is hoping to go back to work full-time.  Current Outpatient Medications   Medication Sig Dispense Refill     acetaminophen (TYLENOL) 325 MG tablet Take 2 tablets (650 mg) by mouth every 4 hours as needed for mild pain.       amitriptyline (ELAVIL) 25 MG tablet Take 1 tablet (25 mg) by mouth at bedtime. 90 tablet 1     cyclobenzaprine (FLEXERIL) 10 MG tablet Take 1 tablet (10 mg) by mouth 3 times daily as needed for muscle spasms. 60 tablet 1     diazepam (VALIUM) 5 MG tablet Take 1 tablet (5 mg) by mouth every 8 hours as needed for muscle spasms or pain. 15 tablet 0     Incontinence Supply Disposable (SLIMLINE DISPOSABLE BRIEFS XL) MISC Change every 4 hours or as needed 36 each 4     levalbuterol (XOPENEX HFA) 45 MCG/ACT inhaler Inhale 2 puffs into the lungs every 4 hours as needed for shortness of breath or wheezing. 15 g 2     losartan (COZAAR) 25 MG tablet Take 1 tablet (25 mg) by mouth daily. 90 tablet 3     diclofenac (VOLTAREN) 75 MG EC tablet Take 1 tablet (75 mg) by mouth 2 times daily. (Patient not taking: Reported on 3/27/2025) 60 tablet 0     methocarbamol 1000 MG TABS Take 1,000 mg by mouth every 6  hours as needed for muscle spasms. (Patient not taking: Reported on 3/27/2025) 40 tablet 0     oxyCODONE (ROXICODONE) 5 MG tablet Take 1 tablet (5 mg) by mouth every 6 hours as needed for moderate pain. (Patient not taking: Reported on 3/27/2025) 40 tablet 0     pregabalin (LYRICA) 50 MG capsule Take 1 capsule (50 mg) by mouth 3 times daily. (Patient not taking: Reported on 3/27/2025) 90 capsule 0     senna-docusate (SENOKOT-S/PERICOLACE) 8.6-50 MG tablet Take 1 tablet by mouth 2 times daily as needed for constipation (when taking oxy). (Patient not taking: Reported on 3/27/2025) 60 tablet 0     No current facility-administered medications for this visit.      Physical Exam:  Vital signs:      BP: 130/87 Pulse: 73           Height: 182.9 cm (6') Weight: (!) 144.4 kg (318 lb 6.4 oz)  Estimated body mass index is 43.18 kg/m  as calculated from the following:    Height as of this encounter: 1.829 m (6').    Weight as of this encounter: 144.4 kg (318 lb 6.4 oz).  He has full strength in his left lower extremity.  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  Sensation is slightly diminished over the lower left leg.  Results Reviewed:  No new imaging to review today.  Assessment:  29-year-old male status post L3-4 and L4-5 laminectomy for lumbar stenosis with neurogenic claudication and urinary incontinence.  At this time given the neurogenic incontinence was probably present for quite a while prior to surgery and not sure if it will improve.  He does continue to have low back pain which is likely due to multilevel spondylosis but due to his significant disc herniations with calcification this will be a very difficult problem to solve long-term.  Plan:  At this time I will have him referred to urology to see if there is anything they can do to improve his urinary incontinence or help manage this better.  I have sent a muscle relaxer for him to help with muscle spasms and potentially sleeping.  I will have him  talk to his primary care doctor about any other medications for sleep going forward.  I have also made a referral to work conditioning to see if they can give him better restrictions however I have given him a note today stating he can go back to work without any formal restrictions right now.  We discussed that he may be a candidate for surgery in the future for his low back pain however with the multiple calcifications of the low back this does make treatment very difficult.  He would likely require a large multilevel lumbar fusion to address all of the problems going on and given his current weight and his age he is not a good candidate for this type of procedure.  I will have him follow-up with me as needed going forward.    Clark Pryor MD    HPI:  Current Outpatient Medications   Medication Sig Dispense Refill     acetaminophen (TYLENOL) 325 MG tablet Take 2 tablets (650 mg) by mouth every 4 hours as needed for mild pain.       amitriptyline (ELAVIL) 25 MG tablet Take 1 tablet (25 mg) by mouth at bedtime. 90 tablet 1     cyclobenzaprine (FLEXERIL) 10 MG tablet Take 1 tablet (10 mg) by mouth 3 times daily as needed for muscle spasms. 60 tablet 1     diazepam (VALIUM) 5 MG tablet Take 1 tablet (5 mg) by mouth every 8 hours as needed for muscle spasms or pain. 15 tablet 0     Incontinence Supply Disposable (SLIMLINE DISPOSABLE BRIEFS XL) MISC Change every 4 hours or as needed 36 each 4     levalbuterol (XOPENEX HFA) 45 MCG/ACT inhaler Inhale 2 puffs into the lungs every 4 hours as needed for shortness of breath or wheezing. 15 g 2     losartan (COZAAR) 25 MG tablet Take 1 tablet (25 mg) by mouth daily. 90 tablet 3     diclofenac (VOLTAREN) 75 MG EC tablet Take 1 tablet (75 mg) by mouth 2 times daily. (Patient not taking: Reported on 3/27/2025) 60 tablet 0     methocarbamol 1000 MG TABS Take 1,000 mg by mouth every 6 hours as needed for muscle spasms. (Patient not taking: Reported on 3/27/2025) 40 tablet 0      oxyCODONE (ROXICODONE) 5 MG tablet Take 1 tablet (5 mg) by mouth every 6 hours as needed for moderate pain. (Patient not taking: Reported on 3/27/2025) 40 tablet 0     pregabalin (LYRICA) 50 MG capsule Take 1 capsule (50 mg) by mouth 3 times daily. (Patient not taking: Reported on 3/27/2025) 90 capsule 0     senna-docusate (SENOKOT-S/PERICOLACE) 8.6-50 MG tablet Take 1 tablet by mouth 2 times daily as needed for constipation (when taking oxy). (Patient not taking: Reported on 3/27/2025) 60 tablet 0     No current facility-administered medications for this visit.      Physical Exam:  Vital signs:      BP: 130/87 Pulse: 73           Height: 182.9 cm (6') Weight: (!) 144.4 kg (318 lb 6.4 oz)  Estimated body mass index is 43.18 kg/m  as calculated from the following:    Height as of this encounter: 1.829 m (6').    Weight as of this encounter: 144.4 kg (318 lb 6.4 oz).  Results Reviewed:  Assessment:  Plan:      Again, thank you for allowing me to participate in the care of your patient.        Sincerely,        Clark Pryor MD    Electronically signed

## 2025-03-27 NOTE — PROGRESS NOTES
HPI:  29-year-old male status post L3-4 and L4-5 laminectomy for lumbar stenosis with neurogenic claudication.  He also had chronic urinary incontinence.  He has not noticed any significant change in the urinary incontinence since surgery.  This has been going on for a few months prior to surgery.  He had noticed low back pain radiating to the bilateral lower extremities prior to surgery and this has improved since surgery with no significant pain in the lower extremity since surgery.  He does get back pain with increased ambulation still however.  He is hoping to go back to work full-time.  Current Outpatient Medications   Medication Sig Dispense Refill    acetaminophen (TYLENOL) 325 MG tablet Take 2 tablets (650 mg) by mouth every 4 hours as needed for mild pain.      amitriptyline (ELAVIL) 25 MG tablet Take 1 tablet (25 mg) by mouth at bedtime. 90 tablet 1    cyclobenzaprine (FLEXERIL) 10 MG tablet Take 1 tablet (10 mg) by mouth 3 times daily as needed for muscle spasms. 60 tablet 1    diazepam (VALIUM) 5 MG tablet Take 1 tablet (5 mg) by mouth every 8 hours as needed for muscle spasms or pain. 15 tablet 0    Incontinence Supply Disposable (SLIMLINE DISPOSABLE BRIEFS XL) MISC Change every 4 hours or as needed 36 each 4    levalbuterol (XOPENEX HFA) 45 MCG/ACT inhaler Inhale 2 puffs into the lungs every 4 hours as needed for shortness of breath or wheezing. 15 g 2    losartan (COZAAR) 25 MG tablet Take 1 tablet (25 mg) by mouth daily. 90 tablet 3    diclofenac (VOLTAREN) 75 MG EC tablet Take 1 tablet (75 mg) by mouth 2 times daily. (Patient not taking: Reported on 3/27/2025) 60 tablet 0    methocarbamol 1000 MG TABS Take 1,000 mg by mouth every 6 hours as needed for muscle spasms. (Patient not taking: Reported on 3/27/2025) 40 tablet 0    oxyCODONE (ROXICODONE) 5 MG tablet Take 1 tablet (5 mg) by mouth every 6 hours as needed for moderate pain. (Patient not taking: Reported on 3/27/2025) 40 tablet 0    pregabalin  (LYRICA) 50 MG capsule Take 1 capsule (50 mg) by mouth 3 times daily. (Patient not taking: Reported on 3/27/2025) 90 capsule 0    senna-docusate (SENOKOT-S/PERICOLACE) 8.6-50 MG tablet Take 1 tablet by mouth 2 times daily as needed for constipation (when taking oxy). (Patient not taking: Reported on 3/27/2025) 60 tablet 0     No current facility-administered medications for this visit.      Physical Exam:  Vital signs:      BP: 130/87 Pulse: 73           Height: 182.9 cm (6') Weight: (!) 144.4 kg (318 lb 6.4 oz)  Estimated body mass index is 43.18 kg/m  as calculated from the following:    Height as of this encounter: 1.829 m (6').    Weight as of this encounter: 144.4 kg (318 lb 6.4 oz).  He has full strength in his left lower extremity.  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  Sensation is slightly diminished over the lower left leg.  Results Reviewed:  No new imaging to review today.  Assessment:  29-year-old male status post L3-4 and L4-5 laminectomy for lumbar stenosis with neurogenic claudication and urinary incontinence.  At this time given the neurogenic incontinence was probably present for quite a while prior to surgery and not sure if it will improve.  He does continue to have low back pain which is likely due to multilevel spondylosis but due to his significant disc herniations with calcification this will be a very difficult problem to solve long-term.  Plan:  At this time I will have him referred to urology to see if there is anything they can do to improve his urinary incontinence or help manage this better.  I have sent a muscle relaxer for him to help with muscle spasms and potentially sleeping.  I will have him talk to his primary care doctor about any other medications for sleep going forward.  I have also made a referral to work conditioning to see if they can give him better restrictions however I have given him a note today stating he can go back to work without any formal  restrictions right now.  We discussed that he may be a candidate for surgery in the future for his low back pain however with the multiple calcifications of the low back this does make treatment very difficult.  He would likely require a large multilevel lumbar fusion to address all of the problems going on and given his current weight and his age he is not a good candidate for this type of procedure.  I will have him follow-up with me as needed going forward.    Clark Pryor MD

## 2025-03-27 NOTE — LETTER
March 27, 2025      Mario Calderon  [address]  [place] [facility] [address]        To Whom It May Concern:    Mario Calderon was seen in our clinic by Dr Clark Pryor. He may return to work as tolerated.    Any questions or concerns, please call [phone].        Sincerely,        Clark Pryor MD    Electronically signed

## 2025-03-27 NOTE — PROGRESS NOTES
HPI:  Current Outpatient Medications   Medication Sig Dispense Refill    acetaminophen (TYLENOL) 325 MG tablet Take 2 tablets (650 mg) by mouth every 4 hours as needed for mild pain.      amitriptyline (ELAVIL) 25 MG tablet Take 1 tablet (25 mg) by mouth at bedtime. 90 tablet 1    cyclobenzaprine (FLEXERIL) 10 MG tablet Take 1 tablet (10 mg) by mouth 3 times daily as needed for muscle spasms. 60 tablet 1    diazepam (VALIUM) 5 MG tablet Take 1 tablet (5 mg) by mouth every 8 hours as needed for muscle spasms or pain. 15 tablet 0    Incontinence Supply Disposable (SLIMLINE DISPOSABLE BRIEFS XL) MISC Change every 4 hours or as needed 36 each 4    levalbuterol (XOPENEX HFA) 45 MCG/ACT inhaler Inhale 2 puffs into the lungs every 4 hours as needed for shortness of breath or wheezing. 15 g 2    losartan (COZAAR) 25 MG tablet Take 1 tablet (25 mg) by mouth daily. 90 tablet 3    diclofenac (VOLTAREN) 75 MG EC tablet Take 1 tablet (75 mg) by mouth 2 times daily. (Patient not taking: Reported on 3/27/2025) 60 tablet 0    methocarbamol 1000 MG TABS Take 1,000 mg by mouth every 6 hours as needed for muscle spasms. (Patient not taking: Reported on 3/27/2025) 40 tablet 0    oxyCODONE (ROXICODONE) 5 MG tablet Take 1 tablet (5 mg) by mouth every 6 hours as needed for moderate pain. (Patient not taking: Reported on 3/27/2025) 40 tablet 0    pregabalin (LYRICA) 50 MG capsule Take 1 capsule (50 mg) by mouth 3 times daily. (Patient not taking: Reported on 3/27/2025) 90 capsule 0    senna-docusate (SENOKOT-S/PERICOLACE) 8.6-50 MG tablet Take 1 tablet by mouth 2 times daily as needed for constipation (when taking oxy). (Patient not taking: Reported on 3/27/2025) 60 tablet 0     No current facility-administered medications for this visit.      Physical Exam:  Vital signs:      BP: 130/87 Pulse: 73           Height: 182.9 cm (6') Weight: (!) 144.4 kg (318 lb 6.4 oz)  Estimated body mass index is 43.18 kg/m  as calculated from the  following:    Height as of this encounter: 1.829 m (6').    Weight as of this encounter: 144.4 kg (318 lb 6.4 oz).  Results Reviewed:  Assessment:  Plan:

## 2025-04-14 SDOH — HEALTH STABILITY: PHYSICAL HEALTH: ON AVERAGE, HOW MANY DAYS PER WEEK DO YOU ENGAGE IN MODERATE TO STRENUOUS EXERCISE (LIKE A BRISK WALK)?: 5 DAYS

## 2025-04-14 SDOH — HEALTH STABILITY: PHYSICAL HEALTH: ON AVERAGE, HOW MANY MINUTES DO YOU ENGAGE IN EXERCISE AT THIS LEVEL?: PATIENT DECLINED

## 2025-04-14 ASSESSMENT — ASTHMA QUESTIONNAIRES
QUESTION_3 LAST FOUR WEEKS HOW OFTEN DID YOUR ASTHMA SYMPTOMS (WHEEZING, COUGHING, SHORTNESS OF BREATH, CHEST TIGHTNESS OR PAIN) WAKE YOU UP AT NIGHT OR EARLIER THAN USUAL IN THE MORNING: NOT AT ALL
QUESTION_1 LAST FOUR WEEKS HOW MUCH OF THE TIME DID YOUR ASTHMA KEEP YOU FROM GETTING AS MUCH DONE AT WORK, SCHOOL OR AT HOME: SOME OF THE TIME
QUESTION_5 LAST FOUR WEEKS HOW WOULD YOU RATE YOUR ASTHMA CONTROL: WELL CONTROLLED
QUESTION_4 LAST FOUR WEEKS HOW OFTEN HAVE YOU USED YOUR RESCUE INHALER OR NEBULIZER MEDICATION (SUCH AS ALBUTEROL): ONCE A WEEK OR LESS
ACT_TOTALSCORE: 20
QUESTION_2 LAST FOUR WEEKS HOW OFTEN HAVE YOU HAD SHORTNESS OF BREATH: ONCE OR TWICE A WEEK

## 2025-04-14 ASSESSMENT — SOCIAL DETERMINANTS OF HEALTH (SDOH): HOW OFTEN DO YOU GET TOGETHER WITH FRIENDS OR RELATIVES?: NEVER

## 2025-04-16 ENCOUNTER — MEDICAL CORRESPONDENCE (OUTPATIENT)
Dept: HEALTH INFORMATION MANAGEMENT | Facility: CLINIC | Age: 30
End: 2025-04-16

## 2025-04-16 ENCOUNTER — OFFICE VISIT (OUTPATIENT)
Dept: FAMILY MEDICINE | Facility: CLINIC | Age: 30
End: 2025-04-16
Payer: COMMERCIAL

## 2025-04-16 VITALS
SYSTOLIC BLOOD PRESSURE: 131 MMHG | BODY MASS INDEX: 44.1 KG/M2 | WEIGHT: 315 LBS | DIASTOLIC BLOOD PRESSURE: 98 MMHG | HEIGHT: 71 IN | RESPIRATION RATE: 20 BRPM | TEMPERATURE: 98.2 F | HEART RATE: 96 BPM | OXYGEN SATURATION: 98 %

## 2025-04-16 DIAGNOSIS — Z00.00 ROUTINE HISTORY AND PHYSICAL EXAMINATION OF ADULT: Primary | ICD-10-CM

## 2025-04-16 DIAGNOSIS — I10 BENIGN ESSENTIAL HYPERTENSION: ICD-10-CM

## 2025-04-16 DIAGNOSIS — E11.9 TYPE 2 DIABETES MELLITUS WITHOUT COMPLICATION, WITHOUT LONG-TERM CURRENT USE OF INSULIN (H): ICD-10-CM

## 2025-04-16 DIAGNOSIS — F51.01 PRIMARY INSOMNIA: ICD-10-CM

## 2025-04-16 DIAGNOSIS — Z13.6 SCREENING FOR CARDIOVASCULAR CONDITION: ICD-10-CM

## 2025-04-16 DIAGNOSIS — Z89.439 S/P SYME AMPUTATION OF FOOT (H): ICD-10-CM

## 2025-04-16 DIAGNOSIS — Z98.890 S/P LUMBAR LAMINECTOMY: ICD-10-CM

## 2025-04-16 LAB
EST. AVERAGE GLUCOSE BLD GHB EST-MCNC: 151 MG/DL
HBA1C MFR BLD: 6.9 % (ref 0–5.6)

## 2025-04-16 PROCEDURE — 3080F DIAST BP >= 90 MM HG: CPT | Performed by: PHYSICIAN ASSISTANT

## 2025-04-16 PROCEDURE — 36415 COLL VENOUS BLD VENIPUNCTURE: CPT | Performed by: PHYSICIAN ASSISTANT

## 2025-04-16 PROCEDURE — 3075F SYST BP GE 130 - 139MM HG: CPT | Performed by: PHYSICIAN ASSISTANT

## 2025-04-16 PROCEDURE — 83036 HEMOGLOBIN GLYCOSYLATED A1C: CPT | Performed by: PHYSICIAN ASSISTANT

## 2025-04-16 PROCEDURE — 1125F AMNT PAIN NOTED PAIN PRSNT: CPT | Performed by: PHYSICIAN ASSISTANT

## 2025-04-16 PROCEDURE — 99395 PREV VISIT EST AGE 18-39: CPT | Performed by: PHYSICIAN ASSISTANT

## 2025-04-16 RX ORDER — TRAZODONE HYDROCHLORIDE 50 MG/1
50-100 TABLET ORAL AT BEDTIME
Qty: 60 TABLET | Refills: 1 | Status: SHIPPED | OUTPATIENT
Start: 2025-04-16

## 2025-04-16 RX ORDER — LOSARTAN POTASSIUM 100 MG/1
100 TABLET ORAL DAILY
Qty: 60 TABLET | Refills: 0 | Status: SHIPPED | OUTPATIENT
Start: 2025-04-16

## 2025-04-16 ASSESSMENT — PAIN SCALES - GENERAL: PAINLEVEL_OUTOF10: MODERATE PAIN (5)

## 2025-04-16 NOTE — PROGRESS NOTES
Preventive Care Visit  [facility] BRITTANY Osorio PA-C, Family Medicine  Apr 16, 2025        Whit Martin is a 29 year old, presenting for the following:  Physical        4/16/2025     1:23 PM   Additional Questions   Roomed by Barb Kim CMA   Accompanied by None         4/16/2025     1:23 PM   Patient Reported Additional Medications   Patient reports taking the following new medications None          HPI  Recheck of dm and htn.  No chest pain/sob/palpitations/dizziness/ha's  Doing well after his spine surgery.  Blood pressure elevated. Not taking his losartan.   S/p right foot amputation. Some phantom pain. Meds have never helped.  Ambulates with a prosthesis.     Advance Care Planning    Discussed advance care planning with patient; however, patient declined at this time.        4/14/2025   General Health   How would you rate your overall physical health? (!) FAIR   Feel stress (tense, anxious, or unable to sleep) Very much   (!) STRESS CONCERN      4/14/2025   Nutrition   Three or more servings of calcium each day? Yes   Diet: Regular (no restrictions)   How many servings of fruit and vegetables per day? (!) 0-1   How many sweetened beverages each day? 0-1         4/14/2025   Exercise   Days per week of moderate/strenous exercise 5 days   Average minutes spent exercising at this level Patient declined         4/14/2025   Social Factors   Frequency of gathering with friends or relatives Never   Worry food won't last until get money to buy more Yes   Food not last or not have enough money for food? Yes   Do you have housing? (Housing is defined as stable permanent housing and does not include staying ouside in a car, in a tent, in an abandoned building, in an overnight shelter, or couch-surfing.) Yes   Are you worried about losing your housing? No   Lack of transportation? Yes   Unable to get utilities (heat,electricity)? No   (!) FOOD SECURITY CONCERN PRESENT (!) TRANSPORTATION  CONCERN PRESENT(!) SOCIAL CONNECTIONS CONCERN      4/14/2025   Dental   Dentist two times every year? (!) NO         Today's PHQ-2 Score:       4/16/2025     1:22 PM   PHQ-2 ( 1999 [employer])   Q1: Little interest or pleasure in doing things 1    Q2: Feeling down, depressed or hopeless 1    PHQ-2 Score 2    Q1: Little interest or pleasure in doing things Several days   Q2: Feeling down, depressed or hopeless Several days   PHQ-2 Score 2       Proxy-reported           4/14/2025   Substance Use   If I could quit smoking, I would Completely agree   I want to quit somking, worry about health affects Completely agree   Willing to make a plan to quit smoking Neutral   Willing to cut down before quitting Completely agree   Alcohol more than 3/day or more than 7/wk No   Do you use any other substances recreationally? No     Social History     Tobacco Use    Smoking status: Every Day     Current packs/day: 0.25     Average packs/day: 0.3 packs/day for 20.3 years (5.1 ttl pk-yrs)     Types: Cigarettes     Start date: 1/1/2005     Passive exposure: Current    Smokeless tobacco: Never   Vaping Use    Vaping status: Every Day    Substances: Nicotine    Devices: Disposable    Passive vaping exposure: Yes   Substance Use Topics    Alcohol use: No    Drug use: No           4/14/2025   STI Screening   New sexual partner(s) since last STI/HIV test? No         4/14/2025   Contraception/Family Planning   Questions about contraception or family planning No        Reviewed and updated as needed this visit by Provider                    Past Medical History:   Diagnosis Date    ADD (attention deficit disorder)     Allergic rhinitis due to animal dander     Allergy to mold spores     8/28/12 skin tests pos. for:  dog(+)/DM/M/T/G/W    Asthma, exercise induced     excersise induced    Depressive disorder 2015    Diagnostic skin and sensitization tests 8/28/12 skin tests pos. for:  dog(+)/DM/M/T/G/W    Essential hypertension with goal blood  pressure less than 140/90 09/01/2017    House dust mite allergy     Obese     Seasonal allergic conjunctivitis     Seasonal allergic rhinitis      Past Surgical History:   Procedure Laterality Date    AMPUTATION  2015    APPENDECTOMY  2013    LAMINECTOMY LUMBAR POSTERIOR MICROSCOPIC TWO LEVELS N/A 1/6/2025    Procedure: Lumbar 3-4, 4-5 laminectomy;  Surgeon: Clark Pryor MD;  Location: [facility] OR    PE TUBES      [facility] HAND/FINGER SURGERY UNLISTED      [facility] TOTAL KNEE ARTHROPLASTY       BP Readings from Last 3 Encounters:   04/16/25 (!) 131/98   03/27/25 130/87   01/08/25 114/68    Wt Readings from Last 3 Encounters:   04/16/25 (!) 147 kg (324 lb)   03/27/25 (!) 144.4 kg (318 lb 6.4 oz)   01/06/25 138.8 kg (306 lb)                  Patient Active Problem List   Diagnosis    ACL tear    Asthma, exercise induced    CARDIOVASCULAR SCREENING; LDL GOAL LESS THAN 160    Diagnostic skin and sensitization tests    Allergic rhinitis due to animal dander    Seasonal allergic rhinitis    Seasonal allergic conjunctivitis    Allergy to mold spores    Acute appendicitis    Essential hypertension with goal blood pressure less than 140/90    Urinary retention    Bilateral leg numbness    Chronic midline low back pain with bilateral sciatica    Urinary incontinence, unspecified type    Lumbar radiculopathy    Lower extremity weakness    S/P Syme amputation of foot (H)    PTSD (post-traumatic stress disorder)    Impaired functional mobility, balance, gait, and endurance    S/P lumbar laminectomy    Body mass index (BMI) 40.0-44.9, adult (H)     Past Surgical History:   Procedure Laterality Date    AMPUTATION  2015    APPENDECTOMY  2013    LAMINECTOMY LUMBAR POSTERIOR MICROSCOPIC TWO LEVELS N/A 1/6/2025    Procedure: Lumbar 3-4, 4-5 laminectomy;  Surgeon: Clark Pryor MD;  Location: [facility] OR    PE TUBES      [facility] HAND/FINGER SURGERY UNLISTED      [facility] TOTAL KNEE ARTHROPLASTY         Social History     Tobacco Use    Smoking status: Every  Day     Current packs/day: 0.25     Average packs/day: 0.3 packs/day for 20.3 years (5.1 ttl pk-yrs)     Types: Cigarettes     Start date: 1/1/2005     Passive exposure: Current    Smokeless tobacco: Never   Substance Use Topics    Alcohol use: No     Family History   Problem Relation Age of Onset    Psychotic Disorder Mother     Thyroid Disease Mother     Blood Disease Mother         hep b    Diabetes Mother     Hypertension Mother     Diabetes Maternal Grandmother     Cerebrovascular Disease Maternal Grandmother     Glaucoma Maternal Grandmother     Asthma Brother     Asthma Sister     Asthma Brother     Cancer Maternal Aunt     Macular Degeneration No family hx of          Current Outpatient Medications   Medication Sig Dispense Refill    acetaminophen (TYLENOL) 325 MG tablet Take 2 tablets (650 mg) by mouth every 4 hours as needed for mild pain.      cyclobenzaprine (FLEXERIL) 10 MG tablet Take 1 tablet (10 mg) by mouth 3 times daily as needed for muscle spasms. 60 tablet 1    diazepam (VALIUM) 5 MG tablet Take 1 tablet (5 mg) by mouth every 8 hours as needed for muscle spasms or pain. 15 tablet 0    Incontinence Supply Disposable (SLIMLINE DISPOSABLE BRIEFS XL) MISC Change every 4 hours or as needed 36 each 4    levalbuterol (XOPENEX HFA) 45 MCG/ACT inhaler Inhale 2 puffs into the lungs every 4 hours as needed for shortness of breath or wheezing. 15 g 2    losartan (COZAAR) 100 MG tablet Take 1 tablet (100 mg) by mouth daily. 60 tablet 0    losartan (COZAAR) 25 MG tablet Take 1 tablet (25 mg) by mouth daily. 90 tablet 3    traZODone (DESYREL) 50 MG tablet Take 1-2 tablets ( mg) by mouth at bedtime. 60 tablet 1     No Known Allergies  Recent Labs   Lab Test 01/06/25  1012 12/17/24  0901 12/02/24  1613 09/28/24  0621 09/27/24  1206 09/01/17  1535   A1C  --   --  6.4*  --   --   --    ALT  --   --   --   --  45  --    CR 0.77 0.90  --    < > 0.89 0.88   GFRESTIMATED >90 >90  --    < > >90 >90   GFRESTBLACK   "--   --   --   --   --  >90   POTASSIUM 4.1 4.0  --    < > 3.8 4.1    < > = values in this interval not displayed.               Review of Systems  Constitutional, HEENT, cardiovascular, pulmonary, GI, , musculoskeletal, neuro, skin, endocrine and psych systems are negative, except as otherwise noted.     Objective    Exam  BP (!) 131/98   Pulse 96   Temp 98.2  F (36.8  C) (Temporal)   Resp 20   Ht 1.791 m (5' 10.5\")   Wt (!) 147 kg (324 lb)   SpO2 98%   BMI 45.83 kg/m     Estimated body mass index is 45.83 kg/m  as calculated from the following:    Height as of this encounter: 1.791 m (5' 10.5\").    Weight as of this encounter: 147 kg (324 lb).    Physical Exam  GENERAL: alert and no distress  EYES: Eyes grossly normal to inspection, PERRL and conjunctivae and sclerae normal  HENT: ear canals and TM's normal, nose and mouth without ulcers or lesions  NECK: no adenopathy, no asymmetry, masses, or scars  RESP: lungs clear to auscultation - no rales, rhonchi or wheezes  CV: regular rate and rhythm, normal S1 S2, no S3 or S4, no murmur, click or rub, no peripheral edema  ABDOMEN: soft, nontender, no hepatosplenomegaly, no masses and bowel sounds normal  MS: no gross musculoskeletal defects noted, no edema  SKIN: no suspicious lesions or rashes  NEURO: Normal strength and tone, mentation intact and speech normal  PSYCH: mentation appears normal, affect normal/bright    Diabetic foot exam: normal DP and PT pulses, no trophic changes or ulcerative lesions, normal sensory exam, and normal monofilament exam   Prior to immunization administration, verified patients identity using patient s name and date of birth. Please see Immunization Activity for additional information.     Screening Questionnaire for Adult Immunization    Are you sick today?   No   Do you have allergies to medications, food, a vaccine component or latex?   No   Have you ever had a serious reaction after receiving a vaccination?   No   Do you " have a long-term health problem with heart, lung, kidney, or metabolic disease (e.g., diabetes), asthma, a blood disorder, no spleen, complement component deficiency, a cochlear implant, or a spinal fluid leak?  Are you on long-term aspirin therapy?   Yes   Do you have cancer, leukemia, HIV/AIDS, or any other immune system problem?   No   Do you have a parent, brother, or sister with an immune system problem?   No   In the past 3 months, have you taken medications that affect  your immune system, such as prednisone, other steroids, or anticancer drugs; drugs for the treatment of rheumatoid arthritis, Crohn s disease, or psoriasis; or have you had radiation treatments?   No   Have you had a seizure, or a brain or other nervous system problem?   No   During the past year, have you received a transfusion of blood or blood    products, or been given immune (gamma) globulin or antiviral drug?   No   For women: Are you pregnant or is there a chance you could become       pregnant during the next month?   No   Have you received any vaccinations in the past 4 weeks?   No     Immunization questionnaire was positive for at least one answer.  Notified Cedrick Osorio .      Patient instructed to remain in clinic for 15 minutes afterwards, and to report any adverse reactions.     Screening performed by Barb Kim MA on 4/16/2025 at 1:30 PM.         Signed Electronically by: Cedrick Osorio PA-C

## 2025-04-28 ENCOUNTER — VIRTUAL VISIT (OUTPATIENT)
Dept: EDUCATION SERVICES | Facility: CLINIC | Age: 30
End: 2025-04-28
Attending: PHYSICIAN ASSISTANT
Payer: COMMERCIAL

## 2025-04-28 DIAGNOSIS — E11.9 TYPE 2 DIABETES MELLITUS WITHOUT COMPLICATION, WITHOUT LONG-TERM CURRENT USE OF INSULIN (H): ICD-10-CM

## 2025-04-28 PROCEDURE — 99207 PR NO SHOW FOR SCHEDULED APPT: CPT | Mod: 95 | Performed by: DIETITIAN, REGISTERED

## 2025-04-28 NOTE — PROGRESS NOTES
Patient was a no show for today's appointment.     Patient was contacted to reschedule.     Action taken: follow-up orders placed, left message to call [phone] or use [employer] to reschedule, and sent [employer] message to call [phone] or use [employer] to reschedule.

## 2025-04-28 NOTE — LETTER
4/28/2025         RE: Mario Calderon  [address]  [place] [address]        Dear Colleague,    Thank you for referring your patient, Mario Calderon, to the [facility]. Please see a copy of my visit note below.    Patient was a no show for today's appointment.     Patient was contacted to reschedule.     Action taken: follow-up orders placed, left message to call [phone] or use [employer] to reschedule, and sent [employer] message to call [phone] or use [employer] to reschedule.

## 2025-04-30 ENCOUNTER — APPOINTMENT (OUTPATIENT)
Dept: OPTOMETRY | Facility: CLINIC | Age: 30
End: 2025-04-30
Payer: COMMERCIAL

## 2025-04-30 ENCOUNTER — OFFICE VISIT (OUTPATIENT)
Dept: OPTOMETRY | Facility: CLINIC | Age: 30
End: 2025-04-30
Attending: PHYSICIAN ASSISTANT
Payer: COMMERCIAL

## 2025-04-30 DIAGNOSIS — E11.9 TYPE 2 DIABETES MELLITUS WITHOUT COMPLICATION, WITHOUT LONG-TERM CURRENT USE OF INSULIN (H): ICD-10-CM

## 2025-04-30 DIAGNOSIS — H52.10 MYOPIA WITH REGULAR ASTIGMATISM: Primary | ICD-10-CM

## 2025-04-30 DIAGNOSIS — H52.229 MYOPIA WITH REGULAR ASTIGMATISM: Primary | ICD-10-CM

## 2025-04-30 PROCEDURE — 92004 COMPRE OPH EXAM NEW PT 1/>: CPT

## 2025-04-30 PROCEDURE — 92015 DETERMINE REFRACTIVE STATE: CPT

## 2025-04-30 ASSESSMENT — REFRACTION_MANIFEST
OS_CYLINDER: +1.00
OD_SPHERE: -2.00
OS_AXIS: 090
OD_AXIS: 101
OS_SPHERE: -1.50
OD_AXIS: 098
OD_CYLINDER: +1.50
OD_SPHERE: -2.25
METHOD_AUTOREFRACTION: 1
OS_SPHERE: -1.50
OD_CYLINDER: +1.75
OS_CYLINDER: +1.00
OS_AXIS: 082

## 2025-04-30 ASSESSMENT — KERATOMETRY
OS_K1POWER_DIOPTERS: 43.75
OD_K1POWER_DIOPTERS: 44.25
OD_K2POWER_DIOPTERS: 46.00
OS_AXISANGLE2_DEGREES: 171
OS_AXISANGLE_DEGREES: 81
OS_K2POWER_DIOPTERS: 45.75
OD_AXISANGLE2_DEGREES: 10
OD_AXISANGLE_DEGREES: 100

## 2025-04-30 ASSESSMENT — VISUAL ACUITY
OD_SC: 20/25
OS_SC+: -1
OS_SC: 20/25
METHOD: SNELLEN - LINEAR
OD_SC+: -2
OS_SC: 20/20
OD_SC: 20/30

## 2025-04-30 ASSESSMENT — CONF VISUAL FIELD
OS_SUPERIOR_TEMPORAL_RESTRICTION: 0
OD_NORMAL: 1
OD_SUPERIOR_NASAL_RESTRICTION: 0
OD_INFERIOR_TEMPORAL_RESTRICTION: 0
OS_SUPERIOR_NASAL_RESTRICTION: 0
OS_INFERIOR_NASAL_RESTRICTION: 0
OS_NORMAL: 1
OD_INFERIOR_NASAL_RESTRICTION: 0
OS_INFERIOR_TEMPORAL_RESTRICTION: 0
OD_SUPERIOR_TEMPORAL_RESTRICTION: 0

## 2025-04-30 ASSESSMENT — TONOMETRY
OD_IOP_MMHG: 14
IOP_METHOD: TONOPEN
OS_IOP_MMHG: 15

## 2025-04-30 ASSESSMENT — SLIT LAMP EXAM - LIDS
COMMENTS: NORMAL
COMMENTS: NORMAL

## 2025-04-30 ASSESSMENT — EXTERNAL EXAM - RIGHT EYE: OD_EXAM: NORMAL

## 2025-04-30 ASSESSMENT — CUP TO DISC RATIO
OD_RATIO: 0.2
OS_RATIO: 0.2

## 2025-04-30 ASSESSMENT — EXTERNAL EXAM - LEFT EYE: OS_EXAM: NORMAL

## 2025-04-30 NOTE — PROGRESS NOTES
Chief Complaint   Patient presents with    Diabetic Eye Exam        Chief Complaint(s) and History of Present Illness(es)       Diabetic Eye Exam                    Lab Results   Component Value Date    A1C 6.9 04/16/2025    A1C 6.4 12/02/2024    A1C 5.4 11/20/2015            Last Eye Exam: 2 years ago   Dilated Previously: Yes    What are you currently using to see?  does not use glasses or contacts    Distance Vision Acuity: Noticed gradual change in both eyes    Near Vision Acuity: Satisfied with vision while reading and using computer unaided    Eye Comfort: good except during allergy season   Do you use eye drops? 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       OBJECTIVE: See Ophthalmology exam    ASSESSMENT:    ICD-10-CM    1. Myopia with regular astigmatism  H52.10 REFRACTION    H52.229 EYE EXAM (SIMPLE-NONBILLABLE)      2. Type 2 diabetes mellitus without complication, without long-term current use of insulin (H)  E11.9 Adult Eye  Referral     EYE EXAM (SIMPLE-NONBILLABLE)          PLAN:    [facility]  [facility] exam results will be sent to Cedrick Osorio.  Patient Instructions   Myopia with regular astigmatism, both eyes: Updated glasses prescription for full-time wear.  Monitor annually.  T2DM: No retinopathy on today's exam.  Patient educated on condition. Stressed importance of close BS/BP monitoring, diet/exercise, medication compliance, and regular visits with PCP. Monitor annually.        Halle Hancock, OD

## 2025-04-30 NOTE — PATIENT INSTRUCTIONS
Myopia with regular astigmatism, both eyes: Updated glasses prescription for full-time wear.  Monitor annually.  T2DM: No retinopathy on today's exam.  Patient educated on condition. Stressed importance of close BS/BP monitoring, diet/exercise, medication compliance, and regular visits with PCP. Monitor annually.

## 2025-04-30 NOTE — LETTER
4/30/2025      Mario Calderon  [address]  [facility] [address]      Dear Colleague,    Thank you for referring your patient, Mario Calderon, to the [facility]. Please see a copy of my visit note below.    Chief Complaint   Patient presents with     Diabetic Eye Exam        Chief Complaint(s) and History of Present Illness(es)       Diabetic Eye Exam                    Lab Results   Component Value Date    A1C 6.9 04/16/2025    A1C 6.4 12/02/2024    A1C 5.4 11/20/2015            Last Eye Exam: 2 years ago   Dilated Previously: Yes    What are you currently using to see?  does not use glasses or contacts    Distance Vision Acuity: Noticed gradual change in both eyes    Near Vision Acuity: Satisfied with vision while reading and using computer unaided    Eye Comfort: good except during allergy season   Do you use eye drops? 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       OBJECTIVE: See Ophthalmology exam    ASSESSMENT:    ICD-10-CM    1. Myopia with regular astigmatism  H52.10 REFRACTION    H52.229 EYE EXAM (SIMPLE-NONBILLABLE)      2. Type 2 diabetes mellitus without complication, without long-term current use of insulin (H)  E11.9 Adult Eye  Referral     EYE EXAM (SIMPLE-NONBILLABLE)          PLAN:    Mario Iglesiaskileyaudra aware  eye exam results will be sent to Cedrick Osorio.  Patient Instructions   Myopia with regular astigmatism, both eyes: Updated glasses prescription for full-time wear.  Monitor annually.  T2DM: No retinopathy on today's exam.  Patient educated on condition. Stressed importance of close BS/BP monitoring, diet/exercise, medication compliance, and regular visits with PCP. Monitor annually.        Halle Hancock, OD      Again, thank you for allowing me to participate in the care of your patient.         Sincerely,        Halle Hancock OD    Electronically signed

## 2025-05-05 ENCOUNTER — THERAPY VISIT (OUTPATIENT)
Dept: PHYSICAL THERAPY | Facility: CLINIC | Age: 30
End: 2025-05-05
Attending: PHYSICIAN ASSISTANT
Payer: COMMERCIAL

## 2025-05-05 DIAGNOSIS — M54.41 CHRONIC MIDLINE LOW BACK PAIN WITH BILATERAL SCIATICA: ICD-10-CM

## 2025-05-05 DIAGNOSIS — M54.16 LUMBAR RADICULOPATHY: Primary | ICD-10-CM

## 2025-05-05 DIAGNOSIS — Z98.890 S/P LUMBAR LAMINECTOMY: ICD-10-CM

## 2025-05-05 DIAGNOSIS — M54.42 CHRONIC MIDLINE LOW BACK PAIN WITH BILATERAL SCIATICA: ICD-10-CM

## 2025-05-05 DIAGNOSIS — G89.29 CHRONIC MIDLINE LOW BACK PAIN WITH BILATERAL SCIATICA: ICD-10-CM

## 2025-05-05 PROCEDURE — 97110 THERAPEUTIC EXERCISES: CPT | Mod: GP

## 2025-05-05 PROCEDURE — 97163 PT EVAL HIGH COMPLEX 45 MIN: CPT | Mod: GP

## 2025-05-05 NOTE — PROGRESS NOTES
PHYSICAL THERAPY EVALUATION  Type of Visit: Evaluation       Fall Risk Screen:  Have you fallen 2 or more times in the past year?: No  Have you fallen and had an injury in the past year?: No    Subjective         Presenting condition or subjective complaint: My back  Date of onset: 01/06/25    Relevant medical history:     Dates & types of surgery:      Prior diagnostic imaging/testing results: MRI; CT scan; X-ray     Prior therapy history for the same diagnosis, illness or injury: Yes Unknown    Prior Level of Function  Transfers:   Ambulation:   ADL:   IADL:     Living Environment  Social support: With family members   Type of home: [facility]   Stairs to enter the home: Yes 10 Is there a railing: Yes     Ramp: No   Stairs inside the home: Yes 10 Is there a railing: Yes     Help at home: Home management tasks (cooking, cleaning)  Equipment owned: Standard wheelchair     Employment: Yes   Hobbies/Interests: Ridding motorcycle    Patient goals for therapy: Lift heavy objects    PHYSICAL THERAPY LUMBAR EVALUATION    SUBJECTIVE:  Mario is a 29 year old male who reports that since stopping Physical Therarpy, he met with Dr. Pryor and quickly had surgery.  He had to wait about 3 weeks and had the surgery in January.  After 1-2 months he was gradually able to use his legs more, stand up better and progressive got better and better. The numbness and tingliness is still present in his Lower Extremities.  He has returned to work but isn't lifting anything too heavy (100 lbs) because he feels the strain.  The light stuff is getting a lot easier, although he is getting stronger. Bowel / Bladder hasn't changed and he has a visit set up with Urology tomorrow.      Patient reports symptoms of:  Pain, Range of Motion Loss, Stiffness / Tightness, Numbness, Tingliness, Weakness, Loss of Sensation, Loss of Balance, Gait Impairments, and Clicking / Popping / Cavitations    Patient report of Pain:  Pain Rating Now:  5/10  Pain Rating at Best: 3/10  Pain Rating at Worst: 8/10  Pain Location: Left Low Back pain, but pretty much central low back. Worse in lower lumbar spine after work  Pain Quality/Description: Achy, Stiff, Sometimes a pinch. Some spasms. Some throbs  Pain Better with: Muscle Relaxors  Pain Worse with: Waking in the morning (Stiff), Lifting Heavy, Work end of the day. Prolonged walking. First time resting after work  Progression of Symptoms: Improving    Patient reports Red Flags symptoms of:  Incontinence  Bilateral Radicular Symptoms    OBJECTIVE:  Seated Lower Extremity Manual Muscle Test / Myotome Assessment:  Hip Flexion (L2): Right Leg 5/5, Left Leg 5/5  Knee Extension (L3): Right Leg 5/5, Left Leg 5/5  Ankle Dorsiflexion (L4): Right Leg N/A, Left Leg 5/5  Great Toe (L5): Right Leg N/A, Left Leg 5/5  Ankle Plantarflexion (S1): Right Leg N/A, Left Leg 2/5  Knee Flexion (S2): Right Leg 5/5, Left Leg 5/5  Sidelying Hip Abduction: Right Le/5, Left Le/5  Prone Hip Extension: Right Le/5, Left Leg 3/5  Single Leg Calf Raise: Unable on Left  Supine Double Leg Bridge: 3 reps but TOO painful on left hip    Seated Neural Tension Assessment:   Slump Test: Right Leg: Positive with knee extension, Left Leg: Positive with knee extension.  STILL POSITIVE BILATERALLY LEFT WORSE THAN RIGHT    Standing Lumbar Active ROM:  Standing Lumbar Flexion (Hands slide down thighs): Fingertips to Ankles  Standing Lumbar Extension: Severe Restriction of 75% or more of ROM  Right Lumbar Side Bend: No Restriction and WNL, Left Lumbar Side Bend: No Restriction and WNL, Right Lumbar Rotation: No Restriction and WNL, and Left Lumbar Rotation: Mild Restriction of 25% of ROM     Hip Mobility  Hamstring: Tight bilaterally, left tighter than right      ASSESSMENT:  Mario is a 29 year old male referred to Physical Therapy for S/p Lumbar Laminectomy   from Cedrick Osorio.  Mario demonstrates findings of Pain, Weakness, Motion Loss,  Radicular Symptoms, Neurological Symptoms, Loss of Function, Balance Deficits and Fall Risk, and Gait Deficits that justify a need for formal Physical Therapy. These impairments interfere with their ability to perform self care tasks, work tasks, recreational activities, household chores, driving , household mobility, and community mobility as compared to their previous level of function.    Medical Diagnosis: S/p Lumbar Laminectomy    Treatment Diagnosis: S/p Lumbar Laminectomy     Clinical Decision Making (Complexity):  Clinical Presentation: Unstable/Unpredictable   Clinical Presentation Rationale: based on medical and personal factors listed in PT evaluation  Clinical Decision Making (Complexity): High complexity      PHYSICAL THERAPY PLAN OF CARE:  Treatment Interventions:  Modalities: Cryotherapy, E-stim, Hot Pack, Mechanical Traction, Ultrasound  Interventions: Gait Training, Manual Therapy, Neuromuscular Re-education, Therapeutic Activity, Therapeutic Exercise    Long Term Goals     PT Goal 1  Goal Identifier: Lifting  Goal Description: Mario will be able to lift 100 lbs off the ground without low back pain  Rationale: to maximize safety and independence with performance of ADLs and functional tasks;to maximize safety and independence within the community;to maximize safety and independence with transportation;to maximize safety and independence within the home;to maximize safety and independence with self cares  Goal Progress: Lifting light things at work, but has muscle weakness of 4/5 to 2/5 in the left LE  Target Date: 07/28/25  PT Goal 2  Goal Identifier: Ambulation  Goal Description: Will be able to ambulate 1.5 miles or 30 minutes consecuitively without low back pain  Rationale: to maximize safety and independence with performance of ADLs and functional tasks;to maximize safety and independence within the home;to maximize safety and independence within the community;to maximize safety and independence  with transportation;to maximize safety and independence with self cares  Goal Progress: Gets back pain with prolonged ambulation  Target Date: 06/30/25  PT Goal 3  Goal Identifier: Manual Muscle testing  Goal Description: Will improve ankle plantarflexor strength to be able to perform 1 single leg calf raise and will imprve hip extension glute strength to be able to perform a bridge for efficient walking and transfers  Rationale: to maximize safety and independence with performance of ADLs and functional tasks;to maximize safety and independence within the home;to maximize safety and independence within the community;to maximize safety and independence with transportation;to maximize safety and independence with self cares  Goal Progress: 2/5 ankle plantarflexion and limited to 3 bridges due to pain  Target Date: 06/02/25    Frequency of Treatment: 1x a week  Duration of Treatment: 12 weeks         Risks and benefits of evaluation/treatment have been explained.   Patient/Family/caregiver agrees with Plan of Care.      Evaluation Time:     PT Eval, High Complexity Minutes (24668): 30         Signing Clinician: Mario Garcia, PT        SUMMARY OF PLAN OF CARE:  Mario returns to therapy after undergoing a lumbar laminectomy with Dr. Pryor. His symptoms have improved greatly following Dr. Pryor's surgery and he has returned to work. He has been getting himself stronger with day to day tasks, but does have weakness in muscle testing that warrents physical therapy.  Therefor we will start with open chain based strengthening and progress based on his response.          [facility]                                                                                   OUTPATIENT PHYSICAL THERAPY      PLAN OF TREATMENT FOR OUTPATIENT REHABILITATION   Patient's Last Name, First Name, STEPHANIE CalderonMario MORAN YOB: 1995   Provider's Name   [facility]   Medical Record No.  [id number]     Onset Date: 01/06/25  Start of Care Date: 05/05/25     Medical Diagnosis:  S/p Lumbar Laminectomy      PT Treatment Diagnosis:  S/p Lumbar Laminectomy Plan of Treatment  Frequency/Duration: 1x a week/ 12 weeks    Certification date from 05/05/25 to 07/28/25         See note for plan of treatment details and functional goals     Mario Garcia, PT                         I CERTIFY THE NEED FOR THESE SERVICES FURNISHED UNDER        THIS PLAN OF TREATMENT AND WHILE UNDER MY CARE     (Physician attestation of this document indicates review and certification of the therapy plan).              Referring Provider:  Cedrick Osorio    Initial Assessment  See Epic Evaluation- Start of Care Date: 05/05/25

## 2025-05-06 ENCOUNTER — APPOINTMENT (OUTPATIENT)
Dept: OPTOMETRY | Facility: CLINIC | Age: 30
End: 2025-05-06
Payer: COMMERCIAL

## 2025-05-06 ENCOUNTER — OFFICE VISIT (OUTPATIENT)
Dept: UROLOGY | Facility: CLINIC | Age: 30
End: 2025-05-06
Attending: UROLOGY
Payer: COMMERCIAL

## 2025-05-06 DIAGNOSIS — K59.2 NEUROGENIC BOWEL: ICD-10-CM

## 2025-05-06 DIAGNOSIS — R32 URINARY INCONTINENCE, UNSPECIFIED TYPE: ICD-10-CM

## 2025-05-06 DIAGNOSIS — N31.9 NEUROGENIC BLADDER: Primary | ICD-10-CM

## 2025-05-06 DIAGNOSIS — K59.00 CONSTIPATION, UNSPECIFIED CONSTIPATION TYPE: ICD-10-CM

## 2025-05-06 PROCEDURE — 92340 FIT SPECTACLES MONOFOCAL: CPT

## 2025-05-06 ASSESSMENT — PAIN SCALES - GENERAL: PAINLEVEL_OUTOF10: NO PAIN (0)

## 2025-05-06 NOTE — NURSING NOTE
Mario Calderon's chief complaint for this visit includes:  Chief Complaint   Patient presents with    New Patient     Urinary incontinence  PVR 3      PCP: Cedrick Osorio    Referring Provider:  Clark Pryor MD  [address] [place] [id number]    There were no vitals taken for this visit.  No Pain (0)      No Known Allergies      Do you need any medication refills at today's visit? No    Ольга Becerra CMA

## 2025-05-06 NOTE — PROGRESS NOTES
Urology Consult History and Physical  ABIGAIL LEAL   Name: Mario Calderon    MRN: [id number]   YOB: 1995       We were asked to see Mario Calderon at the request of Dr. Pryor for evaluation and treatment of neurogenic bladder, urinary incontinence .        Chief Complaint:   Neurogenic bladder, urinary incontinence     History is obtained from the patient            History of Present Illness:   Mario Calderon is a 29 year old male who is being seen for evaluation of Neurogenic bladder and urinary incontinence     Developed LE numbness and weakness last Sept with ER presentation and found to have herniated discs   He also developed decreased urine flow and stream and difficulty controlling   History of lumbar laminectomy 1/6/2025  His ambulation has improved, but he continues to have urinary issues    He notes difficultly starting and maintaining a stream  He is needing to use pads due to stress and urge incontinence  He will wake up damp at night    He also continues to struggle with constipation  He will really have to strain    AUASS: 3-1-4-4-4-5-3 = 24  QOL = 6  PVR = 15cc           Past Medical History:     Past Medical History:   Diagnosis Date    ADD (attention deficit disorder)     Allergic rhinitis due to animal dander     Allergy to mold spores     8/28/12 skin tests pos. for:  dog(+)/DM/M/T/G/W    Asthma, exercise induced     excersise induced    Depressive disorder 2015    Diagnostic skin and sensitization tests 8/28/12 skin tests pos. for:  dog(+)/DM/M/T/G/W    Essential hypertension with goal blood pressure less than 140/90 09/01/2017    House dust mite allergy     Obese     Seasonal allergic conjunctivitis     Seasonal allergic rhinitis             Past Surgical History:     Past Surgical History:   Procedure Laterality Date    AMPUTATION  2015    APPENDECTOMY  2013    LAMINECTOMY LUMBAR POSTERIOR MICROSCOPIC TWO LEVELS N/A 1/6/2025    Procedure: Lumbar 3-4, 4-5  laminectomy;  Surgeon: Clark Pryor MD;  Location: [facility] OR    PE TUBES      [facility] HAND/FINGER SURGERY UNLISTED      [facility] TOTAL KNEE ARTHROPLASTY              Social History:     Social History     Tobacco Use    Smoking status: Every Day     Current packs/day: 0.25     Average packs/day: 0.3 packs/day for 20.3 years (5.1 ttl pk-yrs)     Types: Cigarettes     Start date: 1/1/2005     Passive exposure: Current    Smokeless tobacco: Former   Substance Use Topics    Alcohol use: No       History   Smoking Status    Every Day    Types: Cigarettes   Smokeless Tobacco    Former            Family History:     Family History   Problem Relation Age of Onset    Psychotic Disorder Mother     Thyroid Disease Mother     Blood Disease Mother         hep b    Diabetes Mother     Hypertension Mother     Diabetes Maternal Grandmother     Cerebrovascular Disease Maternal Grandmother     Glaucoma Maternal Grandmother     Asthma Brother     Asthma Sister     Asthma Brother     Cancer Maternal Aunt     Macular Degeneration No family hx of               Allergies:   No Known Allergies         Medications:     Current Outpatient Medications   Medication Sig Dispense Refill    acetaminophen (TYLENOL) 325 MG tablet Take 2 tablets (650 mg) by mouth every 4 hours as needed for mild pain.      cyclobenzaprine (FLEXERIL) 10 MG tablet Take 1 tablet (10 mg) by mouth 3 times daily as needed for muscle spasms. 60 tablet 1    levalbuterol (XOPENEX HFA) 45 MCG/ACT inhaler Inhale 2 puffs into the lungs every 4 hours as needed for shortness of breath or wheezing. 15 g 2    losartan (COZAAR) 100 MG tablet Take 1 tablet (100 mg) by mouth daily. 60 tablet 0    losartan (COZAAR) 25 MG tablet Take 1 tablet (25 mg) by mouth daily. 90 tablet 3    traZODone (DESYREL) 50 MG tablet Take 1-2 tablets ( mg) by mouth at bedtime. 60 tablet 1    diazepam (VALIUM) 5 MG tablet Take 1 tablet (5 mg) by mouth every 8 hours as needed for muscle spasms or pain. (Patient  not taking: Reported on 5/6/2025) 15 tablet 0    Incontinence Supply Disposable (SLIMLINE DISPOSABLE BRIEFS XL) MISC Change every 4 hours or as needed 36 each 4     No current facility-administered medications for this visit.             Review of Systems:     Noted above          Physical Exam:   No data found.  There is no height or weight on file to calculate BMI.     General: age-appropriate appearing male in NAD  HEENT: Head AT/NC, EOMI, CN Grossly intact  Lungs: no respiratory distress, or pursed lip breathing  Heart: No obvious jugular venous distension present  Abdomen: obesely-distended  LE: right LE BKA   Neuro: Alert, oriented, speech and mentation normal;  moving all 4 extremities equally.  Psych: affect and mood normal          Data:   All laboratory data reviewed:    UA RESULTS:  Recent Labs   Lab Test 09/27/24  1204   COLOR Light Yellow   APPEARANCE Clear   URINEGLC Negative   URINEBILI Negative   URINEKETONE Negative   SG 1.017   UBLD Negative   URINEPH 5.0   PROTEIN Negative   NITRITE Negative   LEUKEST Negative   RBCU 1   WBCU 4     Lab Results   Component Value Date    CR 0.77 01/06/2025    CR 0.88 09/01/2017             Impression and Plan:   Impression:   29-year-old male with history of herniated lumbar disks now status post lumbar laminectomy with ongoing neurogenic bowel and bladder symptoms      Plan:   Neurogenic bladder  - We discussed that his symptoms are most consistent with a neurogenic bladder  - We discussed that given his normal voiding prior to his back injury, and his young age, there is nothing to suggest an underlying bladder outlet issue  - He is emptying well with Crede voiding  - Given that he feels his symptoms have plateaued, we will plan for obtaining urodynamics to assess his detrusor functionality  - We discussed the possibility of instruction on intermittent self-catheterization, however he is hoping to avoid that at this time  - May follow-up with me after his  urodynamics    Neurogenic bowel and constipation  - Referral placed for GI     Thank you for the kind consultation.    Time spent: 20 minutes spent on the date of the encounter doing chart review, history and exam, documentation and further activities as noted above.    Paolo Archuleta MD   Urology  [facility]  [facility] Phone: [phone]  [facility] Phone: [phone]

## 2025-05-28 ENCOUNTER — OFFICE VISIT (OUTPATIENT)
Dept: FAMILY MEDICINE | Facility: CLINIC | Age: 30
End: 2025-05-28
Payer: COMMERCIAL

## 2025-05-28 VITALS
BODY MASS INDEX: 44.1 KG/M2 | DIASTOLIC BLOOD PRESSURE: 94 MMHG | HEIGHT: 71 IN | HEART RATE: 103 BPM | SYSTOLIC BLOOD PRESSURE: 128 MMHG | OXYGEN SATURATION: 95 % | WEIGHT: 315 LBS | TEMPERATURE: 97.4 F | RESPIRATION RATE: 20 BRPM

## 2025-05-28 DIAGNOSIS — I10 BENIGN ESSENTIAL HYPERTENSION: Primary | ICD-10-CM

## 2025-05-28 DIAGNOSIS — F51.01 PRIMARY INSOMNIA: ICD-10-CM

## 2025-05-28 DIAGNOSIS — R06.83 SNORING: ICD-10-CM

## 2025-05-28 PROCEDURE — 99213 OFFICE O/P EST LOW 20 MIN: CPT | Performed by: PHYSICIAN ASSISTANT

## 2025-05-28 PROCEDURE — 3074F SYST BP LT 130 MM HG: CPT | Performed by: PHYSICIAN ASSISTANT

## 2025-05-28 PROCEDURE — 3080F DIAST BP >= 90 MM HG: CPT | Performed by: PHYSICIAN ASSISTANT

## 2025-05-28 PROCEDURE — 1125F AMNT PAIN NOTED PAIN PRSNT: CPT | Performed by: PHYSICIAN ASSISTANT

## 2025-05-28 RX ORDER — DOXEPIN 6 MG/1
6 TABLET, FILM COATED ORAL AT BEDTIME
Qty: 30 TABLET | Refills: 1 | Status: SHIPPED | OUTPATIENT
Start: 2025-05-28

## 2025-05-28 RX ORDER — AMLODIPINE BESYLATE 2.5 MG/1
2.5 TABLET ORAL DAILY
Qty: 60 TABLET | Refills: 0 | Status: SHIPPED | OUTPATIENT
Start: 2025-05-28

## 2025-05-28 RX ORDER — LOSARTAN POTASSIUM 100 MG/1
100 TABLET ORAL DAILY
Qty: 90 TABLET | Refills: 1 | Status: SHIPPED | OUTPATIENT
Start: 2025-05-28

## 2025-05-28 ASSESSMENT — PAIN SCALES - GENERAL: PAINLEVEL_OUTOF10: SEVERE PAIN (9)

## 2025-05-28 ASSESSMENT — ENCOUNTER SYMPTOMS: BACK PAIN: 1

## 2025-05-28 NOTE — PROGRESS NOTES
"    Whit Martin is a 29 year old, presenting for the following health issues:  Hypertension and Back Pain        5/28/2025    12:51 PM   Additional Questions   Roomed by Barb Kim CMA   Accompanied by None     Back Pain     History of Present Illness       Back Pain:  He presents for follow up of back pain. Patient's back pain is a chronic problem.  Location of back pain:  Left buttock, left hip and left side of waist  Description of back pain: burning, cramping, dull ache, sharp, shooting and stabbing  Back pain spreads: left buttocks, left thigh, left knee and left foot    Since patient first noticed back pain, pain is: gradually worsening  Does back pain interfere with his job:  Yes       Hypertension: He presents for follow up of hypertension.  He does check blood pressure  regularly outside of the clinic. Outside blood pressures have been over 140/90. He does not follow a low salt diet.     He eats 2-3 servings of fruits and vegetables daily.He consumes 1 sweetened beverage(s) daily.He exercises with enough effort to increase his heart rate 60 or more minutes per day.  He exercises with enough effort to increase his heart rate 5 days per week.   He is taking medications regularly.      Blood pressure has shown some improvement. Still elevated   Issues with insomnia. He does snore. Daytime fatigue due to sleep deprivation.    Review of Systems  Constitutional, HEENT, cardiovascular, pulmonary, GI, , musculoskeletal, neuro, skin, endocrine and psych systems are negative, except as otherwise noted.      Objective    BP (!) 128/94   Pulse 103   Temp 97.4  F (36.3  C) (Temporal)   Resp 20   Ht 1.791 m (5' 10.5\")   Wt (!) 146.1 kg (322 lb)   SpO2 95%   BMI 45.55 kg/m    Body mass index is 45.55 kg/m .  Physical Exam   Eye exam - right eye normal lid, conjunctiva, cornea, pupil and fundus, left eye normal lid, conjunctiva, cornea, pupil and fundus.  Thyroid not palpable, not enlarged, no nodules " detected.  CHEST:chest clear to IPPA, no tachypnea, retractions or cyanosis, and S1, S2 normal, no murmur, no gallop, rate regular.      Mario was seen today for hypertension and back pain.    Diagnoses and all orders for this visit:    Benign essential hypertension  -     losartan (COZAAR) 100 MG tablet; Take 1 tablet (100 mg) by mouth daily.  -     amLODIPine (NORVASC) 2.5 MG tablet; Take 1 tablet (2.5 mg) by mouth daily.    Primary insomnia  -     doxepin (SILENOR) 6 MG tablet; Take 1 tablet (6 mg) by mouth at bedtime.  -     Adult Sleep Eval & Management  Referral; Future    Snoring  -     Adult Sleep Eval & Management  Referral; Future    Other orders  -     REVIEW OF HEALTH MAINTENANCE PROTOCOL ORDERS      Lower sodium diet.  Discussed sleep hygiene.   Recheck blood pressure in 6-8 wks.        Signed Electronically by: Cedrick Osorio PA-C

## 2025-06-02 ENCOUNTER — TELEPHONE (OUTPATIENT)
Dept: PHARMACY | Facility: OTHER | Age: 30
End: 2025-06-02
Payer: COMMERCIAL

## 2025-06-02 NOTE — TELEPHONE ENCOUNTER
MTM Recruitment: [facility] insurance     Referral outreach attempt #1 on June 2, 2025      Outcome: call attempted, could not leave voicemail  - google assistant  Erica sent     Annette Kelly CMA  MTM

## 2025-06-05 ENCOUNTER — TELEPHONE (OUTPATIENT)
Dept: PHARMACY | Facility: OTHER | Age: 30
End: 2025-06-05

## 2025-06-05 NOTE — TELEPHONE ENCOUNTER
MT Recruitment: [facility]     Referral outreach attempt #2 on June 5, 2025      Outcome: left voicemail- Call back number [phone]    Annette Kelly CMA  [facility]

## 2025-06-09 ENCOUNTER — MYC MEDICAL ADVICE (OUTPATIENT)
Dept: FAMILY MEDICINE | Facility: CLINIC | Age: 30
End: 2025-06-09
Payer: COMMERCIAL

## 2025-06-09 DIAGNOSIS — M54.16 LUMBAR RADICULOPATHY: ICD-10-CM

## 2025-06-10 RX ORDER — IBUPROFEN 800 MG/1
800 TABLET, FILM COATED ORAL EVERY 8 HOURS PRN
Qty: 90 TABLET | Refills: 0 | Status: SHIPPED | OUTPATIENT
Start: 2025-06-10

## 2025-06-11 ENCOUNTER — PATIENT OUTREACH (OUTPATIENT)
Dept: CARE COORDINATION | Facility: CLINIC | Age: 30
End: 2025-06-11
Payer: COMMERCIAL

## 2025-06-23 DIAGNOSIS — M54.16 LUMBAR RADICULOPATHY: ICD-10-CM

## 2025-06-23 RX ORDER — IBUPROFEN 800 MG/1
800 TABLET, FILM COATED ORAL EVERY 8 HOURS PRN
Qty: 90 TABLET | Refills: 0 | OUTPATIENT
Start: 2025-06-23

## 2025-06-23 NOTE — TELEPHONE ENCOUNTER
Mario is due for an appt to recheck his blood pressure. Help him schedule this with me. We'll discuss pain management options at his visit with me.

## 2025-06-30 ENCOUNTER — OFFICE VISIT (OUTPATIENT)
Dept: FAMILY MEDICINE | Facility: CLINIC | Age: 30
End: 2025-06-30
Payer: COMMERCIAL

## 2025-06-30 VITALS
TEMPERATURE: 98.2 F | HEART RATE: 105 BPM | SYSTOLIC BLOOD PRESSURE: 140 MMHG | DIASTOLIC BLOOD PRESSURE: 106 MMHG | RESPIRATION RATE: 24 BRPM | BODY MASS INDEX: 45.1 KG/M2 | HEIGHT: 70 IN | WEIGHT: 315 LBS | OXYGEN SATURATION: 98 %

## 2025-06-30 DIAGNOSIS — M54.16 LUMBAR RADICULOPATHY: ICD-10-CM

## 2025-06-30 DIAGNOSIS — I10 BENIGN ESSENTIAL HYPERTENSION: Primary | ICD-10-CM

## 2025-06-30 DIAGNOSIS — E11.9 TYPE 2 DIABETES MELLITUS WITHOUT COMPLICATION, WITHOUT LONG-TERM CURRENT USE OF INSULIN (H): ICD-10-CM

## 2025-06-30 PROCEDURE — 1125F AMNT PAIN NOTED PAIN PRSNT: CPT | Performed by: PHYSICIAN ASSISTANT

## 2025-06-30 PROCEDURE — 3077F SYST BP >= 140 MM HG: CPT | Performed by: PHYSICIAN ASSISTANT

## 2025-06-30 PROCEDURE — 3080F DIAST BP >= 90 MM HG: CPT | Performed by: PHYSICIAN ASSISTANT

## 2025-06-30 PROCEDURE — 99214 OFFICE O/P EST MOD 30 MIN: CPT | Performed by: PHYSICIAN ASSISTANT

## 2025-06-30 RX ORDER — AMLODIPINE AND VALSARTAN 5; 160 MG/1; MG/1
1 TABLET ORAL DAILY
Qty: 60 TABLET | Refills: 0 | Status: SHIPPED | OUTPATIENT
Start: 2025-06-30

## 2025-06-30 ASSESSMENT — PAIN SCALES - GENERAL: PAINLEVEL_OUTOF10: SEVERE PAIN (8)

## 2025-06-30 NOTE — PROGRESS NOTES
"    Whit Martin is a 29 year old, presenting for the following health issues:  Hypertension, Pain (Low back pain, radiating down left leg), and Health Maintenance (Patient is not fasting)        6/30/2025     1:44 PM   Additional Questions   Roomed by Domonique Pizarro CMA   Accompanied by None         6/30/2025     1:44 PM   Patient Reported Additional Medications   Patient reports taking the following new medications none     Musculoskeletal Problem    History of Present Illness       Back Pain:  He presents for follow up of back pain. Patient's back pain is a chronic problem.  Location of back pain:  Left lower back, left buttock, left hip and left side of waist  Description of back pain: burning, cramping, dull ache, sharp, shooting and stabbing  Back pain spreads: left buttocks, left thigh, left knee and left foot    Since patient first noticed back pain, pain is: rapidly worsening  Does back pain interfere with his job:  Yes   He consumes 2 sweetened beverage(s) daily.He exercises with enough effort to increase his heart rate 60 or more minutes per day.  He exercises with enough effort to increase his heart rate 5 days per week. He is missing 1 dose(s) of medications per week.      Miscommunication... patient stopped losartan once amlodipine was started.   History of diabetes. Interested in trying a glp 1     No chest pain/sob/palpitations/dizziness/ha's  Ongoing low back pain. Followed by neurosurgery. Surgeon is wanting him to lose weight prior to his surgery.      Review of Systems  Constitutional, HEENT, cardiovascular, pulmonary, GI, , musculoskeletal, neuro, skin, endocrine and psych systems are negative, except as otherwise noted.      Objective    BP (!) 140/106   Pulse 105   Temp 98.2  F (36.8  C) (Oral)   Resp 24   Ht 1.784 m (5' 10.25\")   Wt (!) 145.9 kg (321 lb 9.6 oz)   SpO2 98%   BMI 45.82 kg/m    Body mass index is 45.82 kg/m .  Physical Exam   Eye exam - right eye normal lid, " conjunctiva, cornea, pupil and fundus, left eye normal lid, conjunctiva, cornea, pupil and fundus.  Thyroid not palpable, not enlarged, no nodules detected.  CHEST:chest clear to IPPA, no tachypnea, retractions or cyanosis, and S1, S2 normal, no murmur, no gallop, rate regular.    Mario was seen today for hypertension, pain and health maintenance.    Diagnoses and all orders for this visit:    Benign essential hypertension  -     amLODIPine-valsartan (EXFORGE) 5-160 MG tablet; Take 1 tablet by mouth daily.    Lumbar radiculopathy    Type 2 diabetes mellitus without complication, without long-term current use of insulin (H)  -     semaglutide (OZEMPIC) 2 MG/3ML pen; Inject 0.25 mg subcutaneously every 7 days.      Discontinue amlodipine. Start exforge.  Lower sodium diet.  Start ozempic.  Lower faiza diet, higher protein. More exercise.   Recheck blood pressure in 4-5 wks.   Signed Electronically by: Cedrick Osorio PA-C

## 2025-07-07 ENCOUNTER — TELEPHONE (OUTPATIENT)
Dept: PHARMACY | Facility: OTHER | Age: 30
End: 2025-07-07
Payer: COMMERCIAL

## 2025-07-07 NOTE — TELEPHONE ENCOUNTER
MTM Recruitment: [facility] insurance     Referral outreach attempt #3 on July 7, 2025      Outcome: patient opted out    Annette Kelly CMA  MTM

## 2025-08-03 ENCOUNTER — HEALTH MAINTENANCE LETTER (OUTPATIENT)
Age: 30
End: 2025-08-03

## (undated) DEVICE — SU VICRYL 3-0 SH 8X18" UND J864D

## (undated) DEVICE — SPONGE SURGIFOAM 12 1972

## (undated) DEVICE — GLOVE BIOGEL PI MICRO INDICATOR UNDERGLOVE SZ 7.5 48975

## (undated) DEVICE — SU ETHILON 3-0 FS-1 18" 669H

## (undated) DEVICE — DRSG TELFA ISLAND 4X14" 7544

## (undated) DEVICE — SYR 30ML LL W/O NDL

## (undated) DEVICE — SPONGE COTTONOID 1/2X1" 80-1402

## (undated) DEVICE — GLOVE BIOGEL PI MICRO SZ 7.0 48570

## (undated) DEVICE — CATH TRAY FOLEY SURESTEP 16FR DRAIN BAG STATOCK A899916

## (undated) DEVICE — SPONGE KITTNER 30-101

## (undated) DEVICE — PREP CHLORAPREP 26ML TINTED HI-LITE ORANGE 930815

## (undated) DEVICE — CUSHION INSERT LG PRONE VIEW JACKSON TABLE

## (undated) DEVICE — GLOVE BIOGEL PI MICRO INDICATOR UNDERGLOVE SZ 8.0 48980

## (undated) DEVICE — SU VICRYL 2-0 CT-2 CR 8X18" J726D

## (undated) DEVICE — RX SURGIFLO HEMOSTATIC MATRIX 8ML 2991

## (undated) DEVICE — PEN MARKING SKIN W/LABELS 31145884

## (undated) DEVICE — Device

## (undated) DEVICE — DECANTER BAG 2002S

## (undated) DEVICE — GLOVE BIOGEL PI MICRO SZ 6.5 48565

## (undated) DEVICE — DRAPE MICROSCOPE LEICA 54X120" 09-MK653

## (undated) DEVICE — TOOL DISSECT MIDAS MR8 14CM BALL 4MM DIAMOND MR8-14BA40D

## (undated) DEVICE — GLOVE BIOGEL PI MICRO INDICATOR UNDERGLOVE SZ 6.5 48965

## (undated) DEVICE — LINEN POUCH DBL 5427

## (undated) DEVICE — PACK SMALL SPINE RIDGES

## (undated) DEVICE — DECANTER VIAL 2006S

## (undated) DEVICE — SUTURE VICRYL+ 0 CT-2 18" VCP727H

## (undated) DEVICE — ESU ELEC BLADE 2.75" COATED/INSULATED E1455

## (undated) DEVICE — GOWN LG DISP 9515

## (undated) DEVICE — LINEN ORTHO ACL PACK 5447

## (undated) DEVICE — TUBING SUCTION 12"X1/4" N612

## (undated) DEVICE — DRAPE MAYO STAND 23X54 8337

## (undated) DEVICE — SU MONOCRYL 4-0 PS-2 27" UND Y426H

## (undated) DEVICE — DRAIN HEMOVAC RESERVOIR KIT 10FR 1/8" MED 00-2550-002-10

## (undated) DEVICE — GLOVE BIOGEL PI MICRO SZ 8.0 48580

## (undated) DEVICE — DRAPE X-RAY TUBE 00-901169-01-OEC

## (undated) DEVICE — ESU GROUND PAD ADULT W/CORD E7507

## (undated) RX ORDER — PROPOFOL 10 MG/ML
INJECTION, EMULSION INTRAVENOUS
Status: DISPENSED
Start: 2025-01-06

## (undated) RX ORDER — DEXAMETHASONE SODIUM PHOSPHATE 4 MG/ML
INJECTION, SOLUTION INTRA-ARTICULAR; INTRALESIONAL; INTRAMUSCULAR; INTRAVENOUS; SOFT TISSUE
Status: DISPENSED
Start: 2025-01-06

## (undated) RX ORDER — FENTANYL CITRATE 50 UG/ML
INJECTION, SOLUTION INTRAMUSCULAR; INTRAVENOUS
Status: DISPENSED
Start: 2025-01-06

## (undated) RX ORDER — HYDROMORPHONE HCL IN WATER/PF 6 MG/30 ML
PATIENT CONTROLLED ANALGESIA SYRINGE INTRAVENOUS
Status: DISPENSED
Start: 2025-01-06

## (undated) RX ORDER — CEFAZOLIN SODIUM/WATER 3 G/30 ML
SYRINGE (ML) INTRAVENOUS
Status: DISPENSED
Start: 2025-01-06

## (undated) RX ORDER — LIDOCAINE HYDROCHLORIDE 10 MG/ML
INJECTION, SOLUTION EPIDURAL; INFILTRATION; INTRACAUDAL; PERINEURAL
Status: DISPENSED
Start: 2025-01-06

## (undated) RX ORDER — ACETAMINOPHEN 325 MG/1
TABLET ORAL
Status: DISPENSED
Start: 2025-01-06

## (undated) RX ORDER — BUPIVACAINE HYDROCHLORIDE AND EPINEPHRINE 2.5; 5 MG/ML; UG/ML
INJECTION, SOLUTION EPIDURAL; INFILTRATION; INTRACAUDAL; PERINEURAL
Status: DISPENSED
Start: 2025-01-06

## (undated) RX ORDER — METHOCARBAMOL 750 MG/1
TABLET, FILM COATED ORAL
Status: DISPENSED
Start: 2025-01-06

## (undated) RX ORDER — ONDANSETRON 2 MG/ML
INJECTION INTRAMUSCULAR; INTRAVENOUS
Status: DISPENSED
Start: 2025-01-06